# Patient Record
Sex: MALE | URBAN - METROPOLITAN AREA
[De-identification: names, ages, dates, MRNs, and addresses within clinical notes are randomized per-mention and may not be internally consistent; named-entity substitution may affect disease eponyms.]

---

## 2018-08-15 ENCOUNTER — IMPORTED ENCOUNTER (OUTPATIENT)
Dept: URBAN - METROPOLITAN AREA CLINIC 1 | Facility: CLINIC | Age: 73
End: 2018-08-15

## 2023-07-09 ENCOUNTER — HOSPITAL ENCOUNTER (OUTPATIENT)
Age: 78
Setting detail: OBSERVATION
Discharge: HOME | End: 2023-07-10
Attending: FAMILY MEDICINE | Admitting: INTERNAL MEDICINE
Payer: OTHER GOVERNMENT

## 2023-07-09 ENCOUNTER — APPOINTMENT (OUTPATIENT)
Age: 78
End: 2023-07-09
Payer: OTHER GOVERNMENT

## 2023-07-09 DIAGNOSIS — R55 SYNCOPE AND COLLAPSE: Primary | ICD-10-CM

## 2023-07-09 PROBLEM — N17.9 ACUTE KIDNEY INJURY (HCC): Status: ACTIVE | Noted: 2023-07-09

## 2023-07-09 LAB
ALBUMIN SERPL-MCNC: 3.5 G/DL (ref 3.4–5)
ALBUMIN/GLOB SERPL: 0.7 (ref 0.8–1.7)
ALP SERPL-CCNC: 89 U/L (ref 45–117)
ALT SERPL-CCNC: 21 U/L (ref 16–61)
ANION GAP SERPL CALC-SCNC: 10 MMOL/L (ref 3–18)
APPEARANCE UR: CLEAR
AST SERPL W P-5'-P-CCNC: 33 U/L (ref 10–38)
BASOPHILS # BLD: 0 K/UL (ref 0–0.1)
BASOPHILS NFR BLD: 0 % (ref 0–2)
BILIRUB SERPL-MCNC: 0.3 MG/DL (ref 0.2–1)
BILIRUB UR QL: NEGATIVE
BNP SERPL-MCNC: 119 PG/ML (ref 0–1800)
BUN SERPL-MCNC: 17 MG/DL (ref 7–18)
BUN/CREAT SERPL: 11 (ref 12–20)
CA-I BLD-MCNC: 8.9 MG/DL (ref 8.5–10.1)
CHLORIDE SERPL-SCNC: 105 MMOL/L (ref 100–111)
CO2 SERPL-SCNC: 20 MMOL/L (ref 21–32)
COLOR UR: YELLOW
CREAT SERPL-MCNC: 1.61 MG/DL (ref 0.6–1.3)
DIFFERENTIAL METHOD BLD: ABNORMAL
EOSINOPHIL # BLD: 0.4 K/UL (ref 0–0.4)
EOSINOPHIL NFR BLD: 3 % (ref 0–5)
ERYTHROCYTE [DISTWIDTH] IN BLOOD BY AUTOMATED COUNT: 23.2 % (ref 11.6–14.5)
GLOBULIN SER CALC-MCNC: 5.1 G/DL (ref 2–4)
GLUCOSE BLD STRIP.AUTO-MCNC: 193 MG/DL (ref 70–110)
GLUCOSE SERPL-MCNC: 191 MG/DL (ref 74–99)
GLUCOSE UR STRIP.AUTO-MCNC: >1000 MG/DL
HCT VFR BLD AUTO: 35.2 % (ref 36–48)
HGB BLD-MCNC: 10.3 G/DL (ref 13–16)
HGB UR QL STRIP: NEGATIVE
IMM GRANULOCYTES # BLD AUTO: 0 K/UL
IMM GRANULOCYTES NFR BLD AUTO: 0 %
KETONES UR QL STRIP.AUTO: NEGATIVE MG/DL
LEUKOCYTE ESTERASE UR QL STRIP.AUTO: NEGATIVE
LIPASE SERPL-CCNC: 96 U/L (ref 73–393)
LYMPHOCYTES # BLD: 1.3 K/UL (ref 0.9–3.6)
LYMPHOCYTES NFR BLD: 11 % (ref 21–52)
MAGNESIUM SERPL-MCNC: 2.3 MG/DL (ref 1.6–2.6)
MCH RBC QN AUTO: 25.1 PG (ref 24–34)
MCHC RBC AUTO-ENTMCNC: 29.3 G/DL (ref 31–37)
MCV RBC AUTO: 85.6 FL (ref 78–100)
MONOCYTES # BLD: 1.1 K/UL (ref 0.05–1.2)
MONOCYTES NFR BLD: 9 % (ref 3–10)
NEUTS SEG # BLD: 9.4 K/UL (ref 1.8–8)
NEUTS SEG NFR BLD: 77 % (ref 40–73)
NITRITE UR QL STRIP.AUTO: NEGATIVE
NRBC # BLD: 0 K/UL (ref 0–0.01)
NRBC BLD-RTO: 0 PER 100 WBC
PERFORMED BY:: ABNORMAL
PH UR STRIP: 5 (ref 5–8)
PLATELET # BLD AUTO: 344 K/UL (ref 135–420)
PMV BLD AUTO: 10.1 FL (ref 9.2–11.8)
POTASSIUM SERPL-SCNC: 4.1 MMOL/L (ref 3.5–5.5)
PROT SERPL-MCNC: 8.6 G/DL (ref 6.4–8.2)
PROT UR STRIP-MCNC: NEGATIVE MG/DL
RBC # BLD AUTO: 4.11 M/UL (ref 4.35–5.65)
RBC MORPH BLD: ABNORMAL
RBC MORPH BLD: ABNORMAL
SODIUM SERPL-SCNC: 135 MMOL/L (ref 136–145)
SP GR UR REFRACTOMETRY: 1.03 (ref 1–1.03)
TROPONIN I SERPL HS-MCNC: 5 NG/L (ref 0–78)
TROPONIN I SERPL HS-MCNC: 7 NG/L (ref 0–78)
UROBILINOGEN UR QL STRIP.AUTO: 0.2 EU/DL (ref 0.2–1)
WBC # BLD AUTO: 12.2 K/UL (ref 4.6–13.2)

## 2023-07-09 PROCEDURE — 71045 X-RAY EXAM CHEST 1 VIEW: CPT

## 2023-07-09 PROCEDURE — 82962 GLUCOSE BLOOD TEST: CPT

## 2023-07-09 PROCEDURE — 83690 ASSAY OF LIPASE: CPT

## 2023-07-09 PROCEDURE — G0378 HOSPITAL OBSERVATION PER HR: HCPCS

## 2023-07-09 PROCEDURE — 85025 COMPLETE CBC W/AUTO DIFF WBC: CPT

## 2023-07-09 PROCEDURE — 81003 URINALYSIS AUTO W/O SCOPE: CPT

## 2023-07-09 PROCEDURE — 80053 COMPREHEN METABOLIC PANEL: CPT

## 2023-07-09 PROCEDURE — 83735 ASSAY OF MAGNESIUM: CPT

## 2023-07-09 PROCEDURE — 70450 CT HEAD/BRAIN W/O DYE: CPT

## 2023-07-09 PROCEDURE — 99285 EMERGENCY DEPT VISIT HI MDM: CPT

## 2023-07-09 PROCEDURE — 93005 ELECTROCARDIOGRAM TRACING: CPT | Performed by: FAMILY MEDICINE

## 2023-07-09 PROCEDURE — 36415 COLL VENOUS BLD VENIPUNCTURE: CPT

## 2023-07-09 PROCEDURE — 84484 ASSAY OF TROPONIN QUANT: CPT

## 2023-07-09 PROCEDURE — 83880 ASSAY OF NATRIURETIC PEPTIDE: CPT

## 2023-07-09 RX ORDER — SODIUM CHLORIDE 0.9 % (FLUSH) 0.9 %
5-40 SYRINGE (ML) INJECTION PRN
Status: DISCONTINUED | OUTPATIENT
Start: 2023-07-09 | End: 2023-07-10 | Stop reason: HOSPADM

## 2023-07-09 RX ORDER — ROSUVASTATIN CALCIUM 10 MG/1
10 TABLET, COATED ORAL DAILY
COMMUNITY

## 2023-07-09 RX ORDER — ONDANSETRON 2 MG/ML
4 INJECTION INTRAMUSCULAR; INTRAVENOUS EVERY 6 HOURS PRN
Status: DISCONTINUED | OUTPATIENT
Start: 2023-07-09 | End: 2023-07-10 | Stop reason: HOSPADM

## 2023-07-09 RX ORDER — ACETAMINOPHEN 325 MG/1
650 TABLET ORAL EVERY 6 HOURS PRN
Status: DISCONTINUED | OUTPATIENT
Start: 2023-07-09 | End: 2023-07-10 | Stop reason: HOSPADM

## 2023-07-09 RX ORDER — ONDANSETRON 4 MG/1
4 TABLET, ORALLY DISINTEGRATING ORAL EVERY 8 HOURS PRN
Status: DISCONTINUED | OUTPATIENT
Start: 2023-07-09 | End: 2023-07-10 | Stop reason: HOSPADM

## 2023-07-09 RX ORDER — DILTIAZEM HYDROCHLORIDE 180 MG/1
180 CAPSULE, EXTENDED RELEASE ORAL DAILY
Status: DISCONTINUED | OUTPATIENT
Start: 2023-07-10 | End: 2023-07-10

## 2023-07-09 RX ORDER — INSULIN LISPRO 100 [IU]/ML
0-8 INJECTION, SOLUTION INTRAVENOUS; SUBCUTANEOUS
Status: DISCONTINUED | OUTPATIENT
Start: 2023-07-10 | End: 2023-07-10 | Stop reason: HOSPADM

## 2023-07-09 RX ORDER — ALLOPURINOL 100 MG/1
100 TABLET ORAL DAILY
COMMUNITY

## 2023-07-09 RX ORDER — PANTOPRAZOLE SODIUM 40 MG/1
40 TABLET, DELAYED RELEASE ORAL
Status: DISCONTINUED | OUTPATIENT
Start: 2023-07-10 | End: 2023-07-10 | Stop reason: HOSPADM

## 2023-07-09 RX ORDER — INSULIN LISPRO 100 [IU]/ML
0-4 INJECTION, SOLUTION INTRAVENOUS; SUBCUTANEOUS NIGHTLY
Status: DISCONTINUED | OUTPATIENT
Start: 2023-07-10 | End: 2023-07-10 | Stop reason: HOSPADM

## 2023-07-09 RX ORDER — DILTIAZEM HYDROCHLORIDE EXTENDED-RELEASE TABLETS 180 MG/1
180 TABLET, EXTENDED RELEASE ORAL DAILY
COMMUNITY

## 2023-07-09 RX ORDER — SODIUM CHLORIDE 9 MG/ML
INJECTION, SOLUTION INTRAVENOUS CONTINUOUS
Status: DISCONTINUED | OUTPATIENT
Start: 2023-07-09 | End: 2023-07-10 | Stop reason: HOSPADM

## 2023-07-09 RX ORDER — METFORMIN HYDROCHLORIDE 500 MG/1
500 TABLET, EXTENDED RELEASE ORAL
COMMUNITY

## 2023-07-09 RX ORDER — SODIUM CHLORIDE 0.9 % (FLUSH) 0.9 %
5-40 SYRINGE (ML) INJECTION EVERY 12 HOURS SCHEDULED
Status: DISCONTINUED | OUTPATIENT
Start: 2023-07-09 | End: 2023-07-10 | Stop reason: HOSPADM

## 2023-07-09 RX ORDER — ROSUVASTATIN CALCIUM 10 MG/1
10 TABLET, COATED ORAL NIGHTLY
Status: DISCONTINUED | OUTPATIENT
Start: 2023-07-10 | End: 2023-07-10 | Stop reason: HOSPADM

## 2023-07-09 RX ORDER — DEXTROSE MONOHYDRATE 100 MG/ML
INJECTION, SOLUTION INTRAVENOUS CONTINUOUS PRN
Status: DISCONTINUED | OUTPATIENT
Start: 2023-07-09 | End: 2023-07-10 | Stop reason: HOSPADM

## 2023-07-09 RX ORDER — POLYETHYLENE GLYCOL 3350 17 G/17G
17 POWDER, FOR SOLUTION ORAL DAILY PRN
Status: DISCONTINUED | OUTPATIENT
Start: 2023-07-09 | End: 2023-07-10 | Stop reason: HOSPADM

## 2023-07-09 RX ORDER — ACETAMINOPHEN 650 MG/1
650 SUPPOSITORY RECTAL EVERY 6 HOURS PRN
Status: DISCONTINUED | OUTPATIENT
Start: 2023-07-09 | End: 2023-07-10 | Stop reason: HOSPADM

## 2023-07-09 RX ORDER — HYDRALAZINE HYDROCHLORIDE 20 MG/ML
10 INJECTION INTRAMUSCULAR; INTRAVENOUS EVERY 6 HOURS PRN
Status: DISCONTINUED | OUTPATIENT
Start: 2023-07-09 | End: 2023-07-10 | Stop reason: HOSPADM

## 2023-07-09 RX ORDER — OMEPRAZOLE 20 MG/1
20 CAPSULE, DELAYED RELEASE ORAL DAILY
COMMUNITY

## 2023-07-09 ASSESSMENT — ENCOUNTER SYMPTOMS
RESPIRATORY NEGATIVE: 1
NAUSEA: 1
EYES NEGATIVE: 1
ABDOMINAL PAIN: 0
VOMITING: 1

## 2023-07-09 ASSESSMENT — PAIN - FUNCTIONAL ASSESSMENT
PAIN_FUNCTIONAL_ASSESSMENT: NONE - DENIES PAIN

## 2023-07-09 ASSESSMENT — LIFESTYLE VARIABLES
HOW OFTEN DO YOU HAVE A DRINK CONTAINING ALCOHOL: NEVER
HOW MANY STANDARD DRINKS CONTAINING ALCOHOL DO YOU HAVE ON A TYPICAL DAY: PATIENT DOES NOT DRINK

## 2023-07-10 ENCOUNTER — APPOINTMENT (OUTPATIENT)
Age: 78
End: 2023-07-10
Payer: OTHER GOVERNMENT

## 2023-07-10 VITALS
HEIGHT: 68 IN | DIASTOLIC BLOOD PRESSURE: 62 MMHG | BODY MASS INDEX: 30.92 KG/M2 | TEMPERATURE: 98.3 F | HEART RATE: 74 BPM | RESPIRATION RATE: 16 BRPM | WEIGHT: 204 LBS | OXYGEN SATURATION: 100 % | SYSTOLIC BLOOD PRESSURE: 134 MMHG

## 2023-07-10 LAB
ANION GAP SERPL CALC-SCNC: 10 MMOL/L (ref 3–18)
ANION GAP SERPL CALC-SCNC: 8 MMOL/L (ref 3–18)
BUN SERPL-MCNC: 16 MG/DL (ref 7–18)
BUN SERPL-MCNC: 16 MG/DL (ref 7–18)
BUN/CREAT SERPL: 12 (ref 12–20)
BUN/CREAT SERPL: 13 (ref 12–20)
CA-I BLD-MCNC: 8.6 MG/DL (ref 8.5–10.1)
CA-I BLD-MCNC: 8.9 MG/DL (ref 8.5–10.1)
CHLORIDE SERPL-SCNC: 103 MMOL/L (ref 100–111)
CHLORIDE SERPL-SCNC: 107 MMOL/L (ref 100–111)
CO2 SERPL-SCNC: 23 MMOL/L (ref 21–32)
CO2 SERPL-SCNC: 25 MMOL/L (ref 21–32)
CREAT SERPL-MCNC: 1.23 MG/DL (ref 0.6–1.3)
CREAT SERPL-MCNC: 1.38 MG/DL (ref 0.6–1.3)
ECHO AO ASC DIAM: 4.3 CM
ECHO AO ASCENDING AORTA INDEX: 2.09 CM/M2
ECHO AO ROOT DIAM: 3.7 CM
ECHO AO ROOT INDEX: 1.8 CM/M2
ECHO AR MAX VEL PISA: 3.4 M/S
ECHO AV AREA PEAK VELOCITY: 2.6 CM2
ECHO AV AREA VTI: 2.4 CM2
ECHO AV AREA/BSA PEAK VELOCITY: 1.3 CM2/M2
ECHO AV AREA/BSA VTI: 1.2 CM2/M2
ECHO AV MEAN GRADIENT: 8 MMHG
ECHO AV MEAN VELOCITY: 1.2 M/S
ECHO AV PEAK GRADIENT: 15 MMHG
ECHO AV PEAK VELOCITY: 1.9 M/S
ECHO AV REGURGITANT PHT: 554 MS
ECHO AV VELOCITY RATIO: 0.84
ECHO AV VTI: 40.7 CM
ECHO BSA: 2.11 M2
ECHO EST RA PRESSURE: 3 MMHG
ECHO LA AREA 2C: 23.8 CM2
ECHO LA AREA 4C: 22.7 CM2
ECHO LA DIAMETER INDEX: 2.04 CM/M2
ECHO LA DIAMETER: 4.2 CM
ECHO LA MAJOR AXIS: 6.6 CM
ECHO LA MINOR AXIS: 6.3 CM
ECHO LA TO AORTIC ROOT RATIO: 1.14
ECHO LA VOL 2C: 77 ML (ref 18–58)
ECHO LA VOL 4C: 66 ML (ref 18–58)
ECHO LA VOL BP: 73 ML (ref 18–58)
ECHO LA VOL/BSA BIPLANE: 35 ML/M2 (ref 16–34)
ECHO LA VOLUME INDEX A2C: 37 ML/M2 (ref 16–34)
ECHO LA VOLUME INDEX A4C: 32 ML/M2 (ref 16–34)
ECHO LV E' LATERAL VELOCITY: 10 CM/S
ECHO LV E' SEPTAL VELOCITY: 10 CM/S
ECHO LV EDV A2C: 31 ML
ECHO LV EDV A4C: 65 ML
ECHO LV EDV INDEX A4C: 32 ML/M2
ECHO LV EDV NDEX A2C: 15 ML/M2
ECHO LV EJECTION FRACTION A2C: 64 %
ECHO LV EJECTION FRACTION A4C: 67 %
ECHO LV ESV A2C: 11 ML
ECHO LV ESV A4C: 22 ML
ECHO LV ESV INDEX A2C: 5 ML/M2
ECHO LV ESV INDEX A4C: 11 ML/M2
ECHO LV FRACTIONAL SHORTENING: 36 % (ref 28–44)
ECHO LV INTERNAL DIMENSION DIASTOLE INDEX: 2.14 CM/M2
ECHO LV INTERNAL DIMENSION DIASTOLIC: 4.4 CM (ref 4.2–5.9)
ECHO LV INTERNAL DIMENSION SYSTOLIC INDEX: 1.36 CM/M2
ECHO LV INTERNAL DIMENSION SYSTOLIC: 2.8 CM
ECHO LV IVSD: 1.3 CM (ref 0.6–1)
ECHO LV MASS 2D: 203 G (ref 88–224)
ECHO LV MASS INDEX 2D: 98.6 G/M2 (ref 49–115)
ECHO LV POSTERIOR WALL DIASTOLIC: 1.2 CM (ref 0.6–1)
ECHO LV RELATIVE WALL THICKNESS RATIO: 0.55
ECHO LVOT AREA: 3.1 CM2
ECHO LVOT AV VTI INDEX: 0.76
ECHO LVOT DIAM: 2 CM
ECHO LVOT MEAN GRADIENT: 4 MMHG
ECHO LVOT PEAK GRADIENT: 10 MMHG
ECHO LVOT PEAK VELOCITY: 1.6 M/S
ECHO LVOT STROKE VOLUME INDEX: 47.3 ML/M2
ECHO LVOT SV: 97.3 ML
ECHO LVOT VTI: 31 CM
ECHO MV A VELOCITY: 1.21 M/S
ECHO MV AREA VTI: 3.2 CM2
ECHO MV E DECELERATION TIME (DT): 292 MS
ECHO MV E VELOCITY: 1 M/S
ECHO MV E/A RATIO: 0.83
ECHO MV E/E' LATERAL: 10
ECHO MV E/E' RATIO (AVERAGED): 10
ECHO MV E/E' SEPTAL: 10
ECHO MV LVOT VTI INDEX: 1
ECHO MV MAX VELOCITY: 1.3 M/S
ECHO MV MEAN GRADIENT: 3 MMHG
ECHO MV MEAN VELOCITY: 0.8 M/S
ECHO MV PEAK GRADIENT: 7 MMHG
ECHO MV VTI: 30.9 CM
ECHO PV MAX VELOCITY: 1.1 M/S
ECHO PV PEAK GRADIENT: 5 MMHG
ECHO RA AREA 4C: 13.7 CM2
ECHO RA END SYSTOLIC VOLUME APICAL 4 CHAMBER INDEX BSA: 15 ML/M2
ECHO RA VOLUME: 30 ML
ECHO RIGHT VENTRICULAR SYSTOLIC PRESSURE (RVSP): 41 MMHG
ECHO RV BASAL DIMENSION: 3.8 CM
ECHO RV LONGITUDINAL DIMENSION: 6.4 CM
ECHO RV MID DIMENSION: 2.5 CM
ECHO RV TAPSE: 1.9 CM (ref 1.7–?)
ECHO TV REGURGITANT MAX VELOCITY: 3.07 M/S
ECHO TV REGURGITANT PEAK GRADIENT: 38 MMHG
EKG ATRIAL RATE: 298 BPM
EKG DIAGNOSIS: NORMAL
EKG P AXIS: 43 DEGREES
EKG Q-T INTERVAL: 430 MS
EKG QRS DURATION: 74 MS
EKG QTC CALCULATION (BAZETT): 454 MS
EKG R AXIS: 35 DEGREES
EKG T AXIS: 77 DEGREES
EKG VENTRICULAR RATE: 67 BPM
ERYTHROCYTE [DISTWIDTH] IN BLOOD BY AUTOMATED COUNT: 22.9 % (ref 11.6–14.5)
GLUCOSE BLD STRIP.AUTO-MCNC: 134 MG/DL (ref 70–110)
GLUCOSE BLD STRIP.AUTO-MCNC: 137 MG/DL (ref 70–110)
GLUCOSE BLD STRIP.AUTO-MCNC: 155 MG/DL (ref 70–110)
GLUCOSE SERPL-MCNC: 125 MG/DL (ref 74–99)
GLUCOSE SERPL-MCNC: 175 MG/DL (ref 74–99)
HCT VFR BLD AUTO: 28.4 % (ref 36–48)
HGB BLD-MCNC: 8.5 G/DL (ref 13–16)
MAGNESIUM SERPL-MCNC: 2.2 MG/DL (ref 1.6–2.6)
MCH RBC QN AUTO: 25.4 PG (ref 24–34)
MCHC RBC AUTO-ENTMCNC: 29.9 G/DL (ref 31–37)
MCV RBC AUTO: 84.8 FL (ref 78–100)
NRBC # BLD: 0 K/UL (ref 0–0.01)
NRBC BLD-RTO: 0 PER 100 WBC
PERFORMED BY:: ABNORMAL
PLATELET # BLD AUTO: 313 K/UL (ref 135–420)
PMV BLD AUTO: 10.4 FL (ref 9.2–11.8)
POTASSIUM SERPL-SCNC: 3.5 MMOL/L (ref 3.5–5.5)
POTASSIUM SERPL-SCNC: 3.7 MMOL/L (ref 3.5–5.5)
RBC # BLD AUTO: 3.35 M/UL (ref 4.35–5.65)
SODIUM SERPL-SCNC: 136 MMOL/L (ref 136–145)
SODIUM SERPL-SCNC: 140 MMOL/L (ref 136–145)
WBC # BLD AUTO: 7.7 K/UL (ref 4.6–13.2)

## 2023-07-10 PROCEDURE — 6370000000 HC RX 637 (ALT 250 FOR IP): Performed by: NURSE PRACTITIONER

## 2023-07-10 PROCEDURE — 83735 ASSAY OF MAGNESIUM: CPT

## 2023-07-10 PROCEDURE — 6370000000 HC RX 637 (ALT 250 FOR IP): Performed by: INTERNAL MEDICINE

## 2023-07-10 PROCEDURE — 2580000003 HC RX 258: Performed by: NURSE PRACTITIONER

## 2023-07-10 PROCEDURE — G0378 HOSPITAL OBSERVATION PER HR: HCPCS

## 2023-07-10 PROCEDURE — 80048 BASIC METABOLIC PNL TOTAL CA: CPT

## 2023-07-10 PROCEDURE — 82962 GLUCOSE BLOOD TEST: CPT

## 2023-07-10 PROCEDURE — 93306 TTE W/DOPPLER COMPLETE: CPT

## 2023-07-10 PROCEDURE — 85027 COMPLETE CBC AUTOMATED: CPT

## 2023-07-10 RX ORDER — HYDROCHLOROTHIAZIDE 25 MG/1
25 TABLET ORAL DAILY
Status: DISCONTINUED | OUTPATIENT
Start: 2023-07-10 | End: 2023-07-10 | Stop reason: HOSPADM

## 2023-07-10 RX ORDER — HYDROCHLOROTHIAZIDE 25 MG/1
25 TABLET ORAL DAILY
COMMUNITY

## 2023-07-10 RX ORDER — FERROUS SULFATE 325(65) MG
325 TABLET ORAL
COMMUNITY

## 2023-07-10 RX ORDER — FERROUS SULFATE 325(65) MG
325 TABLET ORAL
Status: DISCONTINUED | OUTPATIENT
Start: 2023-07-10 | End: 2023-07-10 | Stop reason: HOSPADM

## 2023-07-10 RX ORDER — DILTIAZEM HYDROCHLORIDE 180 MG/1
180 CAPSULE, COATED, EXTENDED RELEASE ORAL DAILY
Status: DISCONTINUED | OUTPATIENT
Start: 2023-07-10 | End: 2023-07-10 | Stop reason: HOSPADM

## 2023-07-10 RX ADMIN — APIXABAN 2.5 MG: 2.5 TABLET, FILM COATED ORAL at 09:08

## 2023-07-10 RX ADMIN — DILTIAZEM HYDROCHLORIDE 180 MG: 180 CAPSULE, EXTENDED RELEASE ORAL at 09:07

## 2023-07-10 RX ADMIN — APIXABAN 2.5 MG: 2.5 TABLET, FILM COATED ORAL at 01:21

## 2023-07-10 RX ADMIN — SODIUM CHLORIDE: 9 INJECTION, SOLUTION INTRAVENOUS at 01:22

## 2023-07-10 RX ADMIN — SODIUM CHLORIDE, PRESERVATIVE FREE 10 ML: 5 INJECTION INTRAVENOUS at 09:08

## 2023-07-10 RX ADMIN — FERROUS SULFATE TAB 325 MG (65 MG ELEMENTAL FE) 325 MG: 325 (65 FE) TAB at 09:07

## 2023-07-10 RX ADMIN — SODIUM CHLORIDE, PRESERVATIVE FREE 10 ML: 5 INJECTION INTRAVENOUS at 01:21

## 2023-07-10 RX ADMIN — PANTOPRAZOLE SODIUM 40 MG: 40 TABLET, DELAYED RELEASE ORAL at 06:31

## 2023-07-10 NOTE — CONSULTS
CARDIOLOGY CONSULTATION    REASON FOR CONSULT: syncope w/ h/o afib    REQUESTING PROVIDER: Mauri Spence MD    CHIEF COMPLAINT:  \"passed out at [employer]\"    HISTORY OF PRESENT ILLNESS:  Bertha Lujan is a 69y.o. year-old male with past medical history significant for afib (on eliquis), dm, htn, and asbestosis who was evaluated today due to syncope. He reports he was playing the keyboard at [employer] when he felt himself getting sweaty and weaker then reportedly passed out. He vomited as well. As he started to come around, his sister, who is a nurse, gave him some sips of sugary drink and he felt better. He reports not having ate that morning. He was able to walk to the ambulance but brought to the ED for evaluation. In the ED, he was feeling better. Labs showed gluc 193, sodium 135,  potassium 4.1, creat 1.6, mag 2.3, troponin 7, proBNP 119 , Hgb 10.3. EKG showed atrial flutter with variable AV block, nonspecific ST/T wave abnormality, HR 67. CXR/head CT normal    Records from hospital admission course thus far reviewed. Telemetry reviewed. No events overnight. Sinus rhythm . INPATIENT MEDICATIONS:  Home medications reviewed.     Current Facility-Administered Medications:     [Held by provider] hydroCHLOROthiazide (HYDRODIURIL) tablet 25 mg, 25 mg, Oral, Daily, ROMULO Jara CNP    ferrous sulfate (IRON 325) tablet 325 mg, 325 mg, Oral, Daily with breakfast, ROMULO Moore CNP, 325 mg at 07/10/23 0907    dilTIAZem (CARDIZEM CD) extended release capsule 180 mg, 180 mg, Oral, Daily, Mauri Spence MD, 180 mg at 07/10/23 1554    sodium chloride flush 0.9 % injection 5-40 mL, 5-40 mL, IntraVENous, 2 times per day, ROMULO Moore CNP, 10 mL at 07/10/23 0908    sodium chloride flush 0.9 % injection 5-40 mL, 5-40 mL, IntraVENous, PRN, ROMULO Jara CNP    ondansetron (ZOFRAN-ODT) disintegrating tablet 4 mg, 4 mg, Oral, Q8H PRN **OR** ondansetron (ZOFRAN)

## 2023-07-10 NOTE — PLAN OF CARE
Problem: Safety - Adult  Goal: Free from fall injury  Outcome: Progressing  Flowsheets (Taken 7/10/2023 0100)  Free From Fall Injury: Instruct family/caregiver on patient safety

## 2023-07-10 NOTE — ED NOTES
TRANSFER - OUT REPORT:    Verbal report given to [place] on Stephen Flank  being transferred to [facility] for routine progression of patient care       Report consisted of patient's Situation, Background, Assessment and   Recommendations(SBAR). Information from the following report(s) Nurse Handoff Report, ED Encounter Summary, ED SBAR, and Cardiac Rhythm A Flutter  was reviewed with the receiving nurse. [place] Fall Assessment:    Presents to emergency department  because of falls (Syncope, seizure, or loss of consciousness): No  Age > 79: Yes     Impaired Mobility: Ambulates or transfers with assistive devices or assistance; Unable to ambulate or transer.: No  Nursing Judgement: No          Lines:   Peripheral IV 07/09/23 Posterior; Left Hand (Active)        Opportunity for questions and clarification was provided.       Patient transported with:  Monitor and [address] Rosie Muskego, RN  07/09/23 2706

## 2023-07-10 NOTE — ASSESSMENT & PLAN NOTE
-chronic, above goal  -holding HCTZ due to elevated Cr, Prn Hydralazine via IV for systolic greater than 016  -monitor closely

## 2023-07-10 NOTE — ASSESSMENT & PLAN NOTE
-chronic.  Holding Jardiance and Metformin  -implement sliding scale, POC glucose prior to meals and bedtime  -diabetic diet

## 2023-07-10 NOTE — H&P
History and Physical    Subjective:     Stu Prieto is a 69 y.o. -American male with a past medical history of atrial fibrillation, Diabetes mellitus type 2, Hypertension, iron deficiency anemia, gout, and Prostate cancer, presented to the the ED with a chief complaint of syncope and collapse. Patient reported that he was at [employer] sitting playing the piano, family at the bedside noticed he became diaphoretic, had labored breathing, episode of vomiting, and eventually passed out. Patient does not recall the event, he does complain of frequent palpitations, generalized weakness, shortness of breath, and dizziness upon standing. Patient endorses 2 additional syncope episode over the last 2 weeks, which he left a message at his PCP [facility]), he denies chest pain, headache, nausea, and abdominal pain. In the ED at now 1.61, glucose 191, H&H 10.3/35.2, UA unremarkable, CT of head no acute process found, chest x-ray no acute process found, and EKG showing atrial flutter rate controlled. Discussed case with ED provider, hospital medicine will admit the patient for further evaluation and treatment. Patient assessed in the ED, at the bedside, patient is alert and oriented, there is no acute distress noted. Patient agrees to admission for a diagnosis of syncope and collapse, treatment to include cardiac monitoring, cardiology consult, echo, and IV hydration. Admit to medical telemetry for observation. Past Medical History:   Diagnosis Date    A-fib ([address] )     Asbestosis ([facility])     Diabetes mellitus ([facility])     Hypertension       Past Surgical History:   Procedure Laterality Date    APPENDECTOMY       No family history on file. Social History     Tobacco Use    Smoking status: Never    Smokeless tobacco: Never   Substance Use Topics    Alcohol use: Never       Prior to Admission medications    Medication Sig Start Date End Date Taking?  Authorizing Provider   apixaban (ELIQUIS) 2.5 MG TABS tablet

## 2023-07-10 NOTE — ASSESSMENT & PLAN NOTE
-Gentle IV hydration  -EKG: Atrial flutter, rate controlled  -cardiac monitoring  -CT head: no acute findings  -check orthostatic vitals  -repeat CBC and BMP in the am  -monitor intake and output, monitor closely for fluid overload  -ECHO in the am  -cardiologist consult due to the syncope in the presence of a history of atrial fibrillation and complaints of palpitations

## 2023-07-10 NOTE — ASSESSMENT & PLAN NOTE
Likely secondary to dehydration  -Cr 1.61, no labs to compare  -start  ml/hr  -strict I/O  -Avoid nephrotoxic agents. No NSAIDS, No ACEI/ARBs.  No Diuretics  -avoid lowering BP drastically  -monitor for fluid overload  -repeat BMP in the am

## 2023-07-10 NOTE — DISCHARGE SUMMARY
Hematocrit 28.4 (L) 36.0 - 48.0 %    MCV 84.8 78.0 - 100.0 FL    MCH 25.4 24.0 - 34.0 PG    MCHC 29.9 (L) 31.0 - 37.0 g/dL    RDW 22.9 (H) 11.6 - 14.5 %    Platelets 597 746 - 285 K/uL    MPV 10.4 9.2 - 11.8 FL    Nucleated RBCs 0.0 0.0  WBC    nRBC 0.00 0.00 - 0.01 K/uL   Basic Metabolic Panel    Collection Time: 07/10/23  4:03 AM   Result Value Ref Range    Sodium 136 136 - 145 mmol/L    Potassium 3.5 3.5 - 5.5 mmol/L    Chloride 103 100 - 111 mmol/L    CO2 23 21 - 32 mmol/L    Anion Gap 10 3.0 - 18.0 mmol/L    Glucose 175 (H) 74 - 99 mg/dL    BUN 16 7 - 18 mg/dL    Creatinine 1.38 (H) 0.60 - 1.30 mg/dL    Bun/Cre Ratio 12 12 - 20      Est, Glom Filt Rate 53 (L) >60 ml/min/1.73m2    Calcium 8.9 8.5 - 10.1 mg/dL   Magnesium    Collection Time: 07/10/23  4:03 AM   Result Value Ref Range    Magnesium 2.2 1.6 - 2.6 mg/dL   POCT Glucose    Collection Time: 07/10/23  7:13 AM   Result Value Ref Range    POC Glucose 137 (H) 70 - 110 mg/dL    Performed by:  [employer]      Recent Labs     07/10/23  0403   GLUCOSE 175*   BUN 16   CALCIUM 8.9      CO2 23     Recent Labs     07/10/23  0403   WBC 7.7   RBC 3.35*   HCT 28.4*   MCV 84.8   MCH 25.4   MCHC 29.9*   RDW 22.9*         Last 3 CBC:   Recent Labs     07/09/23  1949 07/10/23  0403   WBC 12.2 7.7   RBC 4.11* 3.35*   HGB 10.3* 8.5*   HCT 35.2* 28.4*   MCV 85.6 84.8   MCH 25.1 25.4   MCHC 29.3* 29.9*   RDW 23.2* 22.9*    313   MPV 10.1 10.4      Last 3 BMP:   Recent Labs     07/09/23  1949 07/10/23  0403   * 136   K 4.1 3.5    103   CO2 20* 23   BUN 17 16   CREATININE 1.61* 1.38*   GLUCOSE 191* 175*   CALCIUM 8.9 8.9      Last 3 CMP:   Recent Labs     07/09/23  1949 07/10/23  0403   * 136   K 4.1 3.5    103   CO2 20* 23   BUN 17 16   CREATININE 1.61* 1.38*   GLUCOSE 191* 175*   CALCIUM 8.9 8.9   PROT 8.6*  --    LABALBU 3.5  --    ALKPHOS 89  --    AST 33  --    ALT 21  --             Imaging results impression only:  CT Head W/O

## 2023-07-10 NOTE — CARE COORDINATION
Case Management Assessment  Initial Evaluation    Date/Time of Evaluation: 7/10/2023 1:37 PM  Assessment Completed by: Avtar Velásquez RN    If patient is discharged prior to next notation, then this note serves as note for discharge by case management. Patient Name: Stacy Peralta                   YOB: 1945  Diagnosis: Syncope and collapse [R55]                   Date / Time: 7/9/2023  7:33 PM    Patient Admission Status: Observation   Readmission Risk (Low < 19, Mod (19-27), High > 27): No data recorded  Current PCP: None None  PCP verified by CM? Yes (Nidhi Harvey MD)    Chart Reviewed: Yes      History Provided by: Patient  Patient Orientation: Alert and Oriented    Patient Cognition: Alert    Hospitalization in the last 30 days (Readmission):  No    If yes, Readmission Assessment in  Navigator will be completed.     Advance Directives:      Code Status: Full Code   Patient's Primary Decision Maker is: Legal Next of Kin      Discharge Planning:    Patient lives with: (P) Alone Type of Home: (P) House  Primary Care Giver: Self  Patient Support Systems include: Friends/Neighbors, [facility]   Current Financial resources: (P) [place]  Current community resources: (P) None  Current services prior to admission: (P) None            Current DME:              Type of Home Care services:  (P) None    ADLS  Prior functional level: (P) Independent in ADLs/IADLs  Current functional level: (P) Independent in ADLs/IADLs    PT AM-PAC:   /24  OT AM-PAC:   /24    Family can provide assistance at DC: (P) No  Would you like Case Management to discuss the discharge plan with any other family members/significant others, and if so, who? (P) No  Plans to Return to Present Housing: (P) Yes  Other Identified Issues/Barriers to RETURNING to current housing: None  Potential Assistance needed at discharge: (P) N/A            Potential DME:    Patient expects to discharge to: (P) [id number]

## 2023-07-10 NOTE — ED PROVIDER NOTES
[facility] EMERGENCY DEPT  EMERGENCY DEPARTMENT ENCOUNTER      Pt Name: Stacy Peralta  MRN: [id number]  [address] 1945  Date of evaluation: 7/9/2023  Provider: Eduin Zamora DO    CHIEF COMPLAINT       Chief Complaint   Patient presents with    Loss of Consciousness         HISTORY OF PRESENT ILLNESS   (Location/Symptom, Timing/Onset, Context/Setting, Quality, Duration, Modifying Factors, Severity)  Note limiting factors. Stacy Peralta is a 69 y.o. male who presents to the emergency department syncope     Patient presents to the ED via EMS for syncopal episode. Patient was at [employer], playing keyboard when he felt weak, nauseated, and became diaphoretic. He was witnessed to fall out of his chair. He remembers immediately before and immediately after but is unsure if he was truly unconscious or not. Bystander reports patient was responsive immediately after the event. Patient vomited mucous after his syncopal episode. He denies nausea currently and states he feels fine now. He denies injury from the fall. Patient reports history of a-fib, states he was been trying to get in with his [facility] provider to adjust his medication because he will experience episodes of palpitations. He denies fever, chills, chest pain, SOB, abdominal pain, headache, numbness, tingling, vision changes, or speech changes. He reports 2 additional syncopal episodes within the last 2 weeks with similar prodromal symptoms but was not seen after those incidents. The history is provided by the patient, a friend and the EMS personnel. Nursing Notes were reviewed. REVIEW OF SYSTEMS    (2-9 systems for level 4, 10 or more for level 5)     Review of Systems   Constitutional: Negative. HENT: Negative. Eyes: Negative. Respiratory: Negative. Cardiovascular:  Positive for palpitations. Gastrointestinal:  Positive for nausea and vomiting. Negative for abdominal pain. Genitourinary: Negative. Musculoskeletal: Negative.

## 2023-07-10 NOTE — PROGRESS NOTES
0700- Assumed care of patient on walking rounds. Patient resting in bed. No distress noted at this time. Vitals obtained shift assessment complete. Ivf infusing as ordered. Patient denies any needs. Cb in reach     0908- Administered AM medications with no complaints. Patient denies any needs. 1100- patient resting in bed denies any needs at this time. Cb in reach    1330- patient discharged home with brother. All belongings with patient. Iv and tele removed.

## 2023-07-28 ENCOUNTER — HOSPITAL ENCOUNTER (EMERGENCY)
Age: 78
Discharge: HOME | End: 2023-07-28
Attending: INTERNAL MEDICINE
Payer: OTHER GOVERNMENT

## 2023-07-28 ENCOUNTER — APPOINTMENT (OUTPATIENT)
Age: 78
End: 2023-07-28
Payer: OTHER GOVERNMENT

## 2023-07-28 VITALS
BODY MASS INDEX: 28.64 KG/M2 | RESPIRATION RATE: 20 BRPM | HEIGHT: 68 IN | WEIGHT: 189 LBS | SYSTOLIC BLOOD PRESSURE: 133 MMHG | DIASTOLIC BLOOD PRESSURE: 57 MMHG | OXYGEN SATURATION: 99 % | HEART RATE: 71 BPM | TEMPERATURE: 97.9 F

## 2023-07-28 DIAGNOSIS — R42 ORTHOSTATIC DIZZINESS: Primary | ICD-10-CM

## 2023-07-28 LAB
ALBUMIN SERPL-MCNC: 3.1 G/DL (ref 3.4–5)
ALBUMIN/GLOB SERPL: 0.6 (ref 0.8–1.7)
ALP SERPL-CCNC: 84 U/L (ref 45–117)
ALT SERPL-CCNC: 21 U/L (ref 16–61)
ANION GAP SERPL CALC-SCNC: 10 MMOL/L (ref 3–18)
APPEARANCE UR: CLEAR
AST SERPL W P-5'-P-CCNC: 18 U/L (ref 10–38)
BACTERIA URNS QL MICRO: ABNORMAL /HPF
BASOPHILS # BLD: 0.1 K/UL (ref 0–0.1)
BASOPHILS NFR BLD: 1 % (ref 0–2)
BILIRUB SERPL-MCNC: 0.2 MG/DL (ref 0.2–1)
BILIRUB UR QL: NEGATIVE
BUN SERPL-MCNC: 13 MG/DL (ref 7–18)
BUN/CREAT SERPL: 10 (ref 12–20)
CA-I BLD-MCNC: 8.9 MG/DL (ref 8.5–10.1)
CHLORIDE SERPL-SCNC: 102 MMOL/L (ref 100–111)
CO2 SERPL-SCNC: 24 MMOL/L (ref 21–32)
COLOR UR: YELLOW
CREAT SERPL-MCNC: 1.36 MG/DL (ref 0.6–1.3)
DIFFERENTIAL METHOD BLD: ABNORMAL
EKG DIAGNOSIS: NORMAL
EKG Q-T INTERVAL: 360 MS
EKG QRS DURATION: 76 MS
EKG QTC CALCULATION (BAZETT): 410 MS
EKG R AXIS: 35 DEGREES
EKG T AXIS: 78 DEGREES
EKG VENTRICULAR RATE: 78 BPM
EOSINOPHIL # BLD: 0.1 K/UL (ref 0–0.4)
EOSINOPHIL NFR BLD: 2 % (ref 0–5)
EPITH CASTS URNS QL MICRO: ABNORMAL /LPF (ref 0–20)
ERYTHROCYTE [DISTWIDTH] IN BLOOD BY AUTOMATED COUNT: 21.4 % (ref 11.6–14.5)
FLUAV AG NPH QL IA: NEGATIVE
FLUBV AG NOSE QL IA: NEGATIVE
GLOBULIN SER CALC-MCNC: 4.9 G/DL (ref 2–4)
GLUCOSE SERPL-MCNC: 134 MG/DL (ref 74–99)
GLUCOSE UR STRIP.AUTO-MCNC: >1000 MG/DL
HCT VFR BLD AUTO: 29.8 % (ref 36–48)
HGB BLD-MCNC: 9.2 G/DL (ref 13–16)
HGB UR QL STRIP: NEGATIVE
IMM GRANULOCYTES # BLD AUTO: 0.1 K/UL (ref 0–0.04)
IMM GRANULOCYTES NFR BLD AUTO: 1 % (ref 0–0.5)
KETONES UR QL STRIP.AUTO: NEGATIVE MG/DL
LACTATE SERPL-SCNC: 1.4 MMOL/L (ref 0.4–2)
LEUKOCYTE ESTERASE UR QL STRIP.AUTO: NEGATIVE
LIPASE SERPL-CCNC: 86 U/L (ref 73–393)
LYMPHOCYTES # BLD: 1.5 K/UL (ref 0.9–3.6)
LYMPHOCYTES NFR BLD: 19 % (ref 21–52)
MAGNESIUM SERPL-MCNC: 2.5 MG/DL (ref 1.6–2.6)
MCH RBC QN AUTO: 24.9 PG (ref 24–34)
MCHC RBC AUTO-ENTMCNC: 30.9 G/DL (ref 31–37)
MCV RBC AUTO: 80.5 FL (ref 78–100)
MONOCYTES # BLD: 1.4 K/UL (ref 0.05–1.2)
MONOCYTES NFR BLD: 18 % (ref 3–10)
NEUTS SEG # BLD: 4.8 K/UL (ref 1.8–8)
NEUTS SEG NFR BLD: 59 % (ref 40–73)
NITRITE UR QL STRIP.AUTO: NEGATIVE
NRBC # BLD: 0 K/UL (ref 0–0.01)
NRBC BLD-RTO: 0 PER 100 WBC
PH UR STRIP: 7 (ref 5–8)
PLATELET # BLD AUTO: 408 K/UL (ref 135–420)
PMV BLD AUTO: 10.6 FL (ref 9.2–11.8)
POTASSIUM SERPL-SCNC: 3.4 MMOL/L (ref 3.5–5.5)
PROT SERPL-MCNC: 8 G/DL (ref 6.4–8.2)
PROT UR STRIP-MCNC: NEGATIVE MG/DL
RBC # BLD AUTO: 3.7 M/UL (ref 4.35–5.65)
RBC #/AREA URNS HPF: ABNORMAL /HPF (ref 0–2)
SODIUM SERPL-SCNC: 136 MMOL/L (ref 136–145)
SP GR UR REFRACTOMETRY: 1.02 (ref 1–1.03)
TROPONIN I SERPL HS-MCNC: 4 NG/L (ref 0–78)
UROBILINOGEN UR QL STRIP.AUTO: 0.2 EU/DL (ref 0.2–1)
WBC # BLD AUTO: 7.9 K/UL (ref 4.6–13.2)
WBC URNS QL MICRO: ABNORMAL /HPF (ref 0–4)

## 2023-07-28 PROCEDURE — 85025 COMPLETE CBC W/AUTO DIFF WBC: CPT

## 2023-07-28 PROCEDURE — 71045 X-RAY EXAM CHEST 1 VIEW: CPT

## 2023-07-28 PROCEDURE — 93005 ELECTROCARDIOGRAM TRACING: CPT | Performed by: INTERNAL MEDICINE

## 2023-07-28 PROCEDURE — 99285 EMERGENCY DEPT VISIT HI MDM: CPT

## 2023-07-28 PROCEDURE — 36415 COLL VENOUS BLD VENIPUNCTURE: CPT

## 2023-07-28 PROCEDURE — 84484 ASSAY OF TROPONIN QUANT: CPT

## 2023-07-28 PROCEDURE — 2580000003 HC RX 258: Performed by: INTERNAL MEDICINE

## 2023-07-28 PROCEDURE — 83735 ASSAY OF MAGNESIUM: CPT

## 2023-07-28 PROCEDURE — 83690 ASSAY OF LIPASE: CPT

## 2023-07-28 PROCEDURE — 87804 INFLUENZA ASSAY W/OPTIC: CPT

## 2023-07-28 PROCEDURE — 6370000000 HC RX 637 (ALT 250 FOR IP): Performed by: INTERNAL MEDICINE

## 2023-07-28 PROCEDURE — 96360 HYDRATION IV INFUSION INIT: CPT

## 2023-07-28 PROCEDURE — 83605 ASSAY OF LACTIC ACID: CPT

## 2023-07-28 PROCEDURE — 81003 URINALYSIS AUTO W/O SCOPE: CPT

## 2023-07-28 PROCEDURE — 80053 COMPREHEN METABOLIC PANEL: CPT

## 2023-07-28 RX ORDER — POTASSIUM CHLORIDE 750 MG/1
40 TABLET, EXTENDED RELEASE ORAL ONCE
Status: COMPLETED | OUTPATIENT
Start: 2023-07-28 | End: 2023-07-28

## 2023-07-28 RX ORDER — POTASSIUM CHLORIDE 750 MG/1
40 TABLET, EXTENDED RELEASE ORAL 2 TIMES DAILY
Status: DISCONTINUED | OUTPATIENT
Start: 2023-07-28 | End: 2023-07-28

## 2023-07-28 RX ORDER — SODIUM CHLORIDE, SODIUM LACTATE, POTASSIUM CHLORIDE, AND CALCIUM CHLORIDE .6; .31; .03; .02 G/100ML; G/100ML; G/100ML; G/100ML
1000 INJECTION, SOLUTION INTRAVENOUS ONCE
Status: COMPLETED | OUTPATIENT
Start: 2023-07-28 | End: 2023-07-28

## 2023-07-28 RX ADMIN — SODIUM CHLORIDE, POTASSIUM CHLORIDE, SODIUM LACTATE AND CALCIUM CHLORIDE 1000 ML: 600; 310; 30; 20 INJECTION, SOLUTION INTRAVENOUS at 17:04

## 2023-07-28 RX ADMIN — POTASSIUM CHLORIDE 40 MEQ: 750 TABLET, EXTENDED RELEASE ORAL at 18:40

## 2023-07-28 ASSESSMENT — PAIN SCALES - GENERAL
PAINLEVEL_OUTOF10: 0

## 2023-07-28 ASSESSMENT — ENCOUNTER SYMPTOMS
ABDOMINAL PAIN: 0
TROUBLE SWALLOWING: 0
CHOKING: 0
CONSTIPATION: 0
NAUSEA: 0
SORE THROAT: 0
COUGH: 0
DIARRHEA: 0
SHORTNESS OF BREATH: 0

## 2023-07-28 ASSESSMENT — PAIN - FUNCTIONAL ASSESSMENT: PAIN_FUNCTIONAL_ASSESSMENT: 0-10

## 2023-07-28 NOTE — ED NOTES
Orthostatic vital signs :  Supine P 82, /67; Sitting P 77, /62,  Standing P 85, /38. Patient states dizziness when moving from sitting to standing position. MD made aware of orthostatics.       Samira Emerson RN  07/28/23 [facility] TERESA Martinez  07/28/23 0188

## 2023-07-28 NOTE — ED NOTES
Discharge instructions reviewed with patient. Patient verbalized understanding. Patient advised to follow up as directed on discharge instructions. Patient denies questions, needs or concerns at this time. Patient verbalized understanding. No s/sx of distress noted.        David Emerson RN  07/28/23 5058

## 2023-07-28 NOTE — ED PROVIDER NOTES
occasionally words are mis-transcribed.)    Lucia Mullen MD (electronically signed)  Attending Emergency Physician           Lucia Mullen MD  07/28/23 0029

## 2023-07-28 NOTE — ED NOTES
Patient's orthostatic v/s repeated following fluid bolus completion per MD vorbv. Orthostatic v/s as follows:  Supine: P 65, /52; Sitting: P 72, /57;  Standing:  P 83, /62. Patient states improvement in dizziness. States only slight dizziness. MD made aware.        Xiomara Emerson RN  07/28/23 8778

## 2023-09-21 ENCOUNTER — HOSPITAL ENCOUNTER (OUTPATIENT)
Age: 78
Setting detail: OBSERVATION
Discharge: HOME | End: 2023-09-23
Attending: FAMILY MEDICINE | Admitting: INTERNAL MEDICINE
Payer: OTHER GOVERNMENT

## 2023-09-21 ENCOUNTER — APPOINTMENT (OUTPATIENT)
Age: 78
End: 2023-09-21
Payer: OTHER GOVERNMENT

## 2023-09-21 DIAGNOSIS — R19.5 OCCULT BLOOD POSITIVE STOOL: ICD-10-CM

## 2023-09-21 DIAGNOSIS — D64.9 ANEMIA, UNSPECIFIED TYPE: Primary | ICD-10-CM

## 2023-09-21 DIAGNOSIS — R53.83 FATIGUE, UNSPECIFIED TYPE: ICD-10-CM

## 2023-09-21 LAB
ALBUMIN SERPL-MCNC: 2.8 G/DL (ref 3.4–5)
ALBUMIN/GLOB SERPL: 0.6 (ref 0.8–1.7)
ALP SERPL-CCNC: 80 U/L (ref 45–117)
ALT SERPL-CCNC: 25 U/L (ref 16–61)
AMPHET UR QL SCN: NEGATIVE
ANION GAP SERPL CALC-SCNC: 10 MMOL/L (ref 3–18)
AST SERPL W P-5'-P-CCNC: 37 U/L (ref 10–38)
BARBITURATES UR QL SCN: NEGATIVE
BASOPHILS # BLD: 0.1 K/UL (ref 0–0.1)
BASOPHILS NFR BLD: 1 % (ref 0–2)
BENZODIAZ UR QL: NEGATIVE
BILIRUB SERPL-MCNC: 0.1 MG/DL (ref 0.2–1)
BNP SERPL-MCNC: 364 PG/ML (ref 0–1800)
BUN SERPL-MCNC: 12 MG/DL (ref 7–18)
BUN/CREAT SERPL: 10 (ref 12–20)
CA-I BLD-MCNC: 8.5 MG/DL (ref 8.5–10.1)
CANNABINOIDS UR QL SCN: NEGATIVE
CHLORIDE SERPL-SCNC: 102 MMOL/L (ref 100–111)
CO2 SERPL-SCNC: 24 MMOL/L (ref 21–32)
COCAINE UR QL SCN: NEGATIVE
CREAT SERPL-MCNC: 1.2 MG/DL (ref 0.6–1.3)
DIFFERENTIAL METHOD BLD: ABNORMAL
EOSINOPHIL # BLD: 0.2 K/UL (ref 0–0.4)
EOSINOPHIL NFR BLD: 2 % (ref 0–5)
ERYTHROCYTE [DISTWIDTH] IN BLOOD BY AUTOMATED COUNT: 21.1 % (ref 11.6–14.5)
ETHANOL SERPL-MCNC: <3 MG/DL (ref 0–3)
FENTANYL UR QL SCN: NEGATIVE
GLOBULIN SER CALC-MCNC: 4.8 G/DL (ref 2–4)
GLUCOSE SERPL-MCNC: 206 MG/DL (ref 74–99)
HCT VFR BLD AUTO: 22.4 % (ref 36–48)
HGB BLD-MCNC: 6.6 G/DL (ref 13–16)
IMM GRANULOCYTES # BLD AUTO: 0.1 K/UL (ref 0–0.04)
IMM GRANULOCYTES NFR BLD AUTO: 1 % (ref 0–0.5)
LYMPHOCYTES # BLD: 1.1 K/UL (ref 0.9–3.6)
LYMPHOCYTES NFR BLD: 13 % (ref 21–52)
Lab: NORMAL
MAGNESIUM SERPL-MCNC: 2.2 MG/DL (ref 1.6–2.6)
MCH RBC QN AUTO: 22.8 PG (ref 24–34)
MCHC RBC AUTO-ENTMCNC: 29.5 G/DL (ref 31–37)
MCV RBC AUTO: 77.2 FL (ref 78–100)
METHADONE UR QL: NEGATIVE
MONOCYTES # BLD: 0.8 K/UL (ref 0.05–1.2)
MONOCYTES NFR BLD: 9 % (ref 3–10)
NEUTS SEG # BLD: 6.6 K/UL (ref 1.8–8)
NEUTS SEG NFR BLD: 74 % (ref 40–73)
NRBC # BLD: 0.02 K/UL (ref 0–0.01)
NRBC BLD-RTO: 0.2 PER 100 WBC
OPIATES UR QL: NEGATIVE
OXYCODONE UR QL SCN: NEGATIVE
PCP UR QL: NEGATIVE
PLATELET # BLD AUTO: 483 K/UL (ref 135–420)
PMV BLD AUTO: 10.7 FL (ref 9.2–11.8)
POTASSIUM SERPL-SCNC: 3.8 MMOL/L (ref 3.5–5.5)
PROPOXYPH UR QL: NEGATIVE
PROT SERPL-MCNC: 7.6 G/DL (ref 6.4–8.2)
RBC # BLD AUTO: 2.9 M/UL (ref 4.35–5.65)
SODIUM SERPL-SCNC: 136 MMOL/L (ref 136–145)
TRICYCLICS UR QL: NEGATIVE
TROPONIN I SERPL HS-MCNC: 5 NG/L (ref 0–78)
TSH SERPL DL<=0.05 MIU/L-ACNC: 1.86 UIU/ML (ref 0.36–3.74)
WBC # BLD AUTO: 8.8 K/UL (ref 4.6–13.2)

## 2023-09-21 PROCEDURE — 83735 ASSAY OF MAGNESIUM: CPT

## 2023-09-21 PROCEDURE — 81003 URINALYSIS AUTO W/O SCOPE: CPT

## 2023-09-21 PROCEDURE — 80307 DRUG TEST PRSMV CHEM ANLYZR: CPT

## 2023-09-21 PROCEDURE — 83880 ASSAY OF NATRIURETIC PEPTIDE: CPT

## 2023-09-21 PROCEDURE — 36415 COLL VENOUS BLD VENIPUNCTURE: CPT

## 2023-09-21 PROCEDURE — 93005 ELECTROCARDIOGRAM TRACING: CPT | Performed by: FAMILY MEDICINE

## 2023-09-21 PROCEDURE — 82077 ASSAY SPEC XCP UR&BREATH IA: CPT

## 2023-09-21 PROCEDURE — 99285 EMERGENCY DEPT VISIT HI MDM: CPT

## 2023-09-21 PROCEDURE — 71045 X-RAY EXAM CHEST 1 VIEW: CPT

## 2023-09-21 PROCEDURE — 84443 ASSAY THYROID STIM HORMONE: CPT

## 2023-09-21 PROCEDURE — 84484 ASSAY OF TROPONIN QUANT: CPT

## 2023-09-21 PROCEDURE — 80053 COMPREHEN METABOLIC PANEL: CPT

## 2023-09-21 PROCEDURE — 85025 COMPLETE CBC W/AUTO DIFF WBC: CPT

## 2023-09-21 RX ORDER — SODIUM CHLORIDE 9 MG/ML
INJECTION, SOLUTION INTRAVENOUS PRN
Status: DISCONTINUED | OUTPATIENT
Start: 2023-09-21 | End: 2023-09-23 | Stop reason: HOSPADM

## 2023-09-21 ASSESSMENT — PAIN - FUNCTIONAL ASSESSMENT
PAIN_FUNCTIONAL_ASSESSMENT: NONE - DENIES PAIN
PAIN_FUNCTIONAL_ASSESSMENT: NONE - DENIES PAIN

## 2023-09-22 PROBLEM — D62 ACUTE BLOOD LOSS ANEMIA: Status: ACTIVE | Noted: 2023-09-22

## 2023-09-22 PROBLEM — D64.9 ANEMIA: Status: ACTIVE | Noted: 2023-09-22

## 2023-09-22 LAB
APPEARANCE UR: CLEAR
BACTERIA URNS QL MICRO: NEGATIVE /HPF
BASOPHILS # BLD: 0.1 K/UL (ref 0–0.1)
BASOPHILS # BLD: 0.1 K/UL (ref 0–0.1)
BASOPHILS NFR BLD: 1 % (ref 0–2)
BASOPHILS NFR BLD: 1 % (ref 0–2)
BILIRUB UR QL: NEGATIVE
COLLECT DATE STL: NORMAL
COLOR UR: YELLOW
DIFFERENTIAL METHOD BLD: ABNORMAL
DIFFERENTIAL METHOD BLD: ABNORMAL
EKG ATRIAL RATE: 340 BPM
EKG DIAGNOSIS: NORMAL
EKG Q-T INTERVAL: 318 MS
EKG QRS DURATION: 68 MS
EKG QTC CALCULATION (BAZETT): 386 MS
EKG R AXIS: 28 DEGREES
EKG T AXIS: 201 DEGREES
EKG VENTRICULAR RATE: 89 BPM
EOSINOPHIL # BLD: 0.3 K/UL (ref 0–0.4)
EOSINOPHIL # BLD: 0.3 K/UL (ref 0–0.4)
EOSINOPHIL NFR BLD: 2 % (ref 0–5)
EOSINOPHIL NFR BLD: 3 % (ref 0–5)
EPITH CASTS URNS QL MICRO: ABNORMAL /LPF (ref 0–20)
ERYTHROCYTE [DISTWIDTH] IN BLOOD BY AUTOMATED COUNT: 20.7 % (ref 11.6–14.5)
ERYTHROCYTE [DISTWIDTH] IN BLOOD BY AUTOMATED COUNT: 20.7 % (ref 11.6–14.5)
GLUCOSE BLD STRIP.AUTO-MCNC: 135 MG/DL (ref 70–110)
GLUCOSE BLD STRIP.AUTO-MCNC: 157 MG/DL (ref 70–110)
GLUCOSE BLD STRIP.AUTO-MCNC: 194 MG/DL (ref 70–110)
GLUCOSE BLD STRIP.AUTO-MCNC: 220 MG/DL (ref 70–110)
GLUCOSE UR STRIP.AUTO-MCNC: >1000 MG/DL
HCT VFR BLD AUTO: 23.7 % (ref 36–48)
HCT VFR BLD AUTO: 24.6 % (ref 36–48)
HEMOCCULT SP1 STL QL: POSITIVE
HGB BLD-MCNC: 7.4 G/DL (ref 13–16)
HGB BLD-MCNC: 7.8 G/DL (ref 13–16)
HGB UR QL STRIP: NEGATIVE
IMM GRANULOCYTES # BLD AUTO: 0.1 K/UL (ref 0–0.04)
IMM GRANULOCYTES # BLD AUTO: 0.1 K/UL (ref 0–0.04)
IMM GRANULOCYTES NFR BLD AUTO: 1 % (ref 0–0.5)
IMM GRANULOCYTES NFR BLD AUTO: 1 % (ref 0–0.5)
KETONES UR QL STRIP.AUTO: ABNORMAL MG/DL
LEUKOCYTE ESTERASE UR QL STRIP.AUTO: NEGATIVE
LYMPHOCYTES # BLD: 1.1 K/UL (ref 0.9–3.6)
LYMPHOCYTES # BLD: 1.2 K/UL (ref 0.9–3.6)
LYMPHOCYTES NFR BLD: 10 % (ref 21–52)
LYMPHOCYTES NFR BLD: 9 % (ref 21–52)
MCH RBC QN AUTO: 24.8 PG (ref 24–34)
MCH RBC QN AUTO: 25.4 PG (ref 24–34)
MCHC RBC AUTO-ENTMCNC: 31.2 G/DL (ref 31–37)
MCHC RBC AUTO-ENTMCNC: 31.7 G/DL (ref 31–37)
MCV RBC AUTO: 79.5 FL (ref 78–100)
MCV RBC AUTO: 80.1 FL (ref 78–100)
MONOCYTES # BLD: 1.2 K/UL (ref 0.05–1.2)
MONOCYTES # BLD: 1.4 K/UL (ref 0.05–1.2)
MONOCYTES NFR BLD: 10 % (ref 3–10)
MONOCYTES NFR BLD: 11 % (ref 3–10)
NEUTS SEG # BLD: 9.3 K/UL (ref 1.8–8)
NEUTS SEG # BLD: 9.7 K/UL (ref 1.8–8)
NEUTS SEG NFR BLD: 75 % (ref 40–73)
NEUTS SEG NFR BLD: 76 % (ref 40–73)
NITRITE UR QL STRIP.AUTO: NEGATIVE
NRBC # BLD: 0.02 K/UL (ref 0–0.01)
NRBC # BLD: 0.03 K/UL (ref 0–0.01)
NRBC BLD-RTO: 0.2 PER 100 WBC
NRBC BLD-RTO: 0.2 PER 100 WBC
PERFORMED BY:: ABNORMAL
PH UR STRIP: 5.5 (ref 5–8)
PLATELET # BLD AUTO: 386 K/UL (ref 135–420)
PLATELET # BLD AUTO: 392 K/UL (ref 135–420)
PMV BLD AUTO: 10 FL (ref 9.2–11.8)
PMV BLD AUTO: 10.1 FL (ref 9.2–11.8)
PROT UR STRIP-MCNC: NEGATIVE MG/DL
RBC # BLD AUTO: 2.98 M/UL (ref 4.35–5.65)
RBC # BLD AUTO: 3.07 M/UL (ref 4.35–5.65)
RBC #/AREA URNS HPF: ABNORMAL /HPF (ref 0–2)
SP GR UR REFRACTOMETRY: 1.03 (ref 1–1.03)
UROBILINOGEN UR QL STRIP.AUTO: 1 EU/DL (ref 0.2–1)
WBC # BLD AUTO: 12.2 K/UL (ref 4.6–13.2)
WBC # BLD AUTO: 12.6 K/UL (ref 4.6–13.2)
WBC URNS QL MICRO: ABNORMAL /HPF (ref 0–4)

## 2023-09-22 PROCEDURE — 96374 THER/PROPH/DIAG INJ IV PUSH: CPT

## 2023-09-22 PROCEDURE — 36430 TRANSFUSION BLD/BLD COMPNT: CPT

## 2023-09-22 PROCEDURE — 82272 OCCULT BLD FECES 1-3 TESTS: CPT

## 2023-09-22 PROCEDURE — C9113 INJ PANTOPRAZOLE SODIUM, VIA: HCPCS | Performed by: PHYSICIAN ASSISTANT

## 2023-09-22 PROCEDURE — G0378 HOSPITAL OBSERVATION PER HR: HCPCS

## 2023-09-22 PROCEDURE — 86900 BLOOD TYPING SEROLOGIC ABO: CPT

## 2023-09-22 PROCEDURE — 6360000002 HC RX W HCPCS: Performed by: PHYSICIAN ASSISTANT

## 2023-09-22 PROCEDURE — 6370000000 HC RX 637 (ALT 250 FOR IP): Performed by: PHYSICIAN ASSISTANT

## 2023-09-22 PROCEDURE — 86901 BLOOD TYPING SEROLOGIC RH(D): CPT

## 2023-09-22 PROCEDURE — 2580000003 HC RX 258: Performed by: PHYSICIAN ASSISTANT

## 2023-09-22 PROCEDURE — 82962 GLUCOSE BLOOD TEST: CPT

## 2023-09-22 PROCEDURE — 96376 TX/PRO/DX INJ SAME DRUG ADON: CPT

## 2023-09-22 PROCEDURE — 86850 RBC ANTIBODY SCREEN: CPT

## 2023-09-22 PROCEDURE — P9016 RBC LEUKOCYTES REDUCED: HCPCS

## 2023-09-22 PROCEDURE — 85025 COMPLETE CBC W/AUTO DIFF WBC: CPT

## 2023-09-22 RX ORDER — DEXTROSE MONOHYDRATE 100 MG/ML
INJECTION, SOLUTION INTRAVENOUS CONTINUOUS PRN
Status: DISCONTINUED | OUTPATIENT
Start: 2023-09-22 | End: 2023-09-23 | Stop reason: HOSPADM

## 2023-09-22 RX ORDER — SUCRALFATE 1 G/1
1 TABLET ORAL EVERY 6 HOURS SCHEDULED
Status: DISCONTINUED | OUTPATIENT
Start: 2023-09-22 | End: 2023-09-23 | Stop reason: HOSPADM

## 2023-09-22 RX ORDER — PANTOPRAZOLE SODIUM 40 MG/10ML
40 INJECTION, POWDER, LYOPHILIZED, FOR SOLUTION INTRAVENOUS 2 TIMES DAILY
Status: DISCONTINUED | OUTPATIENT
Start: 2023-09-22 | End: 2023-09-23 | Stop reason: HOSPADM

## 2023-09-22 RX ORDER — ROSUVASTATIN CALCIUM 10 MG/1
10 TABLET, COATED ORAL DAILY
Status: DISCONTINUED | OUTPATIENT
Start: 2023-09-22 | End: 2023-09-23 | Stop reason: HOSPADM

## 2023-09-22 RX ORDER — ACETAMINOPHEN 325 MG/1
650 TABLET ORAL EVERY 6 HOURS PRN
Status: DISCONTINUED | OUTPATIENT
Start: 2023-09-22 | End: 2023-09-23 | Stop reason: HOSPADM

## 2023-09-22 RX ORDER — ONDANSETRON 4 MG/1
4 TABLET, ORALLY DISINTEGRATING ORAL EVERY 8 HOURS PRN
Status: DISCONTINUED | OUTPATIENT
Start: 2023-09-22 | End: 2023-09-23 | Stop reason: HOSPADM

## 2023-09-22 RX ORDER — FERROUS SULFATE 325(65) MG
325 TABLET ORAL
Status: DISCONTINUED | OUTPATIENT
Start: 2023-09-22 | End: 2023-09-23 | Stop reason: HOSPADM

## 2023-09-22 RX ORDER — SODIUM CHLORIDE 0.9 % (FLUSH) 0.9 %
5-40 SYRINGE (ML) INJECTION PRN
Status: DISCONTINUED | OUTPATIENT
Start: 2023-09-22 | End: 2023-09-23 | Stop reason: HOSPADM

## 2023-09-22 RX ORDER — SODIUM CHLORIDE 9 MG/ML
INJECTION, SOLUTION INTRAVENOUS PRN
Status: DISCONTINUED | OUTPATIENT
Start: 2023-09-22 | End: 2023-09-23 | Stop reason: HOSPADM

## 2023-09-22 RX ORDER — ONDANSETRON 2 MG/ML
4 INJECTION INTRAMUSCULAR; INTRAVENOUS EVERY 6 HOURS PRN
Status: DISCONTINUED | OUTPATIENT
Start: 2023-09-22 | End: 2023-09-23 | Stop reason: HOSPADM

## 2023-09-22 RX ORDER — SODIUM CHLORIDE 0.9 % (FLUSH) 0.9 %
5-40 SYRINGE (ML) INJECTION EVERY 12 HOURS SCHEDULED
Status: DISCONTINUED | OUTPATIENT
Start: 2023-09-22 | End: 2023-09-23 | Stop reason: HOSPADM

## 2023-09-22 RX ORDER — HYDROCHLOROTHIAZIDE 25 MG/1
25 TABLET ORAL DAILY
Status: DISCONTINUED | OUTPATIENT
Start: 2023-09-22 | End: 2023-09-22

## 2023-09-22 RX ORDER — INSULIN LISPRO 100 [IU]/ML
0-4 INJECTION, SOLUTION INTRAVENOUS; SUBCUTANEOUS NIGHTLY
Status: DISCONTINUED | OUTPATIENT
Start: 2023-09-22 | End: 2023-09-23 | Stop reason: HOSPADM

## 2023-09-22 RX ORDER — ALLOPURINOL 100 MG/1
100 TABLET ORAL DAILY
Status: DISCONTINUED | OUTPATIENT
Start: 2023-09-22 | End: 2023-09-22

## 2023-09-22 RX ORDER — HYDROCHLOROTHIAZIDE 25 MG/1
12.5 TABLET ORAL DAILY
Status: DISCONTINUED | OUTPATIENT
Start: 2023-09-23 | End: 2023-09-23 | Stop reason: HOSPADM

## 2023-09-22 RX ORDER — ALLOPURINOL 300 MG/1
300 TABLET ORAL DAILY
Status: DISCONTINUED | OUTPATIENT
Start: 2023-09-23 | End: 2023-09-23 | Stop reason: HOSPADM

## 2023-09-22 RX ORDER — DILTIAZEM HYDROCHLORIDE 180 MG/1
180 CAPSULE, COATED, EXTENDED RELEASE ORAL DAILY
Status: DISCONTINUED | OUTPATIENT
Start: 2023-09-22 | End: 2023-09-22

## 2023-09-22 RX ORDER — ACETAMINOPHEN 650 MG/1
650 SUPPOSITORY RECTAL EVERY 6 HOURS PRN
Status: DISCONTINUED | OUTPATIENT
Start: 2023-09-22 | End: 2023-09-23 | Stop reason: HOSPADM

## 2023-09-22 RX ORDER — POLYETHYLENE GLYCOL 3350 17 G/17G
17 POWDER, FOR SOLUTION ORAL DAILY PRN
Status: DISCONTINUED | OUTPATIENT
Start: 2023-09-22 | End: 2023-09-23 | Stop reason: HOSPADM

## 2023-09-22 RX ORDER — DILTIAZEM HYDROCHLORIDE 120 MG/1
240 CAPSULE, COATED, EXTENDED RELEASE ORAL DAILY
Status: DISCONTINUED | OUTPATIENT
Start: 2023-09-23 | End: 2023-09-23 | Stop reason: HOSPADM

## 2023-09-22 RX ORDER — LANOLIN ALCOHOL/MO/W.PET/CERES
400 CREAM (GRAM) TOPICAL DAILY
COMMUNITY

## 2023-09-22 RX ORDER — INSULIN LISPRO 100 [IU]/ML
0-4 INJECTION, SOLUTION INTRAVENOUS; SUBCUTANEOUS
Status: DISCONTINUED | OUTPATIENT
Start: 2023-09-22 | End: 2023-09-23 | Stop reason: HOSPADM

## 2023-09-22 RX ADMIN — SUCRALFATE 1 G: 1 TABLET ORAL at 14:48

## 2023-09-22 RX ADMIN — SODIUM CHLORIDE, PRESERVATIVE FREE 10 ML: 5 INJECTION INTRAVENOUS at 20:39

## 2023-09-22 RX ADMIN — PANTOPRAZOLE SODIUM 40 MG: 40 INJECTION, POWDER, FOR SOLUTION INTRAVENOUS at 20:39

## 2023-09-22 RX ADMIN — DILTIAZEM HYDROCHLORIDE 180 MG: 180 CAPSULE, EXTENDED RELEASE ORAL at 08:35

## 2023-09-22 RX ADMIN — INSULIN LISPRO 1 UNITS: 100 INJECTION, SOLUTION INTRAVENOUS; SUBCUTANEOUS at 08:36

## 2023-09-22 RX ADMIN — ALLOPURINOL 100 MG: 100 TABLET ORAL at 08:36

## 2023-09-22 RX ADMIN — SODIUM CHLORIDE, PRESERVATIVE FREE 10 ML: 5 INJECTION INTRAVENOUS at 08:36

## 2023-09-22 RX ADMIN — PANTOPRAZOLE SODIUM 40 MG: 40 INJECTION, POWDER, FOR SOLUTION INTRAVENOUS at 08:36

## 2023-09-22 RX ADMIN — HYDROCHLOROTHIAZIDE 25 MG: 25 TABLET ORAL at 08:35

## 2023-09-22 RX ADMIN — SUCRALFATE 1 G: 1 TABLET ORAL at 06:08

## 2023-09-22 RX ADMIN — ROSUVASTATIN 10 MG: 10 TABLET, FILM COATED ORAL at 08:35

## 2023-09-22 RX ADMIN — FERROUS SULFATE TAB 325 MG (65 MG ELEMENTAL FE) 325 MG: 325 (65 FE) TAB at 08:35

## 2023-09-22 RX ADMIN — SUCRALFATE 1 G: 1 TABLET ORAL at 18:15

## 2023-09-22 ASSESSMENT — PAIN SCALES - GENERAL
PAINLEVEL_OUTOF10: 0
PAINLEVEL_OUTOF10: 0

## 2023-09-22 ASSESSMENT — ENCOUNTER SYMPTOMS
COUGH: 0
DIARRHEA: 0
STRIDOR: 0
GASTROINTESTINAL NEGATIVE: 1
WHEEZING: 0
COUGH: 1
VOMITING: 0
BLOOD IN STOOL: 0
NAUSEA: 0
SHORTNESS OF BREATH: 1
ABDOMINAL PAIN: 0
RECTAL PAIN: 0

## 2023-09-22 ASSESSMENT — PAIN - FUNCTIONAL ASSESSMENT: PAIN_FUNCTIONAL_ASSESSMENT: NONE - DENIES PAIN

## 2023-09-22 NOTE — ED TRIAGE NOTES
Reports weakness, shaking, no energy, cough and increased dyspnea.  Symptoms have been going on for 6 months worse tonight

## 2023-09-22 NOTE — CONSENT
Informed Consent for Blood Component Transfusion Note    I have discussed with the patient the rationale for blood component transfusion; its benefits in treating or preventing fatigue, organ damage, or death; and its risk which includes mild transfusion reactions, rare risk of blood borne infection, or more serious but rare reactions. I have discussed the alternatives to transfusion, including the risk and consequences of not receiving transfusion. The patient had an opportunity to ask questions and had agreed to proceed with transfusion of blood components.     Electronically signed by Hemalatha Gonzalez DO on 9/22/23 at 5:40 AM EDT

## 2023-09-22 NOTE — CARE COORDINATION
Case Management Assessment  Initial Evaluation    Date/Time of Evaluation: 9/22/2023 10:53 AM  Assessment Completed by: Estrella Thakkar RN    If patient is discharged prior to next notation, then this note serves as note for discharge by case management. Patient Name: Nathalie Looney                   YOB: 1945  Diagnosis: Acute blood loss anemia [D62]  Anemia [D64.9]  Occult blood positive stool [R19.5]  Fatigue, unspecified type [R53.83]  Anemia, unspecified type [D64.9]                   Date / Time: 9/21/2023  9:24 PM    Patient Admission Status: Observation   Readmission Risk (Low < 19, Mod (19-27), High > 27): No data recorded  Current PCP: No primary care provider on file. PCP verified by CM? Yes (Dr. Moisés Valdes)    Chart Reviewed: Yes      History Provided by: Patient  Patient Orientation: Alert and Oriented    Patient Cognition: Alert    Hospitalization in the last 30 days (Readmission):  No    If yes, Readmission Assessment in CM Navigator will be completed.     Advance Directives:      Code Status: Full Code   Patient's Primary Decision Maker is: Legal Next of Kin      Discharge Planning:    Patient lives with: (P) Alone Type of Home: (P) House  Primary Care Giver: Self  Patient Support Systems include: Friends/Neighbors   Current Financial resources: (P)  (Eliel Yepez)  Current community resources: (P) None  Current services prior to admission: (P) None            Current DME:              Type of Home Care services:  (P) None    ADLS  Prior functional level: (P) Independent in ADLs/IADLs  Current functional level: (P) Independent in ADLs/IADLs    PT AM-PAC:   /24  OT AM-PAC:   /24    Family can provide assistance at DC: (P) Yes  Would you like Case Management to discuss the discharge plan with any other family members/significant others, and if so, who? (P) No  Plans to Return to Present Housing: (P) Yes  Other Identified Issues/Barriers to RETURNING to current housing: None  Potential

## 2023-09-22 NOTE — ED NOTES
TRANSFER - OUT REPORT:    Verbal report given to [facility] on Nathalie Looney  being transferred to [address] [place] [address] for routine progression of patient care       Report consisted of patient's Situation, Background, Assessment and   Recommendations(SBAR). Information from the following report(s) Nurse Handoff Report and ED Encounter Summary was reviewed with the receiving nurse. [place] Fall Assessment:    Presents to emergency department  because of falls (Syncope, seizure, or loss of consciousness): No  Age > 70: No  Altered Mental Status, Intoxication with alcohol or substance confusion (Disorientation, impaired judgment, poor safety awaremess, or inability to follow instructions): No  Impaired Mobility: Ambulates or transfers with assistive devices or assistance; Unable to ambulate or transer.: No  Nursing Judgement: No          Lines:   Peripheral IV 09/21/23 Left Antecubital (Active)        Opportunity for questions and clarification was provided.       Patient transported with:  Registered Nurse          Darnell Bell RN  09/22/23 2457

## 2023-09-22 NOTE — PROGRESS NOTES
Patient was received from the ER via stretcher, ambulated independently to bed. Patient was alert and oriented x3 with no confusion noted. Patient denied pain at this time. Patient positioned himself in bed independently. Patient was educated on call bell system, unit procedures, fall precautions and safety. Bed alarm activated due to recent fall history.

## 2023-09-22 NOTE — PROGRESS NOTES
Admission Medication Reconciliation:    Information obtained from:  [facility]    Comments/Recommendations: Reviewed PTA medications and patient's allergies. Reviewed and updated, discussed changed with Dr David Dey        Allergies:  Penicillins    Significant PMH/Disease States:   Past Medical History:   Diagnosis Date    A-fib ([facility])     Asbestosis ([address] [facility])     Diabetes mellitus ([address] [facility])     Hypertension      Chief Complaint for this Admission:    Chief Complaint   Patient presents with    Shortness of Breath    Fatigue     Prior to Admission Medications:   Prior to Admission medications    Medication Sig Start Date End Date Taking?  Authorizing Provider   magnesium oxide (MAG-OX) 400 (240 Mg) MG tablet Take 1 tablet by mouth daily   Yes Historical Provider, MD   hydroCHLOROthiazide (HYDRODIURIL) 12.5 MG tablet Take 1 tablet by mouth daily    Historical Provider, MD   ferrous sulfate (IRON 325) 325 (65 Fe) MG tablet Take 1 tablet by mouth daily (with breakfast)    Historical Provider, MD   apixaban (ELIQUIS) 2.5 MG TABS tablet Take by mouth 2 times daily    Historical Provider, MD   omeprazole (PRILOSEC) 20 MG delayed release capsule Take 1 capsule by mouth daily    Historical Provider, MD   allopurinol (ZYLOPRIM) 300 MG tablet Take 1 tablet by mouth daily    Historical Provider, MD   empagliflozin (JARDIANCE) 25 MG tablet Take 1 tablet by mouth daily    Historical Provider, MD   dilTIAZem HCl  MG TB24 Take 240 mg by mouth daily    Historical Provider, MD   rosuvastatin (CRESTOR) 10 MG tablet Take 1 tablet by mouth daily    Historical Provider, MD Angela Wen, [facility]

## 2023-09-22 NOTE — PROGRESS NOTES
Advance Care Planning     Advance Care Planning Inpatient Note  Spiritual Care Department    Today's Date: 9/22/2023  Unit: [address]    Received request from rounding. Upon review of chart and communication with care team, patient's decision making abilities are not in question. . Patient was/were present in the room during visit. Goals of ACP Conversation:  Discuss advance care planning documents    Health Care Decision Makers:     No healthcare decision makers have been documented. Click here to complete [address] including selection of the Healthcare Decision Maker Relationship (ie \"Primary\")  Summary:  No Decision Maker named by patient at this time    Advance Care Planning Documents (Patient Wishes):  None     Assessment:     09/22/23 1108   Encounter Summary   Encounter Overview/Reason  Initial Encounter   Service Provided For: Patient   Referral/Consult From: [facility]   Support System Spouse   Last Encounter  09/22/23  ([facility])   Complexity of Encounter Low   Begin Time 1034   End Time  1040   Total Time Calculated 6 min   Advance Care Planning   Type ACP conversation   Assessment/Intervention/Outcome   Assessment Calm   Intervention Sustaining Presence/Ministry of presence;Prayer (assurance of)/[place]; Active listening   Outcome Receptive         Interventions:  Encouraged ongoing ACP conversation with future decision makers and loved ones  Reviewed but did not complete ACP document    Care Preferences Communicated:   No    Outcomes/Plan:  ACP Discussion: Refused    Electronically signed by Kenzie Almanza on 9/22/2023 at 11:10 AM

## 2023-09-22 NOTE — H&P
sinuses and mastoid air cells are clear. Impression  No acute intracranial process identified        Xray Result (most recent):  XR CHEST PORTABLE 09/21/2023    Narrative  EXAM:  XR CHEST PORTABLE    INDICATION: Shortness of breath, palpitations    COMPARISON: 7/28/2023    TECHNIQUE: portable chest AP view    FINDINGS: The cardiac silhouette is within normal limits. The pulmonary  vasculature is within normal limits. The lungs and pleural spaces are clear. The visualized bones and upper abdomen  are age-appropriate. Impression  No acute process on portable chest.        _______________________________________________________________________    TOTAL TIME:  70 Minutes    Critical Care Provided     Minutes non procedure based    Signed: Familia Yin PA-C    Procedures: see electronic medical records for all procedures/Xrays and details which were not copied into this note but were reviewed prior to creation of Plan.

## 2023-09-23 VITALS
HEIGHT: 68 IN | BODY MASS INDEX: 29.86 KG/M2 | HEART RATE: 72 BPM | DIASTOLIC BLOOD PRESSURE: 66 MMHG | RESPIRATION RATE: 16 BRPM | TEMPERATURE: 97.2 F | SYSTOLIC BLOOD PRESSURE: 129 MMHG | WEIGHT: 197 LBS | OXYGEN SATURATION: 97 %

## 2023-09-23 LAB
ABO + RH BLD: NORMAL
ALBUMIN SERPL-MCNC: 2.5 G/DL (ref 3.4–5)
ALBUMIN/GLOB SERPL: 0.6 (ref 0.8–1.7)
ALP SERPL-CCNC: 70 U/L (ref 45–117)
ALT SERPL-CCNC: 22 U/L (ref 16–61)
ANION GAP SERPL CALC-SCNC: 9 MMOL/L (ref 3–18)
AST SERPL W P-5'-P-CCNC: 14 U/L (ref 10–38)
BASOPHILS # BLD: 0.1 K/UL (ref 0–0.1)
BASOPHILS # BLD: 0.1 K/UL (ref 0–0.1)
BASOPHILS NFR BLD: 1 % (ref 0–2)
BASOPHILS NFR BLD: 1 % (ref 0–2)
BILIRUB SERPL-MCNC: 0.3 MG/DL (ref 0.2–1)
BLD PROD TYP BPU: NORMAL
BLOOD BANK DISPENSE STATUS: NORMAL
BLOOD GROUP ANTIBODIES SERPL: NEGATIVE
BPU ID: NORMAL
BUN SERPL-MCNC: 9 MG/DL (ref 7–18)
BUN/CREAT SERPL: 9 (ref 12–20)
CA-I BLD-MCNC: 8.2 MG/DL (ref 8.5–10.1)
CHLORIDE SERPL-SCNC: 104 MMOL/L (ref 100–111)
CO2 SERPL-SCNC: 25 MMOL/L (ref 21–32)
CREAT SERPL-MCNC: 0.96 MG/DL (ref 0.6–1.3)
CROSSMATCH RESULT: NORMAL
DIFFERENTIAL METHOD BLD: ABNORMAL
DIFFERENTIAL METHOD BLD: ABNORMAL
EOSINOPHIL # BLD: 0.4 K/UL (ref 0–0.4)
EOSINOPHIL # BLD: 0.4 K/UL (ref 0–0.4)
EOSINOPHIL NFR BLD: 3 % (ref 0–5)
EOSINOPHIL NFR BLD: 4 % (ref 0–5)
ERYTHROCYTE [DISTWIDTH] IN BLOOD BY AUTOMATED COUNT: 20.4 % (ref 11.6–14.5)
ERYTHROCYTE [DISTWIDTH] IN BLOOD BY AUTOMATED COUNT: 20.7 % (ref 11.6–14.5)
EST. AVERAGE GLUCOSE BLD GHB EST-MCNC: 143 MG/DL
GLOBULIN SER CALC-MCNC: 4.1 G/DL (ref 2–4)
GLUCOSE BLD STRIP.AUTO-MCNC: 157 MG/DL (ref 70–110)
GLUCOSE SERPL-MCNC: 133 MG/DL (ref 74–99)
HBA1C MFR BLD: 6.6 % (ref 4.2–5.6)
HCT VFR BLD AUTO: 25.3 % (ref 36–48)
HCT VFR BLD AUTO: 25.7 % (ref 36–48)
HGB BLD-MCNC: 7.8 G/DL (ref 13–16)
HGB BLD-MCNC: 8 G/DL (ref 13–16)
IMM GRANULOCYTES # BLD AUTO: 0.1 K/UL (ref 0–0.04)
IMM GRANULOCYTES # BLD AUTO: 0.1 K/UL (ref 0–0.04)
IMM GRANULOCYTES NFR BLD AUTO: 1 % (ref 0–0.5)
IMM GRANULOCYTES NFR BLD AUTO: 1 % (ref 0–0.5)
LYMPHOCYTES # BLD: 1.2 K/UL (ref 0.9–3.6)
LYMPHOCYTES # BLD: 1.6 K/UL (ref 0.9–3.6)
LYMPHOCYTES NFR BLD: 12 % (ref 21–52)
LYMPHOCYTES NFR BLD: 13 % (ref 21–52)
MCH RBC QN AUTO: 24.3 PG (ref 24–34)
MCH RBC QN AUTO: 25.1 PG (ref 24–34)
MCHC RBC AUTO-ENTMCNC: 30.8 G/DL (ref 31–37)
MCHC RBC AUTO-ENTMCNC: 31.1 G/DL (ref 31–37)
MCV RBC AUTO: 78.8 FL (ref 78–100)
MCV RBC AUTO: 80.6 FL (ref 78–100)
MONOCYTES # BLD: 0.9 K/UL (ref 0.05–1.2)
MONOCYTES # BLD: 1.2 K/UL (ref 0.05–1.2)
MONOCYTES NFR BLD: 10 % (ref 3–10)
MONOCYTES NFR BLD: 10 % (ref 3–10)
NEUTS SEG # BLD: 6.8 K/UL (ref 1.8–8)
NEUTS SEG # BLD: 8.8 K/UL (ref 1.8–8)
NEUTS SEG NFR BLD: 72 % (ref 40–73)
NEUTS SEG NFR BLD: 72 % (ref 40–73)
NRBC # BLD: 0.03 K/UL (ref 0–0.01)
NRBC # BLD: 0.05 K/UL (ref 0–0.01)
NRBC BLD-RTO: 0.3 PER 100 WBC
NRBC BLD-RTO: 0.4 PER 100 WBC
PERFORMED BY:: ABNORMAL
PLATELET # BLD AUTO: 363 K/UL (ref 135–420)
PLATELET # BLD AUTO: 379 K/UL (ref 135–420)
PMV BLD AUTO: 9.3 FL (ref 9.2–11.8)
PMV BLD AUTO: 9.8 FL (ref 9.2–11.8)
POTASSIUM SERPL-SCNC: 3.5 MMOL/L (ref 3.5–5.5)
PROT SERPL-MCNC: 6.6 G/DL (ref 6.4–8.2)
RBC # BLD AUTO: 3.19 M/UL (ref 4.35–5.65)
RBC # BLD AUTO: 3.21 M/UL (ref 4.35–5.65)
SODIUM SERPL-SCNC: 138 MMOL/L (ref 136–145)
SPECIMEN EXP DATE BLD: NORMAL
TRANSFUSION STATUS PATIENT QL: NORMAL
UNIT DIVISION: 0
WBC # BLD AUTO: 12.1 K/UL (ref 4.6–13.2)
WBC # BLD AUTO: 9.4 K/UL (ref 4.6–13.2)

## 2023-09-23 PROCEDURE — 6360000002 HC RX W HCPCS: Performed by: PHYSICIAN ASSISTANT

## 2023-09-23 PROCEDURE — 80053 COMPREHEN METABOLIC PANEL: CPT

## 2023-09-23 PROCEDURE — C9113 INJ PANTOPRAZOLE SODIUM, VIA: HCPCS | Performed by: PHYSICIAN ASSISTANT

## 2023-09-23 PROCEDURE — 83036 HEMOGLOBIN GLYCOSYLATED A1C: CPT

## 2023-09-23 PROCEDURE — 6370000000 HC RX 637 (ALT 250 FOR IP): Performed by: PHYSICIAN ASSISTANT

## 2023-09-23 PROCEDURE — 36415 COLL VENOUS BLD VENIPUNCTURE: CPT

## 2023-09-23 PROCEDURE — 2580000003 HC RX 258: Performed by: PHYSICIAN ASSISTANT

## 2023-09-23 PROCEDURE — 6370000000 HC RX 637 (ALT 250 FOR IP): Performed by: INTERNAL MEDICINE

## 2023-09-23 PROCEDURE — G0378 HOSPITAL OBSERVATION PER HR: HCPCS

## 2023-09-23 PROCEDURE — 82962 GLUCOSE BLOOD TEST: CPT

## 2023-09-23 PROCEDURE — 85025 COMPLETE CBC W/AUTO DIFF WBC: CPT

## 2023-09-23 RX ORDER — PANTOPRAZOLE SODIUM 40 MG/1
40 TABLET, DELAYED RELEASE ORAL
Qty: 60 TABLET | Refills: 5 | Status: SHIPPED | OUTPATIENT
Start: 2023-09-23

## 2023-09-23 RX ORDER — PANTOPRAZOLE SODIUM 40 MG/1
40 TABLET, DELAYED RELEASE ORAL
Status: COMPLETED | OUTPATIENT
Start: 2023-09-23 | End: 2023-09-23

## 2023-09-23 RX ORDER — SUCRALFATE 1 G/1
1 TABLET ORAL 4 TIMES DAILY
Qty: 120 TABLET | Refills: 3 | Status: SHIPPED | OUTPATIENT
Start: 2023-09-23

## 2023-09-23 RX ADMIN — HYDROCHLOROTHIAZIDE 12.5 MG: 25 TABLET ORAL at 09:06

## 2023-09-23 RX ADMIN — SUCRALFATE 1 G: 1 TABLET ORAL at 05:50

## 2023-09-23 RX ADMIN — SODIUM CHLORIDE, PRESERVATIVE FREE 10 ML: 5 INJECTION INTRAVENOUS at 09:19

## 2023-09-23 RX ADMIN — ROSUVASTATIN 10 MG: 10 TABLET, FILM COATED ORAL at 09:06

## 2023-09-23 RX ADMIN — FERROUS SULFATE TAB 325 MG (65 MG ELEMENTAL FE) 325 MG: 325 (65 FE) TAB at 09:06

## 2023-09-23 RX ADMIN — DILTIAZEM HYDROCHLORIDE 240 MG: 120 CAPSULE, EXTENDED RELEASE ORAL at 09:07

## 2023-09-23 RX ADMIN — SUCRALFATE 1 G: 1 TABLET ORAL at 00:18

## 2023-09-23 RX ADMIN — PANTOPRAZOLE SODIUM 40 MG: 40 TABLET, DELAYED RELEASE ORAL at 09:28

## 2023-09-23 RX ADMIN — ALLOPURINOL 300 MG: 300 TABLET ORAL at 09:06

## 2023-09-23 NOTE — PROGRESS NOTES
2030 - Rounded on pt, pt resting in bed. Blood glucose checked and is 194. Pt c/o dark stool when having a BM. V/s obtained and physical assessment completed. Pt c/o dull aching pain to right lower quad of abd for 3x days that is intermittent. IV Protonix administered as ordered per MAR. Pt tolerated well. Snack provided to pt. CBWR.     0020 - Rounded on pt, v/s obtained and scheduled medication administered per MAR. CBWR.     0330 - Rounded on pt, v/s obtained. No needs expressed at this time. CBWR.

## 2023-09-23 NOTE — DISCHARGE INSTRUCTIONS
- Encourage the ambulation with fall precautions  - Encourage good hydration  - Compliance with medication , diet and follow up appointments  - Schedule a follow up with PCP in 1 weeks  - Schedule a follow up with Gastroenterology as soon as possible   -Take all medications with you to all your follow up appointments  - New / Changes in home medications:  1- NEW Protonix 40 mg twice a day first before the breakfast  2- NEW Carafate 4 times a day   3- Do NOT take Eliquis until 48 hr

## 2023-09-23 NOTE — PROGRESS NOTES
Comprehensive Nutrition Assessment    Type and Reason for Visit:  Initial, Positive Nutrition Screen    Nutrition Recommendations/Plan:   Carbohydrate consistent diet  Discontinue supplementation (Not warranted)   High Fe cereal (Grapenuts, Multigrain Cheerios, Ashraf Austell, etc) to trial to help with Fe repletion especially if GI recommends discontinue FE supplementation for  help with pt monitoring stool color for further bleeding. Malnutrition Assessment:  Malnutrition Status:  No malnutrition (09/23/23 1006)    Context:  Acute Illness     Findings of the 6 clinical characteristics of malnutrition:  Energy Intake:  No significant decrease in energy intake  Weight Loss:  No significant weight loss (6.1% in 3 months with healthier eating habits implemented)     Body Fat Loss:  No significant body fat loss     Muscle Mass Loss:  No significant muscle mass loss    Fluid Accumulation:  No significant fluid accumulation     Strength:  Not Performed    Nutrition Assessment:    Pt admitted Anemia, JOANNE, DM, HTN, Atrial Fibrillation. He states his usual weight is 210, no change in appetite, but he has been following a healthier diet resulting in 20 pound weight loss according to his scale at home. Diet history - minimal fried foods, no bread, rice, pasta, white potatoe,  2-3 meals per day with meat, vegetables, fruit. He states he takes his iron pills which makes it difficult to determine if he is bleeding or if the Fe pills are causing the dark stool color. Pt denies educational needs on his diet, but listened to RD about high FE cereals to trial to help with repletion. Nutrition Related Findings:    Pt denies constipation. Wound Type: None       Current Nutrition Intake & Therapies:    Average Meal Intake: %  Average Supplements Intake: 26-50%  ADULT DIET;  Regular; 4 carb choices (60 gm/meal)  ADULT ORAL NUTRITION SUPPLEMENT; Breakfast, Lunch, Dinner; Diabetic Oral

## 2023-09-23 NOTE — DISCHARGE SUMMARY
Eosinophils Absolute 0.3 0.0 - 0.4 K/UL    Basophils Absolute 0.1 0.0 - 0.1 K/UL    Absolute Immature Granulocyte 0.1 (H) 0.00 - 0.04 K/UL    Differential Type AUTOMATED     POCT Glucose    Collection Time: 09/22/23  4:08 PM   Result Value Ref Range    POC Glucose 157 (H) 70 - 110 mg/dL    Performed by: Lizbet Jauregui    POCT Glucose    Collection Time: 09/22/23  8:33 PM   Result Value Ref Range    POC Glucose 194 (H) 70 - 110 mg/dL    Performed by: Tres Shields    CBC with Auto Differential    Collection Time: 09/22/23  8:36 PM   Result Value Ref Range    WBC 12.2 4.6 - 13.2 K/uL    RBC 2.98 (L) 4.35 - 5.65 M/uL    Hemoglobin 7.4 (L) 13.0 - 16.0 g/dL    Hematocrit 23.7 (L) 36.0 - 48.0 %    MCV 79.5 78.0 - 100.0 FL    MCH 24.8 24.0 - 34.0 PG    MCHC 31.2 31.0 - 37.0 g/dL    RDW 20.7 (H) 11.6 - 14.5 %    Platelets 243 601 - 726 K/uL    MPV 10.0 9.2 - 11.8 FL    Nucleated RBCs 0.2 (H) 0.0  WBC    nRBC 0.03 (H) 0.00 - 0.01 K/uL    Neutrophils % 75 (H) 40 - 73 %    Lymphocytes % 10 (L) 21 - 52 %    Monocytes % 10 3 - 10 %    Eosinophils % 3 0 - 5 %    Basophils % 1 0 - 2 %    Immature Granulocytes 1 (H) 0 - 0.5 %    Neutrophils Absolute 9.3 (H) 1.8 - 8.0 K/UL    Lymphocytes Absolute 1.2 0.9 - 3.6 K/UL    Monocytes Absolute 1.2 0.05 - 1.2 K/UL    Eosinophils Absolute 0.3 0.0 - 0.4 K/UL    Basophils Absolute 0.1 0.0 - 0.1 K/UL    Absolute Immature Granulocyte 0.1 (H) 0.00 - 0.04 K/UL    Differential Type AUTOMATED     CBC with Auto Differential    Collection Time: 09/23/23  2:30 AM   Result Value Ref Range    WBC 9.4 4.6 - 13.2 K/uL    RBC 3.21 (L) 4.35 - 5.65 M/uL    Hemoglobin 7.8 (L) 13.0 - 16.0 g/dL    Hematocrit 25.3 (L) 36.0 - 48.0 %    MCV 78.8 78.0 - 100.0 FL    MCH 24.3 24.0 - 34.0 PG    MCHC 30.8 (L) 31.0 - 37.0 g/dL    RDW 20.7 (H) 11.6 - 14.5 %    Platelets 023 306 - 757 K/uL    MPV 9.8 9.2 - 11.8 FL    Nucleated RBCs 0.3 (H) 0.0  WBC    nRBC 0.03 (H) 0.00 - 0.01 K/uL    Neutrophils % 72 40 -

## 2023-09-23 NOTE — PROGRESS NOTES
Received report from CYNTHIA Allan, [address] [facility]- Assumed care of patient. Patient resting with eyes open. No distress noted, patient has no complaints at this time. Call bell within reach. Bed in lowest position. Bed Alarm is on. Vital signs taken. VSS.   0315- Pt medicated according to Mar. Patient tolerated well. Patient is getting dressed and ready for D/C.   1015- Patient given D/C instructions. IV/Tele removed. Patient taken out in wheelchair.

## 2024-08-09 ENCOUNTER — HOSPITAL ENCOUNTER (OUTPATIENT)
Age: 79
Discharge: HOME | End: 2024-08-09
Attending: SURGERY
Payer: OTHER GOVERNMENT

## 2024-08-09 ENCOUNTER — HOSPITAL ENCOUNTER (OUTPATIENT)
Age: 79
End: 2024-08-09
Payer: OTHER GOVERNMENT

## 2024-08-09 ENCOUNTER — TRANSCRIBE ORDERS (OUTPATIENT)
Age: 79
End: 2024-08-09

## 2024-08-09 DIAGNOSIS — Z93.2 ILEOSTOMY STATUS (HCC): ICD-10-CM

## 2024-08-09 DIAGNOSIS — Z93.2 ILEOSTOMY STATUS (HCC): Primary | ICD-10-CM

## 2024-08-09 LAB — CREAT SERPL-MCNC: 1.45 MG/DL (ref 0.6–1.3)

## 2024-08-09 PROCEDURE — 74177 CT ABD & PELVIS W/CONTRAST: CPT

## 2024-08-09 PROCEDURE — 36415 COLL VENOUS BLD VENIPUNCTURE: CPT

## 2024-08-09 PROCEDURE — 82565 ASSAY OF CREATININE: CPT

## 2024-08-09 PROCEDURE — 6360000004 HC RX CONTRAST MEDICATION: Performed by: SURGERY

## 2024-08-09 RX ADMIN — IOPAMIDOL 93 ML: 755 INJECTION, SOLUTION INTRAVENOUS at 14:15

## 2025-03-15 ENCOUNTER — APPOINTMENT (OUTPATIENT)
Age: 80
End: 2025-03-15
Payer: OTHER GOVERNMENT

## 2025-03-15 ENCOUNTER — HOSPITAL ENCOUNTER (EMERGENCY)
Age: 80
Discharge: HOME | End: 2025-03-15
Attending: FAMILY MEDICINE
Payer: OTHER GOVERNMENT

## 2025-03-15 VITALS
BODY MASS INDEX: 27.13 KG/M2 | TEMPERATURE: 98.4 F | RESPIRATION RATE: 18 BRPM | SYSTOLIC BLOOD PRESSURE: 129 MMHG | HEART RATE: 108 BPM | WEIGHT: 179 LBS | HEIGHT: 68 IN | OXYGEN SATURATION: 99 % | DIASTOLIC BLOOD PRESSURE: 77 MMHG

## 2025-03-15 DIAGNOSIS — J90 PLEURAL EFFUSION, RIGHT: ICD-10-CM

## 2025-03-15 DIAGNOSIS — R05.2 SUBACUTE COUGH: Primary | ICD-10-CM

## 2025-03-15 LAB
ALBUMIN SERPL-MCNC: 2.1 G/DL (ref 3.4–5)
ALBUMIN/GLOB SERPL: 0.3 (ref 0.8–1.7)
ALP SERPL-CCNC: 329 U/L (ref 45–117)
ALT SERPL-CCNC: 53 U/L (ref 16–61)
ANION GAP SERPL CALC-SCNC: 11 MMOL/L (ref 3–18)
AST SERPL W P-5'-P-CCNC: 149 U/L (ref 10–38)
BASOPHILS # BLD: 0.05 K/UL (ref 0–0.1)
BASOPHILS NFR BLD: 0.4 % (ref 0–2)
BILIRUB SERPL-MCNC: 0.7 MG/DL (ref 0.2–1)
BNP SERPL-MCNC: 666 PG/ML (ref 0–1800)
BUN SERPL-MCNC: 8 MG/DL (ref 7–18)
BUN/CREAT SERPL: 9 (ref 12–20)
CA-I BLD-MCNC: 8.7 MG/DL (ref 8.5–10.1)
CHLORIDE SERPL-SCNC: 98 MMOL/L (ref 100–111)
CO2 SERPL-SCNC: 24 MMOL/L (ref 21–32)
CREAT SERPL-MCNC: 0.92 MG/DL (ref 0.6–1.3)
DIFFERENTIAL METHOD BLD: ABNORMAL
EKG ATRIAL RATE: 116 BPM
EKG DIAGNOSIS: NORMAL
EKG P AXIS: 19 DEGREES
EKG P-R INTERVAL: 156 MS
EKG Q-T INTERVAL: 348 MS
EKG QRS DURATION: 60 MS
EKG QTC CALCULATION (BAZETT): 483 MS
EKG R AXIS: 5 DEGREES
EKG T AXIS: 10 DEGREES
EKG VENTRICULAR RATE: 116 BPM
EOSINOPHIL # BLD: 0.04 K/UL (ref 0–0.4)
EOSINOPHIL NFR BLD: 0.3 % (ref 0–5)
ERYTHROCYTE [DISTWIDTH] IN BLOOD BY AUTOMATED COUNT: 22.5 % (ref 11.6–14.5)
GLOBULIN SER CALC-MCNC: 6.1 G/DL (ref 2–4)
GLUCOSE SERPL-MCNC: 185 MG/DL (ref 74–99)
HCT VFR BLD AUTO: 30.2 % (ref 36–48)
HGB BLD-MCNC: 9.1 G/DL (ref 13–16)
IMM GRANULOCYTES # BLD AUTO: 0.11 K/UL (ref 0–0.04)
IMM GRANULOCYTES NFR BLD AUTO: 0.9 % (ref 0–0.5)
LYMPHOCYTES # BLD: 0.89 K/UL (ref 0.9–3.6)
LYMPHOCYTES NFR BLD: 7.5 % (ref 21–52)
MCH RBC QN AUTO: 25.4 PG (ref 24–34)
MCHC RBC AUTO-ENTMCNC: 30.1 G/DL (ref 31–37)
MCV RBC AUTO: 84.4 FL (ref 78–100)
MONOCYTES # BLD: 0.98 K/UL (ref 0.05–1.2)
MONOCYTES NFR BLD: 8.2 % (ref 3–10)
NEUTS SEG # BLD: 9.83 K/UL (ref 1.8–8)
NEUTS SEG NFR BLD: 82.7 % (ref 40–73)
NRBC # BLD: 0.03 K/UL (ref 0–0.01)
NRBC BLD-RTO: 0.3 PER 100 WBC
PLATELET # BLD AUTO: 448 K/UL (ref 135–420)
PMV BLD AUTO: 10.1 FL (ref 9.2–11.8)
POTASSIUM SERPL-SCNC: 3.8 MMOL/L (ref 3.5–5.5)
PROT SERPL-MCNC: 8.2 G/DL (ref 6.4–8.2)
RBC # BLD AUTO: 3.58 M/UL (ref 4.35–5.65)
RBC MORPH BLD: ABNORMAL
SODIUM SERPL-SCNC: 133 MMOL/L (ref 136–145)
TROPONIN I SERPL HS-MCNC: <3 NG/L (ref 0–78)
WBC # BLD AUTO: 11.9 K/UL (ref 4.6–13.2)

## 2025-03-15 PROCEDURE — 80053 COMPREHEN METABOLIC PANEL: CPT

## 2025-03-15 PROCEDURE — 85025 COMPLETE CBC W/AUTO DIFF WBC: CPT

## 2025-03-15 PROCEDURE — 6370000000 HC RX 637 (ALT 250 FOR IP): Performed by: FAMILY MEDICINE

## 2025-03-15 PROCEDURE — 93005 ELECTROCARDIOGRAM TRACING: CPT | Performed by: FAMILY MEDICINE

## 2025-03-15 PROCEDURE — 99285 EMERGENCY DEPT VISIT HI MDM: CPT

## 2025-03-15 PROCEDURE — 2580000003 HC RX 258: Performed by: FAMILY MEDICINE

## 2025-03-15 PROCEDURE — 71046 X-RAY EXAM CHEST 2 VIEWS: CPT

## 2025-03-15 PROCEDURE — 84484 ASSAY OF TROPONIN QUANT: CPT

## 2025-03-15 PROCEDURE — 96360 HYDRATION IV INFUSION INIT: CPT

## 2025-03-15 PROCEDURE — 83880 ASSAY OF NATRIURETIC PEPTIDE: CPT

## 2025-03-15 RX ORDER — BENZONATATE 100 MG/1
200 CAPSULE ORAL
Status: COMPLETED | OUTPATIENT
Start: 2025-03-15 | End: 2025-03-15

## 2025-03-15 RX ORDER — MULTIVIT-MIN/IRON/FOLIC ACID/K 18-600-40
2000 CAPSULE ORAL DAILY
COMMUNITY

## 2025-03-15 RX ORDER — ALBUTEROL SULFATE 90 UG/1
2 INHALANT RESPIRATORY (INHALATION) EVERY 4 HOURS PRN
COMMUNITY
Start: 2024-12-20

## 2025-03-15 RX ORDER — BENZONATATE 100 MG/1
100-200 CAPSULE ORAL 3 TIMES DAILY PRN
Qty: 60 CAPSULE | Refills: 0 | Status: SHIPPED | OUTPATIENT
Start: 2025-03-15 | End: 2025-03-22

## 2025-03-15 RX ORDER — 0.9 % SODIUM CHLORIDE 0.9 %
500 INTRAVENOUS SOLUTION INTRAVENOUS ONCE
Status: COMPLETED | OUTPATIENT
Start: 2025-03-15 | End: 2025-03-15

## 2025-03-15 RX ORDER — CODEINE PHOSPHATE AND GUAIFENESIN 10; 100 MG/5ML; MG/5ML
10 SOLUTION ORAL
Status: COMPLETED | OUTPATIENT
Start: 2025-03-15 | End: 2025-03-15

## 2025-03-15 RX ORDER — CODEINE PHOSPHATE AND GUAIFENESIN 10; 100 MG/5ML; MG/5ML
10 SOLUTION ORAL
Qty: 50 ML | Refills: 0 | Status: SHIPPED | OUTPATIENT
Start: 2025-03-15 | End: 2025-03-20

## 2025-03-15 RX ORDER — OXYCODONE HYDROCHLORIDE 10 MG/1
5 TABLET ORAL
Refills: 0 | Status: COMPLETED | OUTPATIENT
Start: 2025-03-15 | End: 2025-03-15

## 2025-03-15 RX ADMIN — SODIUM CHLORIDE 500 ML: 9 INJECTION, SOLUTION INTRAVENOUS at 01:18

## 2025-03-15 RX ADMIN — OXYCODONE HYDROCHLORIDE 5 MG: 10 TABLET ORAL at 02:45

## 2025-03-15 RX ADMIN — GUAIFENESIN AND CODEINE PHOSPHATE 10 ML: 100; 10 SOLUTION ORAL at 01:13

## 2025-03-15 RX ADMIN — BENZONATATE 200 MG: 100 CAPSULE ORAL at 01:43

## 2025-03-15 ASSESSMENT — PAIN - FUNCTIONAL ASSESSMENT: PAIN_FUNCTIONAL_ASSESSMENT: NONE - DENIES PAIN

## 2025-03-15 ASSESSMENT — PAIN SCALES - GENERAL: PAINLEVEL_OUTOF10: 0

## 2025-03-15 ASSESSMENT — ENCOUNTER SYMPTOMS
SHORTNESS OF BREATH: 1
STRIDOR: 0
WHEEZING: 0
COUGH: 1
GASTROINTESTINAL NEGATIVE: 1
CHEST TIGHTNESS: 0

## 2025-03-15 NOTE — DISCHARGE INSTRUCTIONS
Your lab work and EKG did not show any acute findings.  Your chest x-ray showed fluid on your right lung, which may be a source of your cough.  Follow-up with your primary doctor to recheck this and discuss further management.  Return to the emergency department for new or worsening symptoms.

## 2025-03-15 NOTE — ED PROVIDER NOTES
[facility] EMERGENCY DEPARTMENT  EMERGENCY DEPARTMENT ENCOUNTER      Pt Name: Schuyler Philippe  MRN: [id number]  Birthdate 1945  Date of evaluation: 3/15/2025  Provider: Viviana Hayedn DO  2:11 AM    CHIEF COMPLAINT       Chief Complaint   Patient presents with    Cough         HISTORY OF PRESENT ILLNESS    Schuyler Philippe is a 79 y.o. male who presents to the emergency department cough    Patient is presents to the emergency department for cough.  Symptoms began several days ago, [facility].  He states he was not prescribed any medications to help with cough.  He states the cough is making it so he cannot sleep and he is absolutely exhausted.  Cough is productive of scant clear sputum.  He denies chest pain or shortness of breath.  Nothing makes his symptoms better or worse.  He reports limited and intermittent relief with use of albuterol inhaler.  He reports when he starts coughing, he is unable to stop and the coughing will last for several seconds to a minute, resulting in him feeling short winded.  No fever, chills, chest pain, nausea, vomiting, abdominal pain, headache, dizziness or syncope.  History of atrial fibrillation, asbestos exposure, diabetes, hypertension and colon cancer status post colostomy.  He last took chemotherapy May 2024.  He was advised to have stronger chemotherapy which would require port placement and patient declined    The history is provided by the patient and medical records.       Nursing Notes were reviewed.    REVIEW OF SYSTEMS       Review of Systems   Constitutional: Negative.    Respiratory:  Positive for cough and shortness of breath. Negative for chest tightness, wheezing and stridor.    Cardiovascular: Negative.    Gastrointestinal: Negative.    Genitourinary: Negative.    Neurological: Negative.    All other systems reviewed and are negative.      Except as noted above the remainder of the review of  Viviana LAN DO  Authorized by: Viviana Hayden DO    ECG interpreted by ED Physician in the absence of a cardiologist: yes    Previous ECG:     Previous ECG:  Compared to current    Similarity:  Changes noted    Comparison ECG info:  No longer in a flutter  Interpretation:     Interpretation: non-specific    Rate:     ECG rate:  116    ECG rate assessment: tachycardic    Rhythm:     Rhythm: sinus tachycardia    QRS:     QRS axis:  Normal  Comments:      Sinus tachycardia, no acute ST changes      FINAL IMPRESSION      1. Subacute cough    2. Pleural effusion, right          DISPOSITION/PLAN   DISPOSITION Decision To Discharge 03/15/2025 02:08:30 AM   DISPOSITION CONDITION Stable           PATIENT REFERRED TO:  No follow-up provider specified.    DISCHARGE MEDICATIONS:  Current Discharge Medication List        START taking these medications    Details   benzonatate (TESSALON) 100 MG capsule Take 1-2 capsules by mouth 3 times daily as needed for Cough  Qty: 60 capsule, Refills: 0      guaiFENesin-codeine (GUAIFENESIN AC) 100-10 MG/5ML liquid Take 10 mLs by mouth nightly as needed for Cough for up to 5 days. Max Daily Amount: 10 mLs  Qty: 50 mL, Refills: 0    Associated Diagnoses: Subacute cough           Controlled Substances Monitoring:          No data to display                (Please note that portions of this note were completed with a voice recognition program.  Efforts were made to edit the dictations but occasionally words are mis-transcribed.)    Viviana Hayden DO (electronically signed)  Attending Emergency Physician            Viviana Hayden DO  03/15/25 0331

## 2025-03-15 NOTE — ED TRIAGE NOTES
Pt to ER reports that [facility] and [facility]. Reports that he continues to cough, states that he was not given any cough medicine. Reports that his albuterol inhaler gives him temporary relief. States that he is unable to sleep due to coughing

## 2025-03-26 ENCOUNTER — HOSPITAL ENCOUNTER (OUTPATIENT)
Age: 80
Setting detail: OBSERVATION
Discharge: HOME | End: 2025-03-27
Attending: EMERGENCY MEDICINE | Admitting: INTERNAL MEDICINE
Payer: OTHER GOVERNMENT

## 2025-03-26 ENCOUNTER — APPOINTMENT (OUTPATIENT)
Age: 80
End: 2025-03-26
Payer: OTHER GOVERNMENT

## 2025-03-26 DIAGNOSIS — R53.1 GENERALIZED WEAKNESS: ICD-10-CM

## 2025-03-26 DIAGNOSIS — E86.0 MILD DEHYDRATION: ICD-10-CM

## 2025-03-26 DIAGNOSIS — R11.2 NAUSEA AND VOMITING, UNSPECIFIED VOMITING TYPE: Primary | ICD-10-CM

## 2025-03-26 DIAGNOSIS — C78.7 METASTATIC COLON CANCER TO LIVER (HCC): ICD-10-CM

## 2025-03-26 DIAGNOSIS — J90 PLEURAL EFFUSION DUE TO ANOTHER DISORDER: ICD-10-CM

## 2025-03-26 DIAGNOSIS — C18.9 METASTATIC COLON CANCER TO LIVER (HCC): ICD-10-CM

## 2025-03-26 LAB
ALBUMIN SERPL-MCNC: 1.9 G/DL (ref 3.4–5)
ALBUMIN/GLOB SERPL: 0.3 (ref 0.8–1.7)
ALP SERPL-CCNC: 317 U/L (ref 45–117)
ALT SERPL-CCNC: 52 U/L (ref 16–61)
ANION GAP SERPL CALC-SCNC: 9 MMOL/L (ref 3–18)
APPEARANCE UR: CLEAR
AST SERPL W P-5'-P-CCNC: 150 U/L (ref 10–38)
BACTERIA URNS QL MICRO: NEGATIVE /HPF
BASOPHILS # BLD: 0.04 K/UL (ref 0–0.1)
BASOPHILS NFR BLD: 0.4 % (ref 0–2)
BILIRUB SERPL-MCNC: 1.3 MG/DL (ref 0.2–1)
BILIRUB UR QL: ABNORMAL
BUN SERPL-MCNC: 9 MG/DL (ref 7–18)
BUN/CREAT SERPL: 11 (ref 12–20)
CA-I BLD-MCNC: 8.6 MG/DL (ref 8.5–10.1)
CHLORIDE SERPL-SCNC: 99 MMOL/L (ref 100–111)
CO2 SERPL-SCNC: 26 MMOL/L (ref 21–32)
COLOR UR: ABNORMAL
CREAT SERPL-MCNC: 0.81 MG/DL (ref 0.6–1.3)
DIFFERENTIAL METHOD BLD: ABNORMAL
EOSINOPHIL # BLD: 0.15 K/UL (ref 0–0.4)
EOSINOPHIL NFR BLD: 1.4 % (ref 0–5)
EPITH CASTS URNS QL MICRO: NORMAL /LPF (ref 0–20)
ERYTHROCYTE [DISTWIDTH] IN BLOOD BY AUTOMATED COUNT: 23.8 % (ref 11.6–14.5)
FLUAV RNA SPEC QL NAA+PROBE: NOT DETECTED
FLUBV RNA SPEC QL NAA+PROBE: NOT DETECTED
GLOBULIN SER CALC-MCNC: 5.8 G/DL (ref 2–4)
GLUCOSE SERPL-MCNC: 176 MG/DL (ref 74–99)
GLUCOSE UR STRIP.AUTO-MCNC: NEGATIVE MG/DL
HCT VFR BLD AUTO: 30.8 % (ref 36–48)
HGB BLD-MCNC: 9.5 G/DL (ref 13–16)
HGB UR QL STRIP: NEGATIVE
IMM GRANULOCYTES # BLD AUTO: 0.12 K/UL (ref 0–0.04)
IMM GRANULOCYTES NFR BLD AUTO: 1.2 % (ref 0–0.5)
KETONES UR QL STRIP.AUTO: ABNORMAL MG/DL
LEUKOCYTE ESTERASE UR QL STRIP.AUTO: NEGATIVE
LIPASE SERPL-CCNC: 18 U/L (ref 13–75)
LYMPHOCYTES # BLD: 0.83 K/UL (ref 0.9–3.6)
LYMPHOCYTES NFR BLD: 8 % (ref 21–52)
MCH RBC QN AUTO: 26.3 PG (ref 24–34)
MCHC RBC AUTO-ENTMCNC: 30.8 G/DL (ref 31–37)
MCV RBC AUTO: 85.3 FL (ref 78–100)
MONOCYTES # BLD: 1.1 K/UL (ref 0.05–1.2)
MONOCYTES NFR BLD: 10.6 % (ref 3–10)
NEUTS SEG # BLD: 8.16 K/UL (ref 1.8–8)
NEUTS SEG NFR BLD: 78.4 % (ref 40–73)
NITRITE UR QL STRIP.AUTO: NEGATIVE
NRBC # BLD: 0.02 K/UL (ref 0–0.01)
NRBC BLD-RTO: 0.2 PER 100 WBC
PH UR STRIP: 5 (ref 5–8)
PLATELET # BLD AUTO: 463 K/UL (ref 135–420)
PMV BLD AUTO: 10.6 FL (ref 9.2–11.8)
POTASSIUM SERPL-SCNC: 4.3 MMOL/L (ref 3.5–5.5)
PROT SERPL-MCNC: 7.7 G/DL (ref 6.4–8.2)
PROT UR STRIP-MCNC: ABNORMAL MG/DL
RBC # BLD AUTO: 3.61 M/UL (ref 4.35–5.65)
RBC #/AREA URNS HPF: NORMAL /HPF (ref 0–2)
RBC MORPH BLD: ABNORMAL
SARS-COV-2 RNA RESP QL NAA+PROBE: NOT DETECTED
SODIUM SERPL-SCNC: 134 MMOL/L (ref 136–145)
SP GR UR REFRACTOMETRY: 1.02 (ref 1–1.03)
TROPONIN I SERPL HS-MCNC: <3 NG/L (ref 0–78)
UROBILINOGEN UR QL STRIP.AUTO: 0.2 EU/DL (ref 0.2–1)
WBC # BLD AUTO: 10.4 K/UL (ref 4.6–13.2)
WBC URNS QL MICRO: NORMAL /HPF (ref 0–4)

## 2025-03-26 PROCEDURE — 84484 ASSAY OF TROPONIN QUANT: CPT

## 2025-03-26 PROCEDURE — 6370000000 HC RX 637 (ALT 250 FOR IP): Performed by: NURSE PRACTITIONER

## 2025-03-26 PROCEDURE — 87636 SARSCOV2 & INF A&B AMP PRB: CPT

## 2025-03-26 PROCEDURE — 74176 CT ABD & PELVIS W/O CONTRAST: CPT

## 2025-03-26 PROCEDURE — 81001 URINALYSIS AUTO W/SCOPE: CPT

## 2025-03-26 PROCEDURE — 83690 ASSAY OF LIPASE: CPT

## 2025-03-26 PROCEDURE — 93005 ELECTROCARDIOGRAM TRACING: CPT | Performed by: EMERGENCY MEDICINE

## 2025-03-26 PROCEDURE — 85025 COMPLETE CBC W/AUTO DIFF WBC: CPT

## 2025-03-26 PROCEDURE — 6360000002 HC RX W HCPCS: Performed by: EMERGENCY MEDICINE

## 2025-03-26 PROCEDURE — 6370000000 HC RX 637 (ALT 250 FOR IP): Performed by: EMERGENCY MEDICINE

## 2025-03-26 PROCEDURE — 96374 THER/PROPH/DIAG INJ IV PUSH: CPT

## 2025-03-26 PROCEDURE — 2500000003 HC RX 250 WO HCPCS: Performed by: EMERGENCY MEDICINE

## 2025-03-26 PROCEDURE — 2580000003 HC RX 258: Performed by: EMERGENCY MEDICINE

## 2025-03-26 PROCEDURE — 80053 COMPREHEN METABOLIC PANEL: CPT

## 2025-03-26 PROCEDURE — G0378 HOSPITAL OBSERVATION PER HR: HCPCS

## 2025-03-26 PROCEDURE — 71045 X-RAY EXAM CHEST 1 VIEW: CPT

## 2025-03-26 PROCEDURE — 99285 EMERGENCY DEPT VISIT HI MDM: CPT

## 2025-03-26 RX ORDER — DILTIAZEM HYDROCHLORIDE 240 MG/1
240 CAPSULE, COATED, EXTENDED RELEASE ORAL DAILY
Status: DISCONTINUED | OUTPATIENT
Start: 2025-03-27 | End: 2025-03-27 | Stop reason: HOSPADM

## 2025-03-26 RX ORDER — ALBUTEROL SULFATE 90 UG/1
2 INHALANT RESPIRATORY (INHALATION) EVERY 4 HOURS PRN
Status: DISCONTINUED | OUTPATIENT
Start: 2025-03-26 | End: 2025-03-27 | Stop reason: HOSPADM

## 2025-03-26 RX ORDER — DILTIAZEM HYDROCHLORIDE 120 MG/1
120 CAPSULE, COATED, EXTENDED RELEASE ORAL
Status: COMPLETED | OUTPATIENT
Start: 2025-03-26 | End: 2025-03-26

## 2025-03-26 RX ORDER — VITAMIN B COMPLEX
2000 TABLET ORAL DAILY
Status: DISCONTINUED | OUTPATIENT
Start: 2025-03-27 | End: 2025-03-27 | Stop reason: HOSPADM

## 2025-03-26 RX ORDER — ONDANSETRON 2 MG/ML
4 INJECTION INTRAMUSCULAR; INTRAVENOUS ONCE
Status: DISCONTINUED | OUTPATIENT
Start: 2025-03-26 | End: 2025-03-27 | Stop reason: HOSPADM

## 2025-03-26 RX ORDER — ALLOPURINOL 300 MG/1
300 TABLET ORAL DAILY
Status: DISCONTINUED | OUTPATIENT
Start: 2025-03-27 | End: 2025-03-27 | Stop reason: HOSPADM

## 2025-03-26 RX ORDER — 0.9 % SODIUM CHLORIDE 0.9 %
1000 INTRAVENOUS SOLUTION INTRAVENOUS ONCE
Status: DISCONTINUED | OUTPATIENT
Start: 2025-03-26 | End: 2025-03-27 | Stop reason: HOSPADM

## 2025-03-26 RX ORDER — IOPAMIDOL 755 MG/ML
100 INJECTION, SOLUTION INTRAVASCULAR
Status: DISCONTINUED | OUTPATIENT
Start: 2025-03-26 | End: 2025-03-27 | Stop reason: HOSPADM

## 2025-03-26 RX ORDER — DEXTROSE MONOHYDRATE AND SODIUM CHLORIDE 5; .45 G/100ML; G/100ML
INJECTION, SOLUTION INTRAVENOUS CONTINUOUS
Status: DISCONTINUED | OUTPATIENT
Start: 2025-03-26 | End: 2025-03-27

## 2025-03-26 RX ORDER — DILTIAZEM HYDROCHLORIDE 5 MG/ML
5 INJECTION INTRAVENOUS ONCE
Status: COMPLETED | OUTPATIENT
Start: 2025-03-26 | End: 2025-03-26

## 2025-03-26 RX ORDER — ACETAMINOPHEN 325 MG/1
650 TABLET ORAL EVERY 6 HOURS PRN
Status: DISCONTINUED | OUTPATIENT
Start: 2025-03-26 | End: 2025-03-27 | Stop reason: HOSPADM

## 2025-03-26 RX ORDER — FERROUS SULFATE 325(65) MG
325 TABLET ORAL
Status: DISCONTINUED | OUTPATIENT
Start: 2025-03-27 | End: 2025-03-27 | Stop reason: HOSPADM

## 2025-03-26 RX ADMIN — APIXABAN 2.5 MG: 2.5 TABLET, FILM COATED ORAL at 23:44

## 2025-03-26 RX ADMIN — DILTIAZEM HYDROCHLORIDE 120 MG: 120 CAPSULE, EXTENDED RELEASE ORAL at 21:02

## 2025-03-26 RX ADMIN — DILTIAZEM HYDROCHLORIDE 5 MG: 5 INJECTION, SOLUTION INTRAVENOUS at 21:04

## 2025-03-26 ASSESSMENT — PAIN SCALES - GENERAL
PAINLEVEL_OUTOF10: 0
PAINLEVEL_OUTOF10: 5

## 2025-03-26 ASSESSMENT — PAIN - FUNCTIONAL ASSESSMENT: PAIN_FUNCTIONAL_ASSESSMENT: 0-10

## 2025-03-26 ASSESSMENT — PAIN DESCRIPTION - LOCATION: LOCATION: RIB CAGE

## 2025-03-26 ASSESSMENT — PAIN DESCRIPTION - ORIENTATION: ORIENTATION: RIGHT

## 2025-03-26 ASSESSMENT — PAIN DESCRIPTION - DESCRIPTORS: DESCRIPTORS: DULL

## 2025-03-26 NOTE — ED PROVIDER NOTES
[facility] EMERGENCY DEPT  EMERGENCY DEPARTMENT HISTORY AND PHYSICAL EXAM      Date: 3/26/2025  Patient Name: Schuyler Philippe  MRN: [id number]  YOB: 1945  Date of evaluation: 3/26/2025  Provider: Rodolfo Hardy DO   Note Started: 1:59 PM EDT 3/26/25    HISTORY OF PRESENT ILLNESS     Chief Complaint   Patient presents with    Vomiting    Nausea       History was provided by: Patient and Brother    HPI:Patient is a 79-year-old male with a past medical history of metastatic colon cancer, not currently receiving any chemotherapy treatments.  He was reportedly given chemotherapy treatment several years ago when the cancer was originally diagnosed, he underwent colectomy and ended up with an ileostomy.  Patient was told he had cleared of the cancer until few months ago they had noted some metastasis on the liver and that prompted him to be recommended again for chemo treatments however patient has declined chemo treatment so far.  His family member at bedside says that he downplays his symptoms.  Patient is complaining of vomiting and loss of appetite, he has had a cough for several months.  Was seen a few weeks ago had a chest x-ray done which showed a right pleural effusion.          PAST MEDICAL HISTORY   Past Medical History:  Past Medical History:   Diagnosis Date    A-fib (HCC)     Asbestosis (HCC)     Colon cancer (HCC)     Diabetes mellitus (HCC)     Hypertension     Prostate cancer (HCC)        Past Surgical History:  Past Surgical History:   Procedure Laterality Date    APPENDECTOMY      COLECTOMY      with colostomy       Family History:  History reviewed. No pertinent family history.    Social History:  Social History     Tobacco Use    Smoking status: Never    Smokeless tobacco: Never   Vaping Use    Vaping status: Never Used   Substance Use Topics    Alcohol use: Never    Drug use: Never       Allergies:  Allergies   Allergen Reactions    Penicillins Itching    Codeine Dizziness or Vertigo       PCP: None,

## 2025-03-26 NOTE — PROGRESS NOTES
Multiple attempts have been made to get IV access. OR nurse as well as nursing supervisor have attempted. Dr. Antony grullon.

## 2025-03-26 NOTE — ED TRIAGE NOTES
Patient came in via EMS for c/o nausea and vomiting for about a week. He states eating and drinking precipitates the nausea and vomiting. He has hx of colon cancer with right side colostomy bag. C/o pain on the right side above colostomy.

## 2025-03-27 VITALS
SYSTOLIC BLOOD PRESSURE: 103 MMHG | HEART RATE: 85 BPM | OXYGEN SATURATION: 99 % | HEIGHT: 68 IN | RESPIRATION RATE: 18 BRPM | DIASTOLIC BLOOD PRESSURE: 64 MMHG | WEIGHT: 179 LBS | BODY MASS INDEX: 27.13 KG/M2 | TEMPERATURE: 97.7 F

## 2025-03-27 LAB
ANION GAP SERPL CALC-SCNC: 9 MMOL/L (ref 3–18)
BASOPHILS # BLD: 0.05 K/UL (ref 0–0.1)
BASOPHILS NFR BLD: 0.5 % (ref 0–2)
BUN SERPL-MCNC: 8 MG/DL (ref 7–18)
BUN/CREAT SERPL: 10 (ref 12–20)
CA-I BLD-MCNC: 7.2 MG/DL (ref 8.5–10.1)
CHLORIDE SERPL-SCNC: 102 MMOL/L (ref 100–111)
CO2 SERPL-SCNC: 22 MMOL/L (ref 21–32)
CREAT SERPL-MCNC: 0.82 MG/DL (ref 0.6–1.3)
DIFFERENTIAL METHOD BLD: ABNORMAL
EKG ATRIAL RATE: 365 BPM
EKG DIAGNOSIS: NORMAL
EKG Q-T INTERVAL: 294 MS
EKG QRS DURATION: 66 MS
EKG QTC CALCULATION (BAZETT): 401 MS
EKG R AXIS: 5 DEGREES
EKG T AXIS: -21 DEGREES
EKG VENTRICULAR RATE: 112 BPM
EOSINOPHIL # BLD: 0.02 K/UL (ref 0–0.4)
EOSINOPHIL NFR BLD: 0.2 % (ref 0–5)
ERYTHROCYTE [DISTWIDTH] IN BLOOD BY AUTOMATED COUNT: 23.5 % (ref 11.6–14.5)
GLUCOSE BLD STRIP.AUTO-MCNC: 171 MG/DL (ref 70–110)
GLUCOSE SERPL-MCNC: 309 MG/DL (ref 74–99)
HCT VFR BLD AUTO: 24.8 % (ref 36–48)
HGB BLD-MCNC: 7.6 G/DL (ref 13–16)
IMM GRANULOCYTES # BLD AUTO: 0.1 K/UL (ref 0–0.04)
IMM GRANULOCYTES NFR BLD AUTO: 1 % (ref 0–0.5)
LYMPHOCYTES # BLD: 0.83 K/UL (ref 0.9–3.6)
LYMPHOCYTES NFR BLD: 8.4 % (ref 21–52)
MCH RBC QN AUTO: 25.9 PG (ref 24–34)
MCHC RBC AUTO-ENTMCNC: 30.6 G/DL (ref 31–37)
MCV RBC AUTO: 84.6 FL (ref 78–100)
MONOCYTES # BLD: 1.16 K/UL (ref 0.05–1.2)
MONOCYTES NFR BLD: 11.7 % (ref 3–10)
NEUTS SEG # BLD: 7.74 K/UL (ref 1.8–8)
NEUTS SEG NFR BLD: 78.2 % (ref 40–73)
NRBC # BLD: 0.03 K/UL (ref 0–0.01)
NRBC BLD-RTO: 0.3 PER 100 WBC
PERFORMED BY:: ABNORMAL
PLATELET # BLD AUTO: 337 K/UL (ref 135–420)
PMV BLD AUTO: 11.1 FL (ref 9.2–11.8)
POTASSIUM SERPL-SCNC: 4.3 MMOL/L (ref 3.5–5.5)
RBC # BLD AUTO: 2.93 M/UL (ref 4.35–5.65)
RBC MORPH BLD: ABNORMAL
SODIUM SERPL-SCNC: 133 MMOL/L (ref 136–145)
WBC # BLD AUTO: 9.9 K/UL (ref 4.6–13.2)

## 2025-03-27 PROCEDURE — 82962 GLUCOSE BLOOD TEST: CPT

## 2025-03-27 PROCEDURE — 97530 THERAPEUTIC ACTIVITIES: CPT

## 2025-03-27 PROCEDURE — 97162 PT EVAL MOD COMPLEX 30 MIN: CPT

## 2025-03-27 PROCEDURE — 94761 N-INVAS EAR/PLS OXIMETRY MLT: CPT

## 2025-03-27 PROCEDURE — 85025 COMPLETE CBC W/AUTO DIFF WBC: CPT

## 2025-03-27 PROCEDURE — G0378 HOSPITAL OBSERVATION PER HR: HCPCS

## 2025-03-27 PROCEDURE — 80048 BASIC METABOLIC PNL TOTAL CA: CPT

## 2025-03-27 PROCEDURE — 2580000003 HC RX 258: Performed by: NURSE PRACTITIONER

## 2025-03-27 PROCEDURE — 6370000000 HC RX 637 (ALT 250 FOR IP): Performed by: NURSE PRACTITIONER

## 2025-03-27 PROCEDURE — 96361 HYDRATE IV INFUSION ADD-ON: CPT

## 2025-03-27 RX ORDER — DEXTROSE MONOHYDRATE 100 MG/ML
INJECTION, SOLUTION INTRAVENOUS CONTINUOUS PRN
Status: DISCONTINUED | OUTPATIENT
Start: 2025-03-27 | End: 2025-03-27 | Stop reason: HOSPADM

## 2025-03-27 RX ORDER — INSULIN LISPRO 100 [IU]/ML
0-8 INJECTION, SOLUTION INTRAVENOUS; SUBCUTANEOUS
Status: DISCONTINUED | OUTPATIENT
Start: 2025-03-27 | End: 2025-03-27 | Stop reason: HOSPADM

## 2025-03-27 RX ADMIN — DEXTROSE AND SODIUM CHLORIDE: 5; 450 INJECTION, SOLUTION INTRAVENOUS at 00:02

## 2025-03-27 RX ADMIN — EMPAGLIFLOZIN 10 MG: 10 TABLET, FILM COATED ORAL at 09:17

## 2025-03-27 RX ADMIN — Medication 2000 UNITS: at 09:17

## 2025-03-27 RX ADMIN — ALLOPURINOL 300 MG: 300 TABLET ORAL at 09:17

## 2025-03-27 RX ADMIN — APIXABAN 2.5 MG: 2.5 TABLET, FILM COATED ORAL at 09:18

## 2025-03-27 RX ADMIN — ACETAMINOPHEN 650 MG: 325 TABLET ORAL at 09:23

## 2025-03-27 RX ADMIN — DILTIAZEM HYDROCHLORIDE 240 MG: 240 CAPSULE, EXTENDED RELEASE ORAL at 09:18

## 2025-03-27 RX ADMIN — FERROUS SULFATE TAB 325 MG (65 MG ELEMENTAL FE) 325 MG: 325 (65 FE) TAB at 09:17

## 2025-03-27 ASSESSMENT — PAIN DESCRIPTION - LOCATION: LOCATION: FLANK

## 2025-03-27 ASSESSMENT — PAIN SCALES - GENERAL
PAINLEVEL_OUTOF10: 0
PAINLEVEL_OUTOF10: 6
PAINLEVEL_OUTOF10: 0

## 2025-03-27 ASSESSMENT — PAIN DESCRIPTION - ORIENTATION: ORIENTATION: RIGHT

## 2025-03-27 ASSESSMENT — PAIN DESCRIPTION - DESCRIPTORS: DESCRIPTORS: ACHING;SORE;PRESSURE

## 2025-03-27 NOTE — THERAPY EVALUATION
PHYSICAL THERAPY EVALUATION    Patient: Schuyler Philippe (79 y.o. male)  Date: 3/27/2025  Physical Therapy Time:   PT Individual Minutes  Time In: 0837  Time Out: 0902  Minutes: 25  Timed Minute Breakdown:  15 eval 10 T.A.      Primary Diagnosis: Dehydration [E86.0] Dehydration  Present on Admission:   Dehydration        Precautions:   Restrictions/Precautions  Restrictions/Precautions: None  Activity Level: Up with Assist (due to lines)      Assessment: Pt admitted for weakness and dehydration with a hx of colon cancer with mets to the liver and lung. He reports not feeling well but is agreeable to mobility. He is able to ambulate a short distance before returning to the bedside. He then dumps colostomy. He then returns to bed and is left with all needs met.  Performance Deficits/Impairments: Decreased functional mobility ;Decreased endurance;Increased pain  Treatment Diagnosis: difficulty in walking  Therapy Prognosis: Poor  Decision Making: Medium Complexity  Discharge Recommendations: Home with assist PRN;Home with Home health PT    Conditions Requiring skilled therapeutic intervention:  Performance Deficits/Impairments: Decreased functional mobility ;Decreased endurance;Increased pain     Evaluation Activity Tolerance:   Activity Tolerance  Activity Tolerance: Patient limited by fatigue    Equipment Recommendations for Discharge:  PT Equipment Recommendations  Equipment Needed: Yes  Mobility Devices: Walker  Walker: Rolling    AM-PAC  AM-PAC Inpatient Mobility Raw Score : 23; Current research shows that an AM-PAC score of 17 or less is typically not associated with a discharge to the patient's home setting, whereas a score of 18 or greater is typically associated with a discharge to the patient's home setting.    Factors which may influence rehabilitation potential include:  Medical condition/comorbidities  and Home/Living situation and/or support systems   PLAN :  General Plan: 1-2 times a day 3-6 times a

## 2025-03-27 NOTE — ED NOTES
Pt. In a flutter at this time. Pt. Has history of this. Pt. States he has not taken his medication today. MD aware, orders received. .

## 2025-03-27 NOTE — PLAN OF CARE
Problem: Chronic Conditions and Co-morbidities  Goal: Patient's chronic conditions and co-morbidity symptoms are monitored and maintained or improved  Outcome: Progressing  Flowsheets  Taken 3/27/2025 0931 by Josse Coleman RN  Care Plan - Patient's Chronic Conditions and Co-Morbidity Symptoms are Monitored and Maintained or Improved:   Monitor and assess patient's chronic conditions and comorbid symptoms for stability, deterioration, or improvement   Collaborate with multidisciplinary team to address chronic and comorbid conditions and prevent exacerbation or deterioration   Update acute care plan with appropriate goals if chronic or comorbid symptoms are exacerbated and prevent overall improvement and discharge  Taken 3/26/2025 2300 by Dinesh Aviles RN  Care Plan - Patient's Chronic Conditions and Co-Morbidity Symptoms are Monitored and Maintained or Improved:   Monitor and assess patient's chronic conditions and comorbid symptoms for stability, deterioration, or improvement   Collaborate with multidisciplinary team to address chronic and comorbid conditions and prevent exacerbation or deterioration   Update acute care plan with appropriate goals if chronic or comorbid symptoms are exacerbated and prevent overall improvement and discharge     Problem: Discharge Planning  Goal: Discharge to home or other facility with appropriate resources  Outcome: Progressing  Flowsheets  Taken 3/27/2025 0931 by Josse Coleman RN  Discharge to home or other facility with appropriate resources:   Identify barriers to discharge with patient and caregiver   Arrange for needed discharge resources and transportation as appropriate   Identify discharge learning needs (meds, wound care, etc)   Refer to discharge planning if patient needs post-hospital services based on physician order or complex needs related to functional status, cognitive ability or social support system   Arrange for interpreters to assist at discharge as

## 2025-03-27 NOTE — H&P
History and Physical    Subjective:     Schuyler Philippe is a 79 y.o. male with a past medical history significant for HTN.  A-Fib(On Eliquis), DM-II, and colon cancer s/p RLQ colostomy in 2023 who presents to the ED today with complaints of generalized weakness, fatigue, and loss of appetite in the last several days.  Patient stated \"I take a couple bites of sandwich, and I get so dehydrated even when I try to vomit nothing comes out, \" I just do not have appetite for food\", \"I am so weak\". \"I can't walk\" This prompted the ED visit. He stated no vomit or nausea today but has had some intermittent episodes last couple days. No chest pains, sob, fevers, chills, abdominal pains. He has a colostomy bag that was just emptied prior to my arrival. States no problem with his ostomy. In the ED, CT abdomen and pelvis shows no bowel obstruction.  There is enlargement of the right lobe of the liver occupied by metastatic disease with moderate right pleural effusion and progressive pulmonary metastasis. CXR shows 11mm pulmonary nodule with mets and increased and right sided pleural effusion causing RLL atelectasis.   He appears to have lost to follow ups with his oncologist and PCP.   He stated he only had oral chemo regimen \"14 days on 7 days off for 1 year\". \"They offered me hospice but I tell you what\" \"you should never tell that to a patient\", Anyways \"I never had colonoscopy done my whole life so\"   No other complaints voiced.     We agreed to observation admit criteria for debility, dehydration, protein calorie malnutrition cachexia, in progressive metastatic disease. He understands he can only get symptom relief and IV hydration on this admission. He understands he needs to get in touch with his oncologists and PCP at the [facility].       Past Medical History:   Diagnosis Date    A-fib (HCC)     Asbestosis (HCC)     Colon cancer (HCC)     Diabetes mellitus (HCC)     Hypertension     Prostate cancer (HCC)

## 2025-03-27 NOTE — PROGRESS NOTES
0655-Bedside and Verbal shift change report given to TERESA Solomon (oncoming nurse) by TERESA Montiel (offgoing nurse). Report included the following information Nurse Handoff Report, Adult Overview, Intake/Output, Cardiac Rhythm  , Neuro Assessment, and Event Log-Assumed care- White board updated- Safety measures implemented    0830- Physical therapy at pt  bedside    0923-VSS- shift assessment completed- Medications administered per MAR protocol- Prn pain medication administered per pt request for 6/10 flank pain- Pt tolerated well- No pt concerns at this time- Family at bedside    1115- VSS- Pt resting in bed- Family at bedside    1355-Pt discharge instructions and education completed- Opportunity for questions presented- Pt acknowledges understanding- IV removed- Pt tolerated well- Transported pt dwn via wheelchair for discharge [facility]

## 2025-03-27 NOTE — ED NOTES
TRANSFER - OUT REPORT:    Verbal report given to TERESA Montiel on Schuyler Philippe  being transferred to [address] [place] [address] for routine progression of patient care       Report consisted of patient's Situation, Background, Assessment and   Recommendations(SBAR).     Information from the following report(s) ED Encounter Summary, ED SBAR, MAR, Recent Results, and Cardiac Rhythm Afib/Aflutter  was reviewed with the receiving nurse.    [place] Fall Assessment:    Presents to emergency department  because of falls (Syncope, seizure, or loss of consciousness): No  Age > 70: Yes  Altered Mental Status, Intoxication with alcohol or substance confusion (Disorientation, impaired judgment, poor safety awaremess, or inability to follow instructions): No  Impaired Mobility: Ambulates or transfers with assistive devices or assistance; Unable to ambulate or transer.: No  Nursing Judgement: No          Lines:   Peripheral IV 03/26/25 Left External Jugular (Active)        Opportunity for questions and clarification was provided.      Patient transported with:  Monitor and Tech

## 2025-03-27 NOTE — CARE COORDINATION
03/27/25 1054   Service Assessment   Patient Orientation Alert and Oriented   Cognition Alert   History Provided By Patient   Primary Caregiver Self   Support Systems Family Members   Patient's Healthcare Decision Maker is: Legal Next of Kin   PCP Verified by CM Yes  (Dr. Ashraf at [facility])   Last Visit to PCP Within last year   Prior Functional Level Independent in ADLs/IADLs   Current Functional Level Independent in ADLs/IADLs   Can patient return to prior living arrangement Yes   Ability to make needs known: Good   Family able to assist with home care needs: Yes   Would you like for me to discuss the discharge plan with any other family members/significant others, and if so, who? No   Financial Resources [place] [facility]   Community Resources None   CM/SW Referral ADLs/IADLs;Disease Management Education;DME   Social/Functional History   Lives With Alone   Type of Home House   Home Layout One level   Home Equipment Cane     Pt will be discharging today.  PT recommends home health and walker.  Brother is at bedside and can provide transportation home.  Contacted [facility] regarding HH services and 4 wheeled walker.  Waiting on response.    ABILIO ALFARO RN[address] [place] [address]  PH; [phone]

## 2025-03-27 NOTE — DISCHARGE SUMMARY
Discharge Summary       PATIENT ID: Schuyler Philippe  MRN: [id number]   YOB: 1945    DATE OF ADMISSION: 3/26/2025 11:55 AM    DATE OF DISCHARGE: 03/27/25    PRIMARY CARE PROVIDER: None, None     ATTENDING PHYSICIAN: Wilfred Dan MD  DISCHARGING PROVIDER: Wilfred Dan MD        CONSULTATIONS: IP CONSULT TO CASE MANAGEMENT    PROCEDURES/SURGERIES: * No surgery found *    ADMITTING DIAGNOSES & HOSPITAL COURSE:   Schuyler Philippe is a 79 y.o. male with a past medical history significant for HTN.  A-Fib(On Eliquis), DM-II, and colon cancer s/p RLQ colostomy in 2023 who presents to the ED today with complaints of generalized weakness, fatigue, and loss of appetite in the last several days.  Patient stated \"I take a couple bites of sandwich, and I get so dehydrated even when I try to vomit nothing comes out, \" I just do not have appetite for food\", \"I am so weak\". \"I can't walk\" This prompted the ED visit. He stated no vomit or nausea today but has had some intermittent episodes last couple days. No chest pains, sob, fevers, chills, abdominal pains. He has a colostomy bag that was just emptied prior to my arrival. States no problem with his ostomy. In the ED, CT abdomen and pelvis shows no bowel obstruction.  There is enlargement of the right lobe of the liver occupied by metastatic disease with moderate right pleural effusion and progressive pulmonary metastasis. CXR shows 11mm pulmonary nodule with mets and increased and right sided pleural effusion causing RLL atelectasis.   He appears to have lost to follow ups with his oncologist and PCP.   He stated he only had oral chemo regimen \"14 days on 7 days off for 1 year\". \"They offered me hospice but I tell you what\" \"you should never tell that to a patient\", Anyways \"I never had colonoscopy done my whole life so\"   No other complaints voiced.      We agreed to observation admit criteria for debility, dehydration, protein calorie malnutrition cachexia, in

## 2025-03-27 NOTE — ACP (ADVANCE CARE PLANNING)
Pt with recurrant malignancy.   Discussed goals of care, he is failing to thrive and [facility]  He understands poor prognosis but wants full code, incluiding cpr and intubation, he wants his brother to be his decision maker if he cannot make his own decisions.  Total time 17 mins

## 2025-03-27 NOTE — PROGRESS NOTES
Spiritual Health History and Assessment/Progress Note  [facility]    (P) Initial Encounter,  ,  ,      Name: Schuyler Philippe MRN: [id number]    Age: 79 y.o.     Sex: male   Language: English   Rastafarian: Other   Dehydration     Date: 3/27/2025            Total Time Calculated: (P) 20 min              Spiritual Assessment began in [address]        Referral/Consult From: (P) Rounding   Encounter Overview/Reason: (P) Initial Encounter  Service Provided For: (P) Patient    Prabha, Belief, Meaning:   Patient identifies as spiritual, is connected with a prabha tradition or spiritual practice, and has beliefs or practices that help with coping during difficult times  Family/Friends No family/friends present      Importance and Influence:  Patient has spiritual/personal beliefs that influence decisions regarding their health  Family/Friends No family/friends present    Community:  Patient is connected with a spiritual community  Family/Friends No family/friends present    Assessment and Plan of Care:     Patient Interventions include: Facilitated expression of thoughts and feelings, Explored spiritual coping/struggle/distress, Engaged in theological reflection, Affirmed coping skills/support systems, Provided sacramental/Scientologist ritual, Facilitated life review and/ or legacy, and Assisted in Advance Care Planning conversation  Family/Friends Interventions include: No family/friends present    Patient Plan of Care: Spiritual Care available upon further referral  Family/Friends Plan of Care: No family/friends present    Electronically signed by Chaplain Dionne on 3/27/2025 at 1:46 PM

## 2025-04-08 ENCOUNTER — APPOINTMENT (OUTPATIENT)
Age: 80
End: 2025-04-08
Payer: OTHER GOVERNMENT

## 2025-04-08 ENCOUNTER — HOSPITAL ENCOUNTER (INPATIENT)
Age: 80
LOS: 3 days | Discharge: HOME | End: 2025-04-11
Attending: EMERGENCY MEDICINE | Admitting: INTERNAL MEDICINE
Payer: OTHER GOVERNMENT

## 2025-04-08 DIAGNOSIS — A41.9 SEPSIS, DUE TO UNSPECIFIED ORGANISM, UNSPECIFIED WHETHER ACUTE ORGAN DYSFUNCTION PRESENT (HCC): Primary | ICD-10-CM

## 2025-04-08 DIAGNOSIS — I48.11 LONGSTANDING PERSISTENT ATRIAL FIBRILLATION (HCC): ICD-10-CM

## 2025-04-08 DIAGNOSIS — C78.7 METASTATIC CANCER TO LIVER (HCC): ICD-10-CM

## 2025-04-08 PROBLEM — K21.9 GASTROESOPHAGEAL REFLUX DISEASE: Status: ACTIVE | Noted: 2025-04-08

## 2025-04-08 PROBLEM — J40 BRONCHITIS, NOT SPECIFIED AS ACUTE OR CHRONIC: Status: ACTIVE | Noted: 2025-04-08

## 2025-04-08 PROBLEM — C18.9 MALIGNANT NEOPLASM OF COLON, UNSPECIFIED: Status: ACTIVE | Noted: 2025-04-08

## 2025-04-08 PROBLEM — Z93.2 ILEOSTOMY STATUS (HCC): Status: ACTIVE | Noted: 2025-04-08

## 2025-04-08 PROBLEM — Z51.81 ENCOUNTER FOR THERAPEUTIC DRUG LEVEL MONITORING: Status: ACTIVE | Noted: 2025-04-08

## 2025-04-08 LAB
ALBUMIN SERPL-MCNC: 1.6 G/DL (ref 3.4–5)
ALBUMIN/GLOB SERPL: 0.3 (ref 0.8–1.7)
ALP SERPL-CCNC: 307 U/L (ref 45–117)
ALT SERPL-CCNC: 52 U/L (ref 16–61)
ANION GAP SERPL CALC-SCNC: 9 MMOL/L (ref 3–18)
AST SERPL W P-5'-P-CCNC: 149 U/L (ref 10–38)
BASOPHILS # BLD: 0 K/UL (ref 0–0.1)
BASOPHILS NFR BLD: 0 % (ref 0–2)
BILIRUB SERPL-MCNC: 1.7 MG/DL (ref 0.2–1)
BUN SERPL-MCNC: 9 MG/DL (ref 7–18)
BUN/CREAT SERPL: 11 (ref 12–20)
CA-I BLD-MCNC: 8.5 MG/DL (ref 8.5–10.1)
CHLORIDE SERPL-SCNC: 99 MMOL/L (ref 100–111)
CO2 SERPL-SCNC: 23 MMOL/L (ref 21–32)
CREAT SERPL-MCNC: 0.84 MG/DL (ref 0.6–1.3)
DIFFERENTIAL METHOD BLD: ABNORMAL
EKG DIAGNOSIS: NORMAL
EKG Q-T INTERVAL: 278 MS
EKG QRS DURATION: 66 MS
EKG QTC CALCULATION (BAZETT): 404 MS
EKG R AXIS: 8 DEGREES
EKG T AXIS: 221 DEGREES
EKG VENTRICULAR RATE: 127 BPM
EOSINOPHIL # BLD: 0 K/UL (ref 0–0.4)
EOSINOPHIL NFR BLD: 0 % (ref 0–5)
ERYTHROCYTE [DISTWIDTH] IN BLOOD BY AUTOMATED COUNT: 26.5 % (ref 11.6–14.5)
GLOBULIN SER CALC-MCNC: 5.1 G/DL (ref 2–4)
GLUCOSE BLD STRIP.AUTO-MCNC: 136 MG/DL (ref 70–110)
GLUCOSE SERPL-MCNC: 231 MG/DL (ref 74–99)
HCT VFR BLD AUTO: 28.2 % (ref 36–48)
HGB BLD-MCNC: 8.8 G/DL (ref 13–16)
IMM GRANULOCYTES # BLD AUTO: 0 K/UL
IMM GRANULOCYTES NFR BLD AUTO: 0 %
LACTATE SERPL-SCNC: 3.9 MMOL/L (ref 0.4–2)
LACTATE SERPL-SCNC: 4.2 MMOL/L (ref 0.4–2)
LACTATE SERPL-SCNC: 4.8 MMOL/L (ref 0.4–2)
LYMPHOCYTES # BLD: 0.6 K/UL (ref 0.9–3.6)
LYMPHOCYTES NFR BLD: 4 % (ref 21–52)
MAGNESIUM SERPL-MCNC: 1.9 MG/DL (ref 1.6–2.6)
MCH RBC QN AUTO: 26.3 PG (ref 24–34)
MCHC RBC AUTO-ENTMCNC: 31.2 G/DL (ref 31–37)
MCV RBC AUTO: 84.2 FL (ref 78–100)
MONOCYTES # BLD: 1.64 K/UL (ref 0.05–1.2)
MONOCYTES NFR BLD: 11 % (ref 3–10)
NEUTS BAND NFR BLD MANUAL: 1 % (ref 0–5)
NEUTS SEG # BLD: 12.66 K/UL (ref 1.8–8)
NEUTS SEG NFR BLD: 84 % (ref 40–73)
NRBC # BLD: 0.03 K/UL (ref 0–0.01)
NRBC BLD-RTO: 0.2 PER 100 WBC
PERFORMED BY:: ABNORMAL
PLATELET # BLD AUTO: 379 K/UL (ref 135–420)
PMV BLD AUTO: 10.2 FL (ref 9.2–11.8)
POTASSIUM SERPL-SCNC: 4.3 MMOL/L (ref 3.5–5.5)
PROT SERPL-MCNC: 6.7 G/DL (ref 6.4–8.2)
RBC # BLD AUTO: 3.35 M/UL (ref 4.35–5.65)
RBC MORPH BLD: ABNORMAL
SODIUM SERPL-SCNC: 131 MMOL/L (ref 136–145)
TROPONIN I SERPL HS-MCNC: 3 NG/L (ref 0–78)
TSH SERPL DL<=0.05 MIU/L-ACNC: 2.1 UIU/ML (ref 0.36–3.74)
WBC # BLD AUTO: 14.9 K/UL (ref 4.6–13.2)

## 2025-04-08 PROCEDURE — 93005 ELECTROCARDIOGRAM TRACING: CPT | Performed by: EMERGENCY MEDICINE

## 2025-04-08 PROCEDURE — 2500000003 HC RX 250 WO HCPCS: Performed by: EMERGENCY MEDICINE

## 2025-04-08 PROCEDURE — 87040 BLOOD CULTURE FOR BACTERIA: CPT

## 2025-04-08 PROCEDURE — 83605 ASSAY OF LACTIC ACID: CPT

## 2025-04-08 PROCEDURE — 74176 CT ABD & PELVIS W/O CONTRAST: CPT

## 2025-04-08 PROCEDURE — 96365 THER/PROPH/DIAG IV INF INIT: CPT

## 2025-04-08 PROCEDURE — 80053 COMPREHEN METABOLIC PANEL: CPT

## 2025-04-08 PROCEDURE — 2500000003 HC RX 250 WO HCPCS: Performed by: NURSE PRACTITIONER

## 2025-04-08 PROCEDURE — 84443 ASSAY THYROID STIM HORMONE: CPT

## 2025-04-08 PROCEDURE — 83735 ASSAY OF MAGNESIUM: CPT

## 2025-04-08 PROCEDURE — 6370000000 HC RX 637 (ALT 250 FOR IP): Performed by: NURSE PRACTITIONER

## 2025-04-08 PROCEDURE — 71045 X-RAY EXAM CHEST 1 VIEW: CPT

## 2025-04-08 PROCEDURE — 1100000000 HC RM PRIVATE

## 2025-04-08 PROCEDURE — 85025 COMPLETE CBC W/AUTO DIFF WBC: CPT

## 2025-04-08 PROCEDURE — 6360000002 HC RX W HCPCS: Performed by: EMERGENCY MEDICINE

## 2025-04-08 PROCEDURE — 82962 GLUCOSE BLOOD TEST: CPT

## 2025-04-08 PROCEDURE — 84484 ASSAY OF TROPONIN QUANT: CPT

## 2025-04-08 PROCEDURE — 2580000003 HC RX 258: Performed by: EMERGENCY MEDICINE

## 2025-04-08 PROCEDURE — 99285 EMERGENCY DEPT VISIT HI MDM: CPT

## 2025-04-08 PROCEDURE — 96375 TX/PRO/DX INJ NEW DRUG ADDON: CPT

## 2025-04-08 PROCEDURE — 6370000000 HC RX 637 (ALT 250 FOR IP): Performed by: EMERGENCY MEDICINE

## 2025-04-08 PROCEDURE — 2580000003 HC RX 258: Performed by: NURSE PRACTITIONER

## 2025-04-08 RX ORDER — ALBUTEROL SULFATE 90 UG/1
2 INHALANT RESPIRATORY (INHALATION) EVERY 4 HOURS PRN
Status: DISCONTINUED | OUTPATIENT
Start: 2025-04-08 | End: 2025-04-08 | Stop reason: CLARIF

## 2025-04-08 RX ORDER — ALBUTEROL SULFATE 0.83 MG/ML
2.5 SOLUTION RESPIRATORY (INHALATION) EVERY 4 HOURS PRN
Status: DISCONTINUED | OUTPATIENT
Start: 2025-04-08 | End: 2025-04-11 | Stop reason: HOSPADM

## 2025-04-08 RX ORDER — DILTIAZEM HYDROCHLORIDE 120 MG/1
240 CAPSULE, COATED, EXTENDED RELEASE ORAL DAILY
Status: DISCONTINUED | OUTPATIENT
Start: 2025-04-09 | End: 2025-04-11 | Stop reason: HOSPADM

## 2025-04-08 RX ORDER — SODIUM CHLORIDE 9 MG/ML
INJECTION, SOLUTION INTRAVENOUS CONTINUOUS
Status: DISCONTINUED | OUTPATIENT
Start: 2025-04-08 | End: 2025-04-11 | Stop reason: HOSPADM

## 2025-04-08 RX ORDER — VANCOMYCIN 1.25 G/250ML
1750 INJECTION, SOLUTION INTRAVENOUS ONCE
Status: DISCONTINUED | OUTPATIENT
Start: 2025-04-08 | End: 2025-04-09

## 2025-04-08 RX ORDER — VITAMIN B COMPLEX
2000 TABLET ORAL DAILY
Status: DISCONTINUED | OUTPATIENT
Start: 2025-04-09 | End: 2025-04-11 | Stop reason: HOSPADM

## 2025-04-08 RX ORDER — ALLOPURINOL 300 MG/1
300 TABLET ORAL DAILY
Status: DISCONTINUED | OUTPATIENT
Start: 2025-04-09 | End: 2025-04-11 | Stop reason: HOSPADM

## 2025-04-08 RX ORDER — FERROUS SULFATE 325(65) MG
325 TABLET ORAL
Status: DISCONTINUED | OUTPATIENT
Start: 2025-04-09 | End: 2025-04-11 | Stop reason: HOSPADM

## 2025-04-08 RX ORDER — 0.9 % SODIUM CHLORIDE 0.9 %
30 INTRAVENOUS SOLUTION INTRAVENOUS ONCE
Status: COMPLETED | OUTPATIENT
Start: 2025-04-08 | End: 2025-04-08

## 2025-04-08 RX ORDER — PANTOPRAZOLE SODIUM 40 MG/1
40 TABLET, DELAYED RELEASE ORAL
Status: DISCONTINUED | OUTPATIENT
Start: 2025-04-09 | End: 2025-04-11 | Stop reason: HOSPADM

## 2025-04-08 RX ORDER — ACETAMINOPHEN 325 MG/1
650 TABLET ORAL EVERY 4 HOURS PRN
Status: DISCONTINUED | OUTPATIENT
Start: 2025-04-08 | End: 2025-04-11 | Stop reason: HOSPADM

## 2025-04-08 RX ORDER — METOPROLOL TARTRATE 1 MG/ML
5 INJECTION, SOLUTION INTRAVENOUS ONCE
Status: COMPLETED | OUTPATIENT
Start: 2025-04-08 | End: 2025-04-08

## 2025-04-08 RX ORDER — BENZONATATE 100 MG/1
100 CAPSULE ORAL
Status: COMPLETED | OUTPATIENT
Start: 2025-04-08 | End: 2025-04-08

## 2025-04-08 RX ORDER — DEXTROSE MONOHYDRATE 100 MG/ML
INJECTION, SOLUTION INTRAVENOUS CONTINUOUS PRN
Status: DISCONTINUED | OUTPATIENT
Start: 2025-04-08 | End: 2025-04-11 | Stop reason: HOSPADM

## 2025-04-08 RX ORDER — INSULIN LISPRO 100 [IU]/ML
0-4 INJECTION, SOLUTION INTRAVENOUS; SUBCUTANEOUS
Status: DISCONTINUED | OUTPATIENT
Start: 2025-04-08 | End: 2025-04-11 | Stop reason: HOSPADM

## 2025-04-08 RX ORDER — METOPROLOL TARTRATE 25 MG/1
25 TABLET, FILM COATED ORAL 2 TIMES DAILY
Status: DISCONTINUED | OUTPATIENT
Start: 2025-04-08 | End: 2025-04-09

## 2025-04-08 RX ADMIN — BENZONATATE 100 MG: 100 CAPSULE ORAL at 17:08

## 2025-04-08 RX ADMIN — SODIUM CHLORIDE 2052 ML: 0.9 INJECTION, SOLUTION INTRAVENOUS at 17:11

## 2025-04-08 RX ADMIN — WATER 1000 MG: 1 INJECTION INTRAMUSCULAR; INTRAVENOUS; SUBCUTANEOUS at 17:34

## 2025-04-08 RX ADMIN — AZITHROMYCIN MONOHYDRATE 500 MG: 500 INJECTION, POWDER, LYOPHILIZED, FOR SOLUTION INTRAVENOUS at 18:09

## 2025-04-08 RX ADMIN — SODIUM CHLORIDE: 0.9 INJECTION, SOLUTION INTRAVENOUS at 21:26

## 2025-04-08 RX ADMIN — APIXABAN 2.5 MG: 2.5 TABLET, FILM COATED ORAL at 21:29

## 2025-04-08 RX ADMIN — METOPROLOL TARTRATE 5 MG: 5 INJECTION INTRAVENOUS at 21:23

## 2025-04-08 RX ADMIN — WATER 1000 MG: 1 INJECTION INTRAMUSCULAR; INTRAVENOUS; SUBCUTANEOUS at 17:27

## 2025-04-08 ASSESSMENT — PAIN - FUNCTIONAL ASSESSMENT: PAIN_FUNCTIONAL_ASSESSMENT: NONE - DENIES PAIN

## 2025-04-08 NOTE — ED NOTES
Called to attempt IV insertion/blood draw on Code Sepsis, unable to access anything.  MD notified of unsuccessful attempt.  Lab in to attempt blood draw at this time.

## 2025-04-08 NOTE — ED PROVIDER NOTES
EMERGENCY DEPARTMENT HISTORY AND PHYSICAL EXAM    Date: 4/8/2025  Patient Name: Schuyler Philippe    History of Presenting Illness     Chief Complaint   Patient presents with    Cough         History Provided By: Patient    Additional History (Context):   3:04 PM EDT  Schuyler Philippe is a 79 y.o. 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  Patient with chronic diagnoses of [facility] however not getting any current treatment chemotherapy.  He is seeing a new oncologist to he tells me he wants to start everything over from scratch although he has had a number of different CAT scans and PET scan so currently he is not getting chemotherapy radiation or planned surgical intervention.  He is currently compliant on his medication for A-fib as well as Xarelto for stroke prevention.  He had a welfare phone call and they can hear him coughing and having difficulty breathing so he was encouraged to go to the hospital and the friends on the phone called 911 to pick him up and bring him to the hospital for evaluation.  He has had some suggestive chills although no documented fevers.  He is bringing up a thick green sputum.  He denies exposures to other sick or ill.    Social History  No smoking drinking or drug    Family History  No particular family history      PCP: None, None    No current facility-administered medications for this encounter.     Current Outpatient Medications   Medication Sig Dispense Refill    albuterol sulfate HFA (PROVENTIL;VENTOLIN;PROAIR) 108 (90 Base) MCG/ACT inhaler Inhale 2 puffs into the lungs every 4 hours as needed      vitamin D 50 MCG (2000 UT) CAPS capsule Take 1 capsule by mouth daily       patient to be included in the SEP-1 core measure? Yes SEP-1 CORE MEASURE DATA      Sepsis Criteria   Severe Sepsis Criteria   Septic Shock Criteria       Must meet 2:    []Temp >100.9 F (38.3 C) or < 96.8 F (36 C)  [x]HR > 90  [x]RR > 20  []WBC > 12 or < 4 or 10% bands    AND:    [x] Infection Confirmed or Suspected.     Must meet 1:    [x]Lactate > 2       or   []Signs of Organ Dysfunction:    - SBP < 90 or MAP < 65  -Creatinine > 2 or increased from baseline  -Urine Output < 0.5 ml/kg/hr  -Bilirubin > 2  -INR > 1.5 (not anticoagulated)  -Platelets < 100,000  -Acute Respiratory Failure as evidenced by new need for NIPPV or mechanical ventilation   Must meet 1:    []Lactate > 4        or   []SBP < 90 or MAP < 65 for at least two readings in the first hour after fluid bolus administration    []Vasopressors initiated (if hypotension persists after fluid resuscitation)   No data found.   Recent Labs     04/09/25  0530 04/10/25  0415 04/11/25  0500   WBC 16.1* 13.2 13.8*   CREATININE 0.55* 0.59* 0.59*    348 390        Infection (sepsis)  related to likely pulmonary infection (fill in source of infection) likely pulmonary infection identified date: April 8, 2025 time: 4:12 PM    Fluid Resuscitation Rationale: at least 30mL/kg based on ideal body weight.  His lactate level: improving    Reassessment Exam: I have reassessed tissue perfusion and hemodynamic status after fluid bolus at this date/time: 6:15 PM    He had marked delays in IV fluid resuscitation due to the fact we could not get a decent IV access.  Full 30 cc/kg bolus and just over 2 L 2.052 L were ordered in addition to IV fluids associated with Rocephin and Zithromax.      CONSULT NOTE:   8:06 PM  Grey Coe MD   spoke with NP Kofi Oba, APRN  Specialty: Hospitalist  Discussed pt's hx, disposition, and available diagnostic and imaging results  over the telephone. Reviewed care plans. Consulting physician agrees with plans as outlined.  Agrees with

## 2025-04-08 NOTE — ED NOTES
Pt colostomy was emptied of approximately 300 mL of liquid stool and the bag was bloated with air as well

## 2025-04-09 LAB
ANION GAP SERPL CALC-SCNC: 8 MMOL/L (ref 3–18)
BASOPHILS # BLD: 0 K/UL (ref 0–0.1)
BASOPHILS NFR BLD: 0 % (ref 0–2)
BUN SERPL-MCNC: 7 MG/DL (ref 7–18)
BUN/CREAT SERPL: 13 (ref 12–20)
CA-I BLD-MCNC: 7.9 MG/DL (ref 8.5–10.1)
CHLORIDE SERPL-SCNC: 104 MMOL/L (ref 100–111)
CO2 SERPL-SCNC: 25 MMOL/L (ref 21–32)
CREAT SERPL-MCNC: 0.55 MG/DL (ref 0.6–1.3)
DIFFERENTIAL METHOD BLD: ABNORMAL
EOSINOPHIL # BLD: 0 K/UL (ref 0–0.4)
EOSINOPHIL NFR BLD: 0 % (ref 0–5)
ERYTHROCYTE [DISTWIDTH] IN BLOOD BY AUTOMATED COUNT: 26.2 % (ref 11.6–14.5)
GLUCOSE BLD STRIP.AUTO-MCNC: 134 MG/DL (ref 70–110)
GLUCOSE BLD STRIP.AUTO-MCNC: 167 MG/DL (ref 70–110)
GLUCOSE BLD STRIP.AUTO-MCNC: 180 MG/DL (ref 70–110)
GLUCOSE BLD STRIP.AUTO-MCNC: 210 MG/DL (ref 70–110)
GLUCOSE SERPL-MCNC: 187 MG/DL (ref 74–99)
HCT VFR BLD AUTO: 27.1 % (ref 36–48)
HGB BLD-MCNC: 8.4 G/DL (ref 13–16)
IMM GRANULOCYTES # BLD AUTO: 0 K/UL
IMM GRANULOCYTES NFR BLD AUTO: 0 %
LACTATE SERPL-SCNC: 2.5 MMOL/L (ref 0.4–2)
LACTATE SERPL-SCNC: 3 MMOL/L (ref 0.4–2)
LYMPHOCYTES # BLD: 1.45 K/UL (ref 0.9–3.6)
LYMPHOCYTES NFR BLD: 9 % (ref 21–52)
MCH RBC QN AUTO: 26.7 PG (ref 24–34)
MCHC RBC AUTO-ENTMCNC: 31 G/DL (ref 31–37)
MCV RBC AUTO: 86 FL (ref 78–100)
MONOCYTES # BLD: 0.81 K/UL (ref 0.05–1.2)
MONOCYTES NFR BLD: 5 % (ref 3–10)
NEUTS SEG # BLD: 13.84 K/UL (ref 1.8–8)
NEUTS SEG NFR BLD: 86 % (ref 40–73)
NRBC # BLD: 0.02 K/UL (ref 0–0.01)
NRBC BLD-RTO: 0.1 PER 100 WBC
PERFORMED BY:: ABNORMAL
PLATELET # BLD AUTO: 343 K/UL (ref 135–420)
PMV BLD AUTO: 10.2 FL (ref 9.2–11.8)
POTASSIUM SERPL-SCNC: 4 MMOL/L (ref 3.5–5.5)
PROCALCITONIN SERPL-MCNC: 2.84 NG/ML
RBC # BLD AUTO: 3.15 M/UL (ref 4.35–5.65)
RBC MORPH BLD: ABNORMAL
SODIUM SERPL-SCNC: 137 MMOL/L (ref 136–145)
WBC # BLD AUTO: 16.1 K/UL (ref 4.6–13.2)

## 2025-04-09 PROCEDURE — 6370000000 HC RX 637 (ALT 250 FOR IP): Performed by: INTERNAL MEDICINE

## 2025-04-09 PROCEDURE — 6360000002 HC RX W HCPCS: Performed by: INTERNAL MEDICINE

## 2025-04-09 PROCEDURE — 2580000003 HC RX 258: Performed by: NURSE PRACTITIONER

## 2025-04-09 PROCEDURE — 6360000002 HC RX W HCPCS: Performed by: NURSE PRACTITIONER

## 2025-04-09 PROCEDURE — 80048 BASIC METABOLIC PNL TOTAL CA: CPT

## 2025-04-09 PROCEDURE — 36415 COLL VENOUS BLD VENIPUNCTURE: CPT

## 2025-04-09 PROCEDURE — 2580000003 HC RX 258: Performed by: INTERNAL MEDICINE

## 2025-04-09 PROCEDURE — 6370000000 HC RX 637 (ALT 250 FOR IP): Performed by: NURSE PRACTITIONER

## 2025-04-09 PROCEDURE — 85025 COMPLETE CBC W/AUTO DIFF WBC: CPT

## 2025-04-09 PROCEDURE — 83605 ASSAY OF LACTIC ACID: CPT

## 2025-04-09 PROCEDURE — 82962 GLUCOSE BLOOD TEST: CPT

## 2025-04-09 PROCEDURE — 84145 PROCALCITONIN (PCT): CPT

## 2025-04-09 PROCEDURE — 1100000000 HC RM PRIVATE

## 2025-04-09 RX ORDER — METOPROLOL TARTRATE 25 MG/1
25 TABLET, FILM COATED ORAL 2 TIMES DAILY
Status: DISCONTINUED | OUTPATIENT
Start: 2025-04-09 | End: 2025-04-11 | Stop reason: HOSPADM

## 2025-04-09 RX ORDER — METOPROLOL TARTRATE 50 MG
50 TABLET ORAL 2 TIMES DAILY
Status: DISCONTINUED | OUTPATIENT
Start: 2025-04-09 | End: 2025-04-09

## 2025-04-09 RX ADMIN — METOPROLOL TARTRATE 25 MG: 25 TABLET, FILM COATED ORAL at 09:03

## 2025-04-09 RX ADMIN — SODIUM CHLORIDE: 0.9 INJECTION, SOLUTION INTRAVENOUS at 08:00

## 2025-04-09 RX ADMIN — Medication 2000 UNITS: at 09:03

## 2025-04-09 RX ADMIN — APIXABAN 2.5 MG: 2.5 TABLET, FILM COATED ORAL at 21:07

## 2025-04-09 RX ADMIN — CEFEPIME 1000 MG: 1 INJECTION, POWDER, FOR SOLUTION INTRAMUSCULAR; INTRAVENOUS at 09:10

## 2025-04-09 RX ADMIN — SODIUM CHLORIDE: 0.9 INJECTION, SOLUTION INTRAVENOUS at 18:08

## 2025-04-09 RX ADMIN — DILTIAZEM HYDROCHLORIDE 240 MG: 120 CAPSULE, EXTENDED RELEASE ORAL at 09:03

## 2025-04-09 RX ADMIN — AZITHROMYCIN MONOHYDRATE 500 MG: 500 INJECTION, POWDER, LYOPHILIZED, FOR SOLUTION INTRAVENOUS at 18:15

## 2025-04-09 RX ADMIN — INSULIN LISPRO 1 UNITS: 100 INJECTION, SOLUTION INTRAVENOUS; SUBCUTANEOUS at 16:30

## 2025-04-09 RX ADMIN — PANTOPRAZOLE SODIUM 40 MG: 40 TABLET, DELAYED RELEASE ORAL at 06:19

## 2025-04-09 RX ADMIN — FERROUS SULFATE TAB 325 MG (65 MG ELEMENTAL FE) 325 MG: 325 (65 FE) TAB at 08:05

## 2025-04-09 RX ADMIN — CEFEPIME 1000 MG: 1 INJECTION, POWDER, FOR SOLUTION INTRAMUSCULAR; INTRAVENOUS at 21:15

## 2025-04-09 RX ADMIN — INSULIN LISPRO 1 UNITS: 100 INJECTION, SOLUTION INTRAVENOUS; SUBCUTANEOUS at 11:11

## 2025-04-09 RX ADMIN — APIXABAN 2.5 MG: 2.5 TABLET, FILM COATED ORAL at 09:03

## 2025-04-09 RX ADMIN — ALLOPURINOL 300 MG: 300 TABLET ORAL at 09:03

## 2025-04-09 RX ADMIN — METOPROLOL TARTRATE 25 MG: 25 TABLET, FILM COATED ORAL at 21:07

## 2025-04-09 ASSESSMENT — PAIN SCALES - GENERAL: PAINLEVEL_OUTOF10: 0

## 2025-04-09 NOTE — PROGRESS NOTES
0645-Bedside and Verbal shift change report given to TERESA Solomon (oncoming nurse) by TERESA Cabrales (offgoing nurse). Report included the following information Nurse Handoff Report, ED Encounter Summary, ED SBAR, Adult Overview, MAR, Recent Results, Cardiac Rhythm  , Neuro Assessment, and Event Log- Assumed care    Whiteboard updated- VS obtained- Shift assessment completed- Safety measures implemented- Pt engaged in conversation- No signs of distress noted at this time    Medications administered per MAR protocol- Pt tolerated well- Pt expresses no concerns at this time    Pt states that he has spoken with Oncology and everything has been set in place    Bedside and Verbal shift change report given to TERESA Lizarraga (oncoming nurse) by TERESA Solomon (offgoing nurse). Report included the following information Nurse Handoff Report, Adult Overview, Recent Results, Cardiac Rhythm  , Neuro Assessment, and Event Log.

## 2025-04-09 NOTE — PLAN OF CARE
Problem: Chronic Conditions and Co-morbidities  Goal: Patient's chronic conditions and co-morbidity symptoms are monitored and maintained or improved  4/9/2025 0817 by Josse Coleman RN  Outcome: Progressing  4/9/2025 0038 by Samra Coy RN  Outcome: Progressing  Flowsheets (Taken 4/9/2025 0038)  Care Plan - Patient's Chronic Conditions and Co-Morbidity Symptoms are Monitored and Maintained or Improved:   Monitor and assess patient's chronic conditions and comorbid symptoms for stability, deterioration, or improvement   Collaborate with multidisciplinary team to address chronic and comorbid conditions and prevent exacerbation or deterioration   Update acute care plan with appropriate goals if chronic or comorbid symptoms are exacerbated and prevent overall improvement and discharge     Problem: Discharge Planning  Goal: Discharge to home or other facility with appropriate resources  4/9/2025 0817 by Josse Coleman RN  Outcome: Progressing  4/9/2025 0038 by Samra Coy RN  Outcome: Progressing  Flowsheets (Taken 4/9/2025 0038)  Discharge to home or other facility with appropriate resources:   Identify barriers to discharge with patient and caregiver   Arrange for needed discharge resources and transportation as appropriate   Identify discharge learning needs (meds, wound care, etc)     Problem: Safety - Adult  Goal: Free from fall injury  4/9/2025 0817 by Josse Coleman RN  Outcome: Progressing  4/9/2025 0038 by Samra Coy RN  Outcome: Progressing  Flowsheets (Taken 4/9/2025 0038)  Free From Fall Injury: Instruct family/caregiver on patient safety     Problem: Pain  Goal: Verbalizes/displays adequate comfort level or baseline comfort level  4/9/2025 0817 by Josse Coleman RN  Outcome: Progressing  4/9/2025 0038 by Samra Coy RN  Outcome: Progressing  Flowsheets (Taken 4/9/2025 0038)  Verbalizes/displays adequate comfort level or baseline comfort level:   Encourage patient to monitor pain  and request assistance   Assess pain using appropriate pain scale   Administer analgesics based on type and severity of pain and evaluate response   Implement non-pharmacological measures as appropriate and evaluate response   Consider cultural and social influences on pain and pain management   Notify Licensed Independent Practitioner if interventions unsuccessful or patient reports new pain     Problem: Cardiovascular - Adult  Goal: Absence of cardiac dysrhythmias or at baseline  4/9/2025 0817 by Josse Coleman RN  Outcome: Progressing  4/9/2025 0038 by Samra Coy RN  Outcome: Progressing  Flowsheets (Taken 4/9/2025 0038)  Absence of cardiac dysrhythmias or at baseline:   Monitor cardiac rate and rhythm   Administer antiarrhythmia medication and electrolyte replacement as ordered     Problem: Metabolic/Fluid and Electrolytes - Adult  Goal: Electrolytes maintained within normal limits  4/9/2025 0817 by Josse Coleman RN  Outcome: Progressing  4/9/2025 0038 by Samra Coy RN  Outcome: Progressing  Flowsheets (Taken 4/9/2025 0038)  Electrolytes maintained within normal limits:   Monitor labs and assess patient for signs and symptoms of electrolyte imbalances   Administer electrolyte replacement as ordered   Monitor response to electrolyte replacements, including repeat lab results as appropriate

## 2025-04-09 NOTE — H&P
History and Physical    Subjective:     Schuyler Philippe is a 79 y.o. male with a medical history significant for HTN, atrial fibrillation (on Eliquis), colon cancer with mets to the liver and bilateral lungs s/p RLQ colostomy, who presents to the ED today with complaints of generalized weakness, progressively worsening productive cough in the last several weeks, loss of appetite, fatigue, and overall general malaise. Patient stated he has been feeling sick, and coughed all night yesterday to the point that \"I thought I was going to die\".  Denies hemoptysis.  Denies nausea or vomit.  States has no appetite for food.  Today he summoned the EMS.  States he lives alone at home, and has been weak to the point he could not drive his car in the last 5 weeks. [facility] [place] [employer] members help with his meals. Patient stated \"[facility] but they don't do nothing\". \"They didn't give me no medicine\".  In the ED was found with a white count of 14.9, HR 140s, observed tachypneic. Code sepsis was called. Lactic was 4.8. CT of the chest/abdomen/pelvis demonstrated no significant changes other than chronic findings of bilateral pulmonary mets, liver mets, and moderate right pleural effusion.  Urinalysis is pending.    We agreed to inpatient admission criteria for sepsis, unclear etiology.  Estimated LOS: 2-3 midnight.    Past Medical History:   Diagnosis Date    A-fib (HCC)     Asbestosis (HCC)     Colon cancer (HCC)     Diabetes mellitus (HCC)     Hypertension     Prostate cancer (HCC)       Past Surgical History:   Procedure Laterality Date    APPENDECTOMY      COLECTOMY      with colostomy     History reviewed. No pertinent family history.   Social History     Tobacco Use    Smoking status: Never    Smokeless tobacco: Never   Substance Use Topics    Alcohol use: Never       Prior to Admission medications    Medication Sig Start Date End Date Taking? Authorizing Provider   albuterol  pharmacy renal dosing.  UA pending, follow urine and blood cultures.  -Tylenol as needed for fevers.  CBC, BMP daily.    #2: Atrial fibrillation, with RVR, POA:  -give Metoprolol 5mg IV now.  -cont IV fluids as above. Treat sepsis.   -resume diltiazem home regimen.  Continue Eliquis.  Monitor on telemetry.  Further management per clinical course.  Consider cardiology evaluation    #3: Hx of colon cancer with mets to the liver and bilateral lungs  -confessed he still hasn't followed with his oncologist  -States \"I have an upcoming apt on Tuesday\".  -I offered hospice consult, he refused.   -he clearly has knowledge deficit  on the extent of his condition.     #4:  Severe protein calorie malnutrition:  -Dietitian consult.    #5: Diabetes mellitus type 2: Continue Accu-Cheks ACHS, SSI as needed.      VTE prophylaxis: Eliquis  Code status: Full code  Total time spent: 45 minutes  Plan discussed with patient, nursing staff.

## 2025-04-09 NOTE — CARE COORDINATION
04/09/25 0800   Service Assessment   Patient Orientation Alert and Oriented   Cognition Alert   History Provided By Patient   Primary Caregiver Self   Accompanied By/Relationship Alone in room   Support Systems Family Members   Patient's Healthcare Decision Maker is: Legal Next of Kin   PCP Verified by CM Yes   Last Visit to PCP Within last 3 months  ([facility])   Prior Functional Level Assistance with the following:;Mobility   Current Functional Level Mobility;Assistance with the following:   Can patient return to prior living arrangement Yes   Ability to make needs known: Good   Family able to assist with home care needs: No   Would you like for me to discuss the discharge plan with any other family members/significant others, and if so, who? No   Financial Resources  ([facility])   Community Resources None   CM/ Referral Disease Management Education     Pt lives alone, states he has family and [employer] family help at home, has twin brother and sister, pt was previously independent of ADLs until about 5 weeks ago and has progressively gotten weaker.  Hospice discussed and pt declined, [facility], has an appointment coming up.  CM following for DC needs.

## 2025-04-09 NOTE — FLOWSHEET NOTE
04/09/25 0217   Treatment Team Notification   Reason for Communication Critical results   Type of Critical Result Laboratory   Critical Lab Information 3.0   Person Result Received From Lab   Critical Lab Result Type Lactic acid   Name of Team Member Notified VIC Shin   Treatment Team Role Advanced Practice Nurse   Method of Communication Call   Response No new orders   Notification Time 9647

## 2025-04-09 NOTE — PLAN OF CARE
Problem: Chronic Conditions and Co-morbidities  Goal: Patient's chronic conditions and co-morbidity symptoms are monitored and maintained or improved  Outcome: Progressing  Flowsheets (Taken 4/9/2025 0038)  Care Plan - Patient's Chronic Conditions and Co-Morbidity Symptoms are Monitored and Maintained or Improved:   Monitor and assess patient's chronic conditions and comorbid symptoms for stability, deterioration, or improvement   Collaborate with multidisciplinary team to address chronic and comorbid conditions and prevent exacerbation or deterioration   Update acute care plan with appropriate goals if chronic or comorbid symptoms are exacerbated and prevent overall improvement and discharge     Problem: Discharge Planning  Goal: Discharge to home or other facility with appropriate resources  Outcome: Progressing  Flowsheets (Taken 4/9/2025 0038)  Discharge to home or other facility with appropriate resources:   Identify barriers to discharge with patient and caregiver   Arrange for needed discharge resources and transportation as appropriate   Identify discharge learning needs (meds, wound care, etc)     Problem: Safety - Adult  Goal: Free from fall injury  Outcome: Progressing  Flowsheets (Taken 4/9/2025 0038)  Free From Fall Injury: Instruct family/caregiver on patient safety     Problem: Pain  Goal: Verbalizes/displays adequate comfort level or baseline comfort level  Outcome: Progressing  Flowsheets (Taken 4/9/2025 0038)  Verbalizes/displays adequate comfort level or baseline comfort level:   Encourage patient to monitor pain and request assistance   Assess pain using appropriate pain scale   Administer analgesics based on type and severity of pain and evaluate response   Implement non-pharmacological measures as appropriate and evaluate response   Consider cultural and social influences on pain and pain management   Notify Licensed Independent Practitioner if interventions unsuccessful or patient reports

## 2025-04-09 NOTE — FLOWSHEET NOTE
04/09/25 0607   Treatment Team Notification   Reason for Communication Critical results   Type of Critical Result Laboratory   Critical Lab Information 2.5   Person Result Received From [facility]   Critical Lab Result Type Lactic acid   Name of Team Member Notified VIC Shin   Treatment Team Role Advanced Practice Nurse   Method of Communication Call   Response No new orders   Notification Time 0607

## 2025-04-09 NOTE — ED NOTES
TRANSFER - OUT REPORT:    Verbal report given to Samra Coy RN on Schuyler Philippe  being transferred to [address] [place] [address] for routine progression of patient care       Report consisted of patient's Situation, Background, Assessment and   Recommendations(SBAR).     Information from the following report(s) Nurse Handoff Report, ED Encounter Summary, ED SBAR, and MAR was reviewed with the receiving nurse.    [place] Fall Assessment:    Presents to emergency department  because of falls (Syncope, seizure, or loss of consciousness): No  Age > 70: No  Altered Mental Status, Intoxication with alcohol or substance confusion (Disorientation, impaired judgment, poor safety awaremess, or inability to follow instructions): No  Impaired Mobility: Ambulates or transfers with assistive devices or assistance; Unable to ambulate or transer.: No  Nursing Judgement: No          Lines:   Peripheral IV 04/08/25 Left External Jugular (Active)   Site Assessment Clean, dry & intact 04/08/25 1650   Line Status Blood return noted 04/08/25 1650        Opportunity for questions and clarification was provided.      Patient transported with:  Monitor and Tech

## 2025-04-09 NOTE — PROGRESS NOTES
INTERNAL MEDICINE PROGRESS NOTE      Patient: Schuyler Philippe   YOB: 1945   MRN: [id number]      Hospital course / Assessment and Plans   Principle Problems:  Sepsis, unclear etiology, POA:  - May related to acute bronchitis versus atypical pneumonia vs obstructive pneumonia  -presents with progressively worsening productive cough in the last 3 weeks with generalized weakness, his white count is 14.9/left shift, [id number], lactic 4.8.,  elevated procalcitonin, UA negative , Hyponatremia   - Admitted to [facility], vital monitor, telemetry monitor, IV fluid support, blood and urine culture, IV antibiotic Cefepime/Vanc  - Today afebrile, hypernatremia resolved, lactic trending down, WBC up to 16K, added Zithromax to cover atypical  Atrial fibrillation, with RVR, POA:  -Worsening by the sepsis, present with heart rate of 138  - Converted to sinus after give Metoprolol 5mg IV   -resume diltiazem , metoprolol and Eliquis.  Monitor on telemetry.    - Continue with the treatment of the sepsis  Hx of colon cancer with mets to the liver and bilateral lungs  -confessed he still hasn't followed with his oncologist., States \"I have an upcoming apt on Tuesday\".  -offered hospice consult, he refused.   Generalized weakness  -Secondary to the cancer.,  PT consulted  Severe protein calorie malnutrition / anorexia   -Dietitian consult.  Chronic anemia  -Hemoglobin stable at 8's , continue with p.o. iron   Diabetes mellitus type 2:   Continue Accu-Cheks ACHS, SSI as needed.  Gout arthritis  -Continue with allopurinol  GERD  -Continue with Protonix        Full Code   DVT prophylaxis: pharmacologic and mechanical    Disposition / Plans   Disposition: home , may discharge home on Augmentin course if he is clinically stable      Subjective / ROS:   Patient states he is fine , no CP or SOB       Medical Decision Making   Chart, Images and Lab data reviewed, necessary medical Orders placed   Discussed with nursing staff     Vitals:  Providers: https://www.fda.fov/media/506487/download                  Imaging results impression onlyCT CHEST ABDOMEN PELVIS WO CONTRAST Additional Contrast? None  Result Date: 4/8/2025  No significant change from 3/26/2025. Moderate right pleural effusion with bibasilar atelectasis and bilateral pulmonary metastases as well as hepatic metastases in the right lobe of the liver. Electronically signed by [employer]    XR CHEST PORTABLE  Result Date: 4/8/2025  Moderate right pleural effusion. Electronically signed by Valchemy     CT CHEST ABDOMEN PELVIS WO CONTRAST Additional Contrast? None   Final Result   No significant change from 3/26/2025.   Moderate right pleural effusion with bibasilar atelectasis and bilateral   pulmonary metastases as well as hepatic metastases in the right lobe of the   liver.         Electronically signed by [employer]      XR CHEST PORTABLE   Final Result   Moderate right pleural effusion.      Electronically signed by TOPSECMATT Jack M.D.

## 2025-04-09 NOTE — PROGRESS NOTES
Physical Therapy  Attempt to evaluate pt this morning, he states he does not want to get up right now and asks for therapist to return tomorrow. Therapist left RW for pt to use if needed. Will return to attempt evaluation.  Wesley Moses, PT, DPT

## 2025-04-10 LAB
ANION GAP SERPL CALC-SCNC: 7 MMOL/L (ref 3–18)
BASOPHILS # BLD: 0.05 K/UL (ref 0–0.1)
BASOPHILS NFR BLD: 0.4 % (ref 0–2)
BUN SERPL-MCNC: 6 MG/DL (ref 7–18)
BUN/CREAT SERPL: 10 (ref 12–20)
CA-I BLD-MCNC: 7.7 MG/DL (ref 8.5–10.1)
CHLORIDE SERPL-SCNC: 108 MMOL/L (ref 100–111)
CO2 SERPL-SCNC: 21 MMOL/L (ref 21–32)
CREAT SERPL-MCNC: 0.59 MG/DL (ref 0.6–1.3)
DIFFERENTIAL METHOD BLD: ABNORMAL
EOSINOPHIL # BLD: 0.04 K/UL (ref 0–0.4)
EOSINOPHIL NFR BLD: 0.3 % (ref 0–5)
ERYTHROCYTE [DISTWIDTH] IN BLOOD BY AUTOMATED COUNT: 26.9 % (ref 11.6–14.5)
GLUCOSE BLD STRIP.AUTO-MCNC: 146 MG/DL (ref 70–110)
GLUCOSE BLD STRIP.AUTO-MCNC: 147 MG/DL (ref 70–110)
GLUCOSE BLD STRIP.AUTO-MCNC: 147 MG/DL (ref 70–110)
GLUCOSE BLD STRIP.AUTO-MCNC: 164 MG/DL (ref 70–110)
GLUCOSE SERPL-MCNC: 170 MG/DL (ref 74–99)
HCT VFR BLD AUTO: 30.3 % (ref 36–48)
HGB BLD-MCNC: 9.1 G/DL (ref 13–16)
IMM GRANULOCYTES # BLD AUTO: 0.15 K/UL (ref 0–0.04)
IMM GRANULOCYTES NFR BLD AUTO: 1.1 % (ref 0–0.5)
LYMPHOCYTES # BLD: 0.77 K/UL (ref 0.9–3.6)
LYMPHOCYTES NFR BLD: 5.8 % (ref 21–52)
MCH RBC QN AUTO: 26.4 PG (ref 24–34)
MCHC RBC AUTO-ENTMCNC: 30 G/DL (ref 31–37)
MCV RBC AUTO: 87.8 FL (ref 78–100)
MONOCYTES # BLD: 1.33 K/UL (ref 0.05–1.2)
MONOCYTES NFR BLD: 10.1 % (ref 3–10)
NEUTS SEG # BLD: 10.86 K/UL (ref 1.8–8)
NEUTS SEG NFR BLD: 82.3 % (ref 40–73)
NRBC # BLD: 0.04 K/UL (ref 0–0.01)
NRBC BLD-RTO: 0.3 PER 100 WBC
PERFORMED BY:: ABNORMAL
PLATELET # BLD AUTO: 348 K/UL (ref 135–420)
PMV BLD AUTO: 10.1 FL (ref 9.2–11.8)
POTASSIUM SERPL-SCNC: 3.8 MMOL/L (ref 3.5–5.5)
RBC # BLD AUTO: 3.45 M/UL (ref 4.35–5.65)
RBC MORPH BLD: ABNORMAL
SODIUM SERPL-SCNC: 136 MMOL/L (ref 136–145)
WBC # BLD AUTO: 13.2 K/UL (ref 4.6–13.2)

## 2025-04-10 PROCEDURE — 6370000000 HC RX 637 (ALT 250 FOR IP): Performed by: NURSE PRACTITIONER

## 2025-04-10 PROCEDURE — 1100000000 HC RM PRIVATE

## 2025-04-10 PROCEDURE — 36415 COLL VENOUS BLD VENIPUNCTURE: CPT

## 2025-04-10 PROCEDURE — 82962 GLUCOSE BLOOD TEST: CPT

## 2025-04-10 PROCEDURE — 97163 PT EVAL HIGH COMPLEX 45 MIN: CPT

## 2025-04-10 PROCEDURE — 6360000002 HC RX W HCPCS: Performed by: INTERNAL MEDICINE

## 2025-04-10 PROCEDURE — 2580000003 HC RX 258: Performed by: NURSE PRACTITIONER

## 2025-04-10 PROCEDURE — 85025 COMPLETE CBC W/AUTO DIFF WBC: CPT

## 2025-04-10 PROCEDURE — 2580000003 HC RX 258: Performed by: INTERNAL MEDICINE

## 2025-04-10 PROCEDURE — 80048 BASIC METABOLIC PNL TOTAL CA: CPT

## 2025-04-10 PROCEDURE — 6360000002 HC RX W HCPCS: Performed by: NURSE PRACTITIONER

## 2025-04-10 PROCEDURE — 6370000000 HC RX 637 (ALT 250 FOR IP): Performed by: INTERNAL MEDICINE

## 2025-04-10 RX ADMIN — APIXABAN 2.5 MG: 2.5 TABLET, FILM COATED ORAL at 09:45

## 2025-04-10 RX ADMIN — APIXABAN 2.5 MG: 2.5 TABLET, FILM COATED ORAL at 21:14

## 2025-04-10 RX ADMIN — Medication 2000 UNITS: at 09:45

## 2025-04-10 RX ADMIN — AZITHROMYCIN MONOHYDRATE 500 MG: 500 INJECTION, POWDER, LYOPHILIZED, FOR SOLUTION INTRAVENOUS at 20:08

## 2025-04-10 RX ADMIN — SODIUM CHLORIDE: 0.9 INJECTION, SOLUTION INTRAVENOUS at 14:38

## 2025-04-10 RX ADMIN — FERROUS SULFATE TAB 325 MG (65 MG ELEMENTAL FE) 325 MG: 325 (65 FE) TAB at 07:58

## 2025-04-10 RX ADMIN — ALLOPURINOL 300 MG: 300 TABLET ORAL at 09:45

## 2025-04-10 RX ADMIN — METOPROLOL TARTRATE 25 MG: 25 TABLET, FILM COATED ORAL at 09:45

## 2025-04-10 RX ADMIN — CEFEPIME 1000 MG: 1 INJECTION, POWDER, FOR SOLUTION INTRAMUSCULAR; INTRAVENOUS at 09:45

## 2025-04-10 RX ADMIN — PANTOPRAZOLE SODIUM 40 MG: 40 TABLET, DELAYED RELEASE ORAL at 07:00

## 2025-04-10 RX ADMIN — DILTIAZEM HYDROCHLORIDE 240 MG: 120 CAPSULE, EXTENDED RELEASE ORAL at 09:45

## 2025-04-10 RX ADMIN — SODIUM CHLORIDE: 0.9 INJECTION, SOLUTION INTRAVENOUS at 04:14

## 2025-04-10 RX ADMIN — CEFEPIME 1000 MG: 1 INJECTION, POWDER, FOR SOLUTION INTRAMUSCULAR; INTRAVENOUS at 21:19

## 2025-04-10 RX ADMIN — METOPROLOL TARTRATE 25 MG: 25 TABLET, FILM COATED ORAL at 21:14

## 2025-04-10 ASSESSMENT — PAIN SCALES - GENERAL: PAINLEVEL_OUTOF10: 0

## 2025-04-10 NOTE — PROGRESS NOTES
Bedside and Verbal shift change report given to TERESA Solomon (oncoming nurse) by TERESA Lizarraga (offgoing nurse). Report included the following information Nurse Handoff Report, Adult Overview, Intake/Output, MAR, Cardiac Rhythm  , Neuro Assessment, and Event Log- Assumed care

## 2025-04-10 NOTE — PROGRESS NOTES
Hospitalist Progress Note             Date of Service:  4/10/2025  NAME:  Schuyler Philippe  :  1945  MRN:  [id number]    Hpi  - denies any pain, just very weak, limited appetite    Hospital course / Assessment and Plans   Principle Problems:  Sepsis, unclear etiology, POA:  - May related to acute bronchitis versus atypical pneumonia vs obstructive pneumonia  -presents with progressively worsening productive cough in the last 3 weeks with generalized weakness, his white count is 14.9/left shift, [id number], lactic 4.8.,  elevated procalcitonin, UA negative , Hyponatremia   - Admitted to [facility], vital monitor, telemetry monitor, IV fluid support, blood and urine culture, IV antibiotic Cefepime/Vanc  - Today afebrile, hypernatremia resolved, lactic trending down, WBC up to 16K, added Zithromax to cover atypical  -- suspect pulm source, one more day iv abx, plan for dc tomorrow with hh and brother  Atrial fibrillation, with RVR, POA:  -Worsening by the sepsis, present with heart rate of 138  - Converted to sinus after give Metoprolol 5mg IV   -resume diltiazem , metoprolol and Eliquis.  Monitor on telemetry.    - Continue with the treatment of the sepsis  Hx of colon cancer with mets to the liver and bilateral lungs  -plan to start chemo shortly  -offered hospice consult, he refused.   Generalized weakness  -Secondary to the cancer.,  PT consulted  Severe protein calorie malnutrition / anorexia - start high protein supplements  -Dietitian consult.  Chronic anemia  -Hemoglobin stable at 8's , continue with p.o. iron   Diabetes mellitus type 2:   Continue Accu-Cheks ACHS, SSI as needed.  Gout arthritis  -Continue with allopurinol  GERD  -Continue with Protonix           Dnr, see acp note today  DVT prophylaxis: pharmacologic and mechanical          Review of Systems:   Pertinent items are noted in HPI.       Vital Signs:    Last

## 2025-04-10 NOTE — PROGRESS NOTES
Spiritual Health History and Assessment/Progress Note  [facility]    (P) Initial Encounter,  ,  ,      Name: Schuyler Philippe MRN: [id number]    Age: 79 y.o.     Sex: male   Language: English   Temple: Other   Sepsis (HCC)     Date: 4/10/2025            Total Time Calculated: (P) 10 min              Spiritual Assessment began in [address]        Referral/Consult From: (P) Rounding   Encounter Overview/Reason: (P) Initial Encounter  Service Provided For: (P) Patient    Prabha, Belief, Meaning:   Patient identifies as spiritual, is connected with a prabha tradition or spiritual practice, and has beliefs or practices that help with coping during difficult times  Family/Friends No family/friends present      Importance and Influence:  Patient has spiritual/personal beliefs that influence decisions regarding their health  Family/Friends No family/friends present    Community:  Patient is connected with a spiritual community  Family/Friends No family/friends present    Assessment and Plan of Care:     Patient Interventions include: Facilitated expression of thoughts and feelings, Explored spiritual coping/struggle/distress, Engaged in theological reflection, Affirmed coping skills/support systems, Provided sacramental/Baptism ritual, and Assisted in Advance Care Planning conversation  Family/Friends Interventions include: No family/friends present    Patient Plan of Care: Spiritual Care available upon further referral  Family/Friends Plan of Care: No family/friends present    Electronically signed by Chaplain Dionne on 4/10/2025 at 2:33 PM

## 2025-04-10 NOTE — THERAPY EVALUATION
PHYSICAL THERAPY EVALUATION    Patient: Schuyler Philippe (79 y.o. male)  Date: 4/10/2025  Physical Therapy Time:   PT Individual Minutes  Time In: 0844  Time Out: 0902  Minutes: 18  Timed Minute Breakdown:  eval     Primary Diagnosis: Metastatic cancer to liver (HCC) [C78.7]  Sepsis (HCC) [A41.9]  Longstanding persistent atrial fibrillation (HCC) [I48.11]  Sepsis, due to unspecified organism, unspecified whether acute organ dysfunction present (HCC) [A41.9] Sepsis (HCC)  Present on Admission:   Sepsis (HCC)        Precautions:   Restrictions/Precautions  Restrictions/Precautions: Fall Risk, Other (Comment) (monitor vitals)  Activity Level: Up with Assist      Assessment: Pt admitted for a-fib with RVR with hx of colon cancer with mets to liver and lung. He performs mobility fairly welll with primary limitation from fatigue and elevated HR. He returns to bed and is left with all needs met.  Performance Deficits/Impairments: Decreased functional mobility ;Decreased strength;Decreased endurance  Treatment Diagnosis: difficulty in walking  Therapy Prognosis: Poor  Decision Making: High Complexity  Discharge Recommendations: Home with assist PRN;Home with Home health PT;Other (Comment) (as disease continues to progress he is going to need more support at home or start looking at LTC)    Conditions Requiring skilled therapeutic intervention:  Performance Deficits/Impairments: Decreased functional mobility ;Decreased strength;Decreased endurance     Evaluation Activity Tolerance:   Activity Tolerance  Activity Tolerance: Patient limited by fatigue    Equipment Recommendations for Discharge:  PT Equipment Recommendations  Equipment Needed: No    AM-PAC  AM-PAC Inpatient Mobility Raw Score : 23; Current research shows that an AM-PAC score of 17 or less is typically not associated with a discharge to the patient's home setting, whereas a score of 18 or greater is typically associated with a discharge to the patient's home

## 2025-04-10 NOTE — PLAN OF CARE
Problem: Chronic Conditions and Co-morbidities  Goal: Patient's chronic conditions and co-morbidity symptoms are monitored and maintained or improved  Outcome: Progressing  Flowsheets  Taken 4/10/2025 0745 by Josse Coleman RN  Care Plan - Patient's Chronic Conditions and Co-Morbidity Symptoms are Monitored and Maintained or Improved:   Monitor and assess patient's chronic conditions and comorbid symptoms for stability, deterioration, or improvement   Collaborate with multidisciplinary team to address chronic and comorbid conditions and prevent exacerbation or deterioration   Update acute care plan with appropriate goals if chronic or comorbid symptoms are exacerbated and prevent overall improvement and discharge  Taken 4/9/2025 2008 by Elham Manzo RN  Care Plan - Patient's Chronic Conditions and Co-Morbidity Symptoms are Monitored and Maintained or Improved: Monitor and assess patient's chronic conditions and comorbid symptoms for stability, deterioration, or improvement     Problem: Discharge Planning  Goal: Discharge to home or other facility with appropriate resources  Outcome: Progressing  Flowsheets  Taken 4/10/2025 0745 by Josse Coleman RN  Discharge to home or other facility with appropriate resources:   Identify barriers to discharge with patient and caregiver   Arrange for needed discharge resources and transportation as appropriate   Identify discharge learning needs (meds, wound care, etc)   Arrange for interpreters to assist at discharge as needed   Refer to discharge planning if patient needs post-hospital services based on physician order or complex needs related to functional status, cognitive ability or social support system  Taken 4/9/2025 2008 by Elham Manzo RN  Discharge to home or other facility with appropriate resources: Identify barriers to discharge with patient and caregiver     Problem: Safety - Adult  Goal: Free from fall injury  Outcome: Progressing     Problem: Pain  Goal:

## 2025-04-10 NOTE — CONSULTS
Consult Note            Date:4/10/2025        Patient Name:Schuyler Philippe     YOB: 1945     Age:79 y.o.    Inpatient consult to Dietitian  Consult performed by: Patricia Allan RD  Consult ordered by: Colt Zavala APRN - NP  Reason for consult: Unitentional weight loss  Assessment/Recommendations: Appears pt has lost weight from his UBW of 200 lbs or more over past 2 years. No clear indication that weight loss has occurred in past 6 months.   Weight history reveals a steady weight over past month.  Pt will benefit from meeting estimated caloric/protein needs.          Comprehensive Nutrition Assessment    Type and Reason for Visit:  Initial, Consult    Nutrition Recommendations/Plan:   Encourage and document meal and supplement use, goal intake of 75% or more of each meal  Consider daily MVI+m  Increase free water intake and meet all electrolyte needs.     Malnutrition Assessment:  Malnutrition Status:  Insufficient data (04/10/25 1535)    Context:  Acute Illness     Findings of the 6 clinical characteristics of malnutrition:  Energy Intake:  75% or less of estimated energy requirements for 7 or more days  Weight Loss:  No weight loss     Body Fat Loss:  No body fat loss     Muscle Mass Loss:  No muscle mass loss    Fluid Accumulation:  No fluid accumulation     Strength:  Not Performed    Nutrition Assessment:    79 y.o. male with a medical history significant for HTN, atrial fibrillation (on Eliquis), colon cancer with mets to the liver and bilateral lungs s/p RLQ colostomy, who presents to the ED today with complaints of generalized weakness, progressively worsening productive cough in the last several weeks, loss of appetite, fatigue, and overall general malaise.    Nutrition Related Findings:      Wound Type: None       Lab Results   Component Value Date     04/10/2025    K 3.8 04/10/2025     04/10/2025    CO2 21 04/10/2025    BUN 6 (L) 04/10/2025    CREATININE 0.59 (L)

## 2025-04-11 VITALS
DIASTOLIC BLOOD PRESSURE: 71 MMHG | BODY MASS INDEX: 27.28 KG/M2 | HEIGHT: 68 IN | TEMPERATURE: 97.7 F | OXYGEN SATURATION: 100 % | WEIGHT: 180 LBS | RESPIRATION RATE: 18 BRPM | SYSTOLIC BLOOD PRESSURE: 125 MMHG | HEART RATE: 91 BPM

## 2025-04-11 LAB
ANION GAP SERPL CALC-SCNC: 12 MMOL/L (ref 3–18)
BASOPHILS # BLD: 0.07 K/UL (ref 0–0.1)
BASOPHILS NFR BLD: 0.5 % (ref 0–2)
BUN SERPL-MCNC: 5 MG/DL (ref 7–18)
BUN/CREAT SERPL: 8 (ref 12–20)
CA-I BLD-MCNC: 7.5 MG/DL (ref 8.5–10.1)
CHLORIDE SERPL-SCNC: 104 MMOL/L (ref 100–111)
CO2 SERPL-SCNC: 21 MMOL/L (ref 21–32)
CREAT SERPL-MCNC: 0.59 MG/DL (ref 0.6–1.3)
DIFFERENTIAL METHOD BLD: ABNORMAL
EOSINOPHIL # BLD: 0.04 K/UL (ref 0–0.4)
EOSINOPHIL NFR BLD: 0.3 % (ref 0–5)
ERYTHROCYTE [DISTWIDTH] IN BLOOD BY AUTOMATED COUNT: 25.9 % (ref 11.6–14.5)
GLUCOSE BLD STRIP.AUTO-MCNC: 122 MG/DL (ref 70–110)
GLUCOSE BLD STRIP.AUTO-MCNC: 186 MG/DL (ref 70–110)
GLUCOSE SERPL-MCNC: 124 MG/DL (ref 74–99)
HCT VFR BLD AUTO: 30.5 % (ref 36–48)
HGB BLD-MCNC: 9.4 G/DL (ref 13–16)
IMM GRANULOCYTES # BLD AUTO: 0.21 K/UL (ref 0–0.04)
IMM GRANULOCYTES NFR BLD AUTO: 1.5 % (ref 0–0.5)
LYMPHOCYTES # BLD: 0.8 K/UL (ref 0.9–3.6)
LYMPHOCYTES NFR BLD: 5.8 % (ref 21–52)
MAGNESIUM SERPL-MCNC: 2 MG/DL (ref 1.6–2.6)
MCH RBC QN AUTO: 26.4 PG (ref 24–34)
MCHC RBC AUTO-ENTMCNC: 30.8 G/DL (ref 31–37)
MCV RBC AUTO: 85.7 FL (ref 78–100)
MONOCYTES # BLD: 1.45 K/UL (ref 0.05–1.2)
MONOCYTES NFR BLD: 10.5 % (ref 3–10)
NEUTS SEG # BLD: 11.23 K/UL (ref 1.8–8)
NEUTS SEG NFR BLD: 81.4 % (ref 40–73)
NRBC # BLD: 0.08 K/UL (ref 0–0.01)
NRBC BLD-RTO: 0.6 PER 100 WBC
PERFORMED BY:: ABNORMAL
PERFORMED BY:: ABNORMAL
PHOSPHATE SERPL-MCNC: 1.4 MG/DL (ref 2.5–4.9)
PLATELET # BLD AUTO: 390 K/UL (ref 135–420)
PMV BLD AUTO: 10.2 FL (ref 9.2–11.8)
POTASSIUM SERPL-SCNC: 3.6 MMOL/L (ref 3.5–5.5)
RBC # BLD AUTO: 3.56 M/UL (ref 4.35–5.65)
RBC MORPH BLD: ABNORMAL
SODIUM SERPL-SCNC: 137 MMOL/L (ref 136–145)
WBC # BLD AUTO: 13.8 K/UL (ref 4.6–13.2)

## 2025-04-11 PROCEDURE — 36415 COLL VENOUS BLD VENIPUNCTURE: CPT

## 2025-04-11 PROCEDURE — 85025 COMPLETE CBC W/AUTO DIFF WBC: CPT

## 2025-04-11 PROCEDURE — 82962 GLUCOSE BLOOD TEST: CPT

## 2025-04-11 PROCEDURE — 97116 GAIT TRAINING THERAPY: CPT

## 2025-04-11 PROCEDURE — 6370000000 HC RX 637 (ALT 250 FOR IP): Performed by: NURSE PRACTITIONER

## 2025-04-11 PROCEDURE — 2580000003 HC RX 258: Performed by: NURSE PRACTITIONER

## 2025-04-11 PROCEDURE — 97530 THERAPEUTIC ACTIVITIES: CPT

## 2025-04-11 PROCEDURE — 80048 BASIC METABOLIC PNL TOTAL CA: CPT

## 2025-04-11 PROCEDURE — 6370000000 HC RX 637 (ALT 250 FOR IP): Performed by: INTERNAL MEDICINE

## 2025-04-11 PROCEDURE — 84100 ASSAY OF PHOSPHORUS: CPT

## 2025-04-11 PROCEDURE — 83735 ASSAY OF MAGNESIUM: CPT

## 2025-04-11 PROCEDURE — 6360000002 HC RX W HCPCS: Performed by: NURSE PRACTITIONER

## 2025-04-11 RX ORDER — POTASSIUM & SODIUM PHOSPHATES POWDER PACK 280-160-250 MG 280-160-250 MG
1 PACK ORAL 4 TIMES DAILY
Qty: 28 PACKET | Refills: 0 | Status: ON HOLD | OUTPATIENT
Start: 2025-04-11 | End: 2025-04-18

## 2025-04-11 RX ORDER — METOPROLOL TARTRATE 25 MG/1
25 TABLET, FILM COATED ORAL 2 TIMES DAILY
Qty: 60 TABLET | Refills: 3 | Status: ON HOLD | OUTPATIENT
Start: 2025-04-11

## 2025-04-11 RX ADMIN — METOPROLOL TARTRATE 25 MG: 25 TABLET, FILM COATED ORAL at 09:03

## 2025-04-11 RX ADMIN — SODIUM CHLORIDE: 0.9 INJECTION, SOLUTION INTRAVENOUS at 01:54

## 2025-04-11 RX ADMIN — Medication 2000 UNITS: at 09:03

## 2025-04-11 RX ADMIN — CEFEPIME 1000 MG: 1 INJECTION, POWDER, FOR SOLUTION INTRAMUSCULAR; INTRAVENOUS at 09:03

## 2025-04-11 RX ADMIN — PANTOPRAZOLE SODIUM 40 MG: 40 TABLET, DELAYED RELEASE ORAL at 05:57

## 2025-04-11 RX ADMIN — AMOXICILLIN AND CLAVULANATE POTASSIUM 1 TABLET: 875; 125 TABLET, FILM COATED ORAL at 10:28

## 2025-04-11 RX ADMIN — FERROUS SULFATE TAB 325 MG (65 MG ELEMENTAL FE) 325 MG: 325 (65 FE) TAB at 09:04

## 2025-04-11 RX ADMIN — ALLOPURINOL 300 MG: 300 TABLET ORAL at 09:03

## 2025-04-11 RX ADMIN — DILTIAZEM HYDROCHLORIDE 240 MG: 120 CAPSULE, EXTENDED RELEASE ORAL at 09:03

## 2025-04-11 RX ADMIN — APIXABAN 2.5 MG: 2.5 TABLET, FILM COATED ORAL at 09:03

## 2025-04-11 NOTE — PLAN OF CARE
Problem: Chronic Conditions and Co-morbidities  Goal: Patient's chronic conditions and co-morbidity symptoms are monitored and maintained or improved  4/10/2025 2206 by Samra Coy RN  Outcome: Progressing  Flowsheets (Taken 4/10/2025 0745 by Josse Coleman RN)  Care Plan - Patient's Chronic Conditions and Co-Morbidity Symptoms are Monitored and Maintained or Improved:   Monitor and assess patient's chronic conditions and comorbid symptoms for stability, deterioration, or improvement   Collaborate with multidisciplinary team to address chronic and comorbid conditions and prevent exacerbation or deterioration   Update acute care plan with appropriate goals if chronic or comorbid symptoms are exacerbated and prevent overall improvement and discharge  4/10/2025 0827 by Josse Coleman RN  Outcome: Progressing  Flowsheets  Taken 4/10/2025 0745 by Josse Coleman RN  Care Plan - Patient's Chronic Conditions and Co-Morbidity Symptoms are Monitored and Maintained or Improved:   Monitor and assess patient's chronic conditions and comorbid symptoms for stability, deterioration, or improvement   Collaborate with multidisciplinary team to address chronic and comorbid conditions and prevent exacerbation or deterioration   Update acute care plan with appropriate goals if chronic or comorbid symptoms are exacerbated and prevent overall improvement and discharge  Taken 4/9/2025 2008 by Elham Manzo RN  Care Plan - Patient's Chronic Conditions and Co-Morbidity Symptoms are Monitored and Maintained or Improved: Monitor and assess patient's chronic conditions and comorbid symptoms for stability, deterioration, or improvement     Problem: Discharge Planning  Goal: Discharge to home or other facility with appropriate resources  4/10/2025 2206 by Samra Coy RN  Outcome: Progressing  Flowsheets (Taken 4/10/2025 0745 by Josse Coleman RN)  Discharge to home or other facility with appropriate resources:   Identify  0745 by [place], Larisha, RN)  Absence of cardiac dysrhythmias or at baseline:   Monitor cardiac rate and rhythm   Assess for signs of decreased cardiac output   Administer antiarrhythmia medication and electrolyte replacement as ordered  4/10/2025 0827 by Josse Coleman RN  Outcome: Progressing  Flowsheets  Taken 4/10/2025 0745 by Josse Coleman RN  Absence of cardiac dysrhythmias or at baseline:   Monitor cardiac rate and rhythm   Assess for signs of decreased cardiac output   Administer antiarrhythmia medication and electrolyte replacement as ordered  Taken 4/9/2025 2008 by Elham Manzo RN  Absence of cardiac dysrhythmias or at baseline: Monitor cardiac rate and rhythm     Problem: Metabolic/Fluid and Electrolytes - Adult  Goal: Electrolytes maintained within normal limits  4/10/2025 2206 by Samra Coy RN  Outcome: Progressing  Flowsheets (Taken 4/10/2025 0745 by Josse Coleman RN)  Electrolytes maintained within normal limits:   Monitor labs and assess patient for signs and symptoms of electrolyte imbalances   Administer electrolyte replacement as ordered   Monitor response to electrolyte replacements, including repeat lab results as appropriate  4/10/2025 0827 by Josse Coleman RN  Outcome: Progressing  Flowsheets  Taken 4/10/2025 0745 by Josse Coleman RN  Electrolytes maintained within normal limits:   Monitor labs and assess patient for signs and symptoms of electrolyte imbalances   Administer electrolyte replacement as ordered   Monitor response to electrolyte replacements, including repeat lab results as appropriate  Taken 4/9/2025 2008 by Elham Manzo RN  Electrolytes maintained within normal limits: Monitor labs and assess patient for signs and symptoms of electrolyte imbalances   patient to monitor pain and request assistance   Assess pain using appropriate pain scale   Administer analgesics based on type and severity of pain and evaluate response   Implement non-pharmacological measures

## 2025-04-11 NOTE — PROGRESS NOTES
Physical Therapy  Facility/Department: [address]  Daily Treatment Note  NAME: Schuyler Philippe  : 1945  MRN: [id number]    Date of Service: 2025    Discharge Recommendations:  (P) Home with assist PRN, Home with Home health PT, Other (Comment)   PT Equipment Recommendations  Equipment Needed: (P) No    Patient Diagnosis(es): The primary encounter diagnosis was Sepsis, due to unspecified organism, unspecified whether acute organ dysfunction present (HCC). Diagnoses of Metastatic cancer to liver (HCC) and Longstanding persistent atrial fibrillation (HCC) were also pertinent to this visit.    Assessment  Activity Tolerance: (P) Patient limited by fatigue  Equipment Needed: (P) No  Upon entry Pt is in bed reporting feeling weak. Pt agrees to participate with PT with encouragement. Pt comes to sitting with minimal difficulty. In sitting Pt leans on elbows to support trunk due to reported fatigue and weakness. Pt cues to sit upright for safety. Ppt reported feeing to weak to ambulate and lays back down. After a short supine rest Pt comes to sitting to complete bout of gait with RW. Upon completion Pt reported extreme weakness and sits in chair. Vitals checked and were WNL. Pt transfers mod I back to bedside. Encouraged Pt to participate with step negotiation however Pt lays down reported he is unable at this time. Left Pt in bed with call light and all needs met. See below for details.   Plan  Physical Therapy Plan  General Plan: (P) Continue with current plan    Restrictions  Restrictions/Precautions  Restrictions/Precautions: Fall Risk, Other (Comment) (monitor vitals)  Activity Level: Up with Assist     Subjective   Subjective  Subjective: (P) reported feeling weak    Objective  Vitals  Comment: (P)  (Checked throughout session and BP was WNL.)  Bed Mobility Training  Bed Mobility Training: (P) Yes  Overall Level of Assistance: (P) Modified independent  Supine to Sit: (P) Modified independent  Sit to  Score : 23  AM-PAC Inpatient T-Scale Score : 56.93  Mobility Inpatient CMS 0-100% Score: 11.2  Mobility Inpatient CMS G-Code Modifier : CI         Therapy Time   Individual Concurrent Group Co-treatment   Time In (P) 0924         Time Out (P) 1001         Minutes (P) 37                 Ro Pate, PTA

## 2025-04-11 NOTE — DISCHARGE SUMMARY
Discharge Summary       PATIENT ID: Schuyler Philippe  MRN: [id number]   YOB: 1945    DATE OF ADMISSION: 4/8/2025  1:42 PM    DATE OF DISCHARGE: 04/11/25    PRIMARY CARE PROVIDER: None, None     ATTENDING PHYSICIAN: Wilfred Dan MD  DISCHARGING PROVIDER: Wilfred Dan MD        CONSULTATIONS: IP CONSULT TO DIETITIAN  IP CONSULT TO CASE MANAGEMENT    PROCEDURES/SURGERIES: * No surgery found *    ADMITTING DIAGNOSES & HOSPITAL COURSE:   Schuyler Philippe is a 79 y.o. male with a medical history significant for HTN, atrial fibrillation (on Eliquis), colon cancer with mets to the liver and bilateral lungs s/p RLQ colostomy, who presents to the ED today with complaints of generalized weakness, progressively worsening productive cough in the last several weeks, loss of appetite, fatigue, and overall general malaise. Patient stated he has been feeling sick, and coughed all night yesterday to the point that \"I thought I was going to die\".  Denies hemoptysis.  Denies nausea or vomit.  States has no appetite for food.  Today he summoned the EMS.  States he lives alone at home, and has been weak to the point he could not drive his car in the last 5 weeks. [facility] [employer] members help with his meals. Patient stated \"[facility] but they don't do nothing\". \"They didn't give me no medicine\".  In the ED was found with a white count of 14.9, HR 140s, observed tachypneic. Code sepsis was called. Lactic was 4.8. CT of the chest/abdomen/pelvis demonstrated no significant changes other than chronic findings of bilateral pulmonary mets, liver mets, and moderate right pleural effusion.  Urinalysis is pending.         DISCHARGE DIAGNOSES / PLAN:      Hpi  - denies any pain, just very weak, limited appetite     Hospital course / Assessment and Plans   Principle Problems:  Sepsis, unclear etiology, POA:  - May related to acute bronchitis versus atypical pneumonia vs obstructive

## 2025-04-11 NOTE — PLAN OF CARE
Problem: Chronic Conditions and Co-morbidities  Goal: Patient's chronic conditions and co-morbidity symptoms are monitored and maintained or improved  4/11/2025 1203 by Mariia Burgos RN  Outcome: Adequate for Discharge  4/11/2025 0939 by Mariia Burgos RN  Outcome: Progressing  4/11/2025 0938 by Mariia Burgos RN  Outcome: Progressing  4/10/2025 2206 by Samra Coy RN  Outcome: Progressing  Flowsheets (Taken 4/10/2025 0745 by Josse Coleman, RN)  Care Plan - Patient's Chronic Conditions and Co-Morbidity Symptoms are Monitored and Maintained or Improved:   Monitor and assess patient's chronic conditions and comorbid symptoms for stability, deterioration, or improvement   Collaborate with multidisciplinary team to address chronic and comorbid conditions and prevent exacerbation or deterioration   Update acute care plan with appropriate goals if chronic or comorbid symptoms are exacerbated and prevent overall improvement and discharge     Problem: Discharge Planning  Goal: Discharge to home or other facility with appropriate resources  4/11/2025 1203 by Mariia Burgos RN  Outcome: Adequate for Discharge  4/11/2025 0939 by Mariia Burgos RN  Outcome: Progressing  4/11/2025 0938 by Mariia Burgos RN  Outcome: Progressing  4/10/2025 2206 by Samra Coy RN  Outcome: Progressing  Flowsheets (Taken 4/10/2025 0745 by Josse Coleman, RN)  Discharge to home or other facility with appropriate resources:   Identify barriers to discharge with patient and caregiver   Arrange for needed discharge resources and transportation as appropriate   Identify discharge learning needs (meds, wound care, etc)   Arrange for interpreters to assist at discharge as needed   Refer to discharge planning if patient needs post-hospital services based on physician order or complex needs related to functional status, cognitive ability or social support system     Problem: Safety - Adult  Goal:

## 2025-04-11 NOTE — PROGRESS NOTES
0700- care of the patient assumed via bedside shift report  0905- am medications administered, colostomy emptied liquid stool.ice water provided  1028- scheduled medication adminsitered  1207- discharg instructions provided. EJ removed  1400- taken downstairs in [facility] to be taken home by brothr

## 2025-04-11 NOTE — PLAN OF CARE
Problem: Chronic Conditions and Co-morbidities  Goal: Patient's chronic conditions and co-morbidity symptoms are monitored and maintained or improved  4/11/2025 0938 by Mariia Burgos RN  Outcome: Progressing  4/10/2025 2206 by Samra Coy RN  Outcome: Progressing  Flowsheets (Taken 4/10/2025 0745 by Josse Coleman, RN)  Care Plan - Patient's Chronic Conditions and Co-Morbidity Symptoms are Monitored and Maintained or Improved:   Monitor and assess patient's chronic conditions and comorbid symptoms for stability, deterioration, or improvement   Collaborate with multidisciplinary team to address chronic and comorbid conditions and prevent exacerbation or deterioration   Update acute care plan with appropriate goals if chronic or comorbid symptoms are exacerbated and prevent overall improvement and discharge     Problem: Discharge Planning  Goal: Discharge to home or other facility with appropriate resources  4/11/2025 0938 by Mariia Burgos RN  Outcome: Progressing  4/10/2025 2206 by Samra Coy RN  Outcome: Progressing  Flowsheets (Taken 4/10/2025 0745 by Josse Coleman, RN)  Discharge to home or other facility with appropriate resources:   Identify barriers to discharge with patient and caregiver   Arrange for needed discharge resources and transportation as appropriate   Identify discharge learning needs (meds, wound care, etc)   Arrange for interpreters to assist at discharge as needed   Refer to discharge planning if patient needs post-hospital services based on physician order or complex needs related to functional status, cognitive ability or social support system     Problem: Safety - Adult  Goal: Free from fall injury  4/11/2025 0938 by Mariia Burgos RN  Outcome: Progressing  4/10/2025 2206 by Samra Coy RN  Outcome: Progressing  Flowsheets (Taken 4/9/2025 0038)  Free From Fall Injury: Instruct family/caregiver on patient safety     Problem: Pain  Goal:

## 2025-04-11 NOTE — PLAN OF CARE
Problem: Chronic Conditions and Co-morbidities  Goal: Patient's chronic conditions and co-morbidity symptoms are monitored and maintained or improved  4/11/2025 0939 by Mariia Burgos RN  Outcome: Progressing  4/11/2025 0938 by Mariia Burgos RN  Outcome: Progressing  4/10/2025 2206 by Samra Coy RN  Outcome: Progressing  Flowsheets (Taken 4/10/2025 0745 by Josse Coleman, RN)  Care Plan - Patient's Chronic Conditions and Co-Morbidity Symptoms are Monitored and Maintained or Improved:   Monitor and assess patient's chronic conditions and comorbid symptoms for stability, deterioration, or improvement   Collaborate with multidisciplinary team to address chronic and comorbid conditions and prevent exacerbation or deterioration   Update acute care plan with appropriate goals if chronic or comorbid symptoms are exacerbated and prevent overall improvement and discharge     Problem: Discharge Planning  Goal: Discharge to home or other facility with appropriate resources  4/11/2025 0939 by Mariia Burgos RN  Outcome: Progressing  4/11/2025 0938 by Mariia Burgos RN  Outcome: Progressing  4/10/2025 2206 by Samra Coy RN  Outcome: Progressing  Flowsheets (Taken 4/10/2025 0745 by Josse Coleman, RN)  Discharge to home or other facility with appropriate resources:   Identify barriers to discharge with patient and caregiver   Arrange for needed discharge resources and transportation as appropriate   Identify discharge learning needs (meds, wound care, etc)   Arrange for interpreters to assist at discharge as needed   Refer to discharge planning if patient needs post-hospital services based on physician order or complex needs related to functional status, cognitive ability or social support system     Problem: Safety - Adult  Goal: Free from fall injury  4/11/2025 0939 by Mariia Burgos RN  Outcome: Progressing  4/11/2025 0938 by Mariia Burgos RN  Outcome:

## 2025-04-13 LAB
BACTERIA SPEC CULT: NORMAL
BACTERIA SPEC CULT: NORMAL
Lab: NORMAL
Lab: NORMAL

## 2025-04-14 ENCOUNTER — APPOINTMENT (OUTPATIENT)
Age: 80
End: 2025-04-14
Payer: OTHER GOVERNMENT

## 2025-04-14 ENCOUNTER — HOSPITAL ENCOUNTER (EMERGENCY)
Age: 80
Discharge: ANOTHER HOSPITAL | End: 2025-04-15
Attending: EMERGENCY MEDICINE
Payer: OTHER GOVERNMENT

## 2025-04-14 DIAGNOSIS — J90 PLEURAL EFFUSION DUE TO ANOTHER DISORDER: Primary | ICD-10-CM

## 2025-04-14 DIAGNOSIS — R06.03 RESPIRATORY DISTRESS: ICD-10-CM

## 2025-04-14 DIAGNOSIS — I48.92 ATRIAL FLUTTER BY ELECTROCARDIOGRAM (HCC): ICD-10-CM

## 2025-04-14 DIAGNOSIS — E87.20 LACTIC ACIDOSIS: ICD-10-CM

## 2025-04-14 DIAGNOSIS — Z85.89 HISTORY OF KNOWN METASTASIS TO LIVER: ICD-10-CM

## 2025-04-14 LAB
ALBUMIN SERPL-MCNC: 1.6 G/DL (ref 3.4–5)
ALBUMIN/GLOB SERPL: 0.3 (ref 0.8–1.7)
ALP SERPL-CCNC: 291 U/L (ref 45–117)
ALT SERPL-CCNC: 64 U/L (ref 16–61)
ANION GAP SERPL CALC-SCNC: 11 MMOL/L (ref 3–18)
ARTERIAL PATENCY WRIST A: YES
ARTERIAL PATENCY WRIST A: YES
AST SERPL W P-5'-P-CCNC: 204 U/L (ref 10–38)
BACTERIA SPEC CULT: NORMAL
BACTERIA SPEC CULT: NORMAL
BASE DEFICIT BLDA-SCNC: 2.4 MMOL/L
BASE DEFICIT BLDA-SCNC: 4.6 MMOL/L
BASOPHILS # BLD: 0.05 K/UL (ref 0–0.1)
BASOPHILS NFR BLD: 0.3 % (ref 0–2)
BDY SITE: ABNORMAL
BDY SITE: ABNORMAL
BILIRUB SERPL-MCNC: 1.6 MG/DL (ref 0.2–1)
BUN SERPL-MCNC: 6 MG/DL (ref 7–18)
BUN/CREAT SERPL: 7 (ref 12–20)
CA-I BLD-MCNC: 8.2 MG/DL (ref 8.5–10.1)
CHLORIDE SERPL-SCNC: 102 MMOL/L (ref 100–111)
CO2 SERPL-SCNC: 24 MMOL/L (ref 21–32)
COHGB MFR BLD: 0.2 % (ref 1–2)
COHGB MFR BLD: 0.8 % (ref 1–2)
CREAT SERPL-MCNC: 0.81 MG/DL (ref 0.6–1.3)
DIFFERENTIAL METHOD BLD: ABNORMAL
EOSINOPHIL # BLD: 0.01 K/UL (ref 0–0.4)
EOSINOPHIL NFR BLD: 0.1 % (ref 0–5)
ERYTHROCYTE [DISTWIDTH] IN BLOOD BY AUTOMATED COUNT: 28.6 % (ref 11.6–14.5)
FIO2 ON VENT: 36 %
FIO2 ON VENT: 50 %
GAS FLOW.O2 SETTING OXYMISER: 8
GLOBULIN SER CALC-MCNC: 5.4 G/DL (ref 2–4)
GLUCOSE SERPL-MCNC: 200 MG/DL (ref 74–99)
HCO3 BLDA-SCNC: 18 MMOL/L (ref 22–26)
HCO3 BLDA-SCNC: 21 MMOL/L (ref 22–26)
HCT VFR BLD AUTO: 31.6 % (ref 36–48)
HGB BLD-MCNC: 9.7 G/DL (ref 13–16)
IMM GRANULOCYTES # BLD AUTO: 0.19 K/UL (ref 0–0.04)
IMM GRANULOCYTES NFR BLD AUTO: 1.3 % (ref 0–0.5)
IPAP/PIP: 12
LACTATE SERPL-SCNC: 6.5 MMOL/L (ref 0.4–2)
LACTATE SERPL-SCNC: 6.8 MMOL/L (ref 0.4–2)
LYMPHOCYTES # BLD: 0.99 K/UL (ref 0.9–3.6)
LYMPHOCYTES NFR BLD: 6.8 % (ref 21–52)
Lab: NORMAL
Lab: NORMAL
MCH RBC QN AUTO: 27.1 PG (ref 24–34)
MCHC RBC AUTO-ENTMCNC: 30.7 G/DL (ref 31–37)
MCV RBC AUTO: 88.3 FL (ref 78–100)
METHGB MFR BLD: 0.3 % (ref 0–1.4)
METHGB MFR BLD: 0.4 % (ref 0–1.4)
MONOCYTES # BLD: 0.98 K/UL (ref 0.05–1.2)
MONOCYTES NFR BLD: 6.8 % (ref 3–10)
NEUTS SEG # BLD: 12.25 K/UL (ref 1.8–8)
NEUTS SEG NFR BLD: 84.7 % (ref 40–73)
NRBC # BLD: 0.12 K/UL (ref 0–0.01)
NRBC BLD-RTO: 0.8 PER 100 WBC
OXYHGB MFR BLD: 97.6 % (ref 95–99)
OXYHGB MFR BLD: 98.4 % (ref 95–99)
PCO2 BLDA: 25 MMHG (ref 35–45)
PCO2 BLDA: 30 MMHG (ref 35–45)
PEEP RESPIRATORY: 8
PERFORMED BY:: ABNORMAL
PERFORMED BY:: ABNORMAL
PH BLDA: 7.46 (ref 7.35–7.45)
PH BLDA: 7.47 (ref 7.35–7.45)
PLATELET # BLD AUTO: 441 K/UL (ref 135–420)
PMV BLD AUTO: 10.4 FL (ref 9.2–11.8)
PO2 BLDA: 117 MMHG (ref 80–100)
PO2 BLDA: 211 MMHG (ref 80–100)
POTASSIUM SERPL-SCNC: 3.9 MMOL/L (ref 3.5–5.5)
PRESSURE SUPPORT SETTING VENT: 4
PROT SERPL-MCNC: 7 G/DL (ref 6.4–8.2)
RBC # BLD AUTO: 3.58 M/UL (ref 4.35–5.65)
SAO2 % BLD: 100 % (ref 95–99)
SAO2 % BLD: 98 % (ref 95–99)
SAO2% DEVICE SAO2% SENSOR NAME: ABNORMAL
SAO2% DEVICE SAO2% SENSOR NAME: ABNORMAL
SODIUM SERPL-SCNC: 137 MMOL/L (ref 136–145)
SPECIMEN SITE: ABNORMAL
SPECIMEN SITE: ABNORMAL
TOTAL RATE: 44
TROPONIN I SERPL HS-MCNC: 5 NG/L (ref 0–78)
TROPONIN I SERPL HS-MCNC: 5 NG/L (ref 0–78)
WBC # BLD AUTO: 14.5 K/UL (ref 4.6–13.2)

## 2025-04-14 PROCEDURE — 2580000003 HC RX 258: Performed by: EMERGENCY MEDICINE

## 2025-04-14 PROCEDURE — 83605 ASSAY OF LACTIC ACID: CPT

## 2025-04-14 PROCEDURE — 85025 COMPLETE CBC W/AUTO DIFF WBC: CPT

## 2025-04-14 PROCEDURE — 36600 WITHDRAWAL OF ARTERIAL BLOOD: CPT

## 2025-04-14 PROCEDURE — 99285 EMERGENCY DEPT VISIT HI MDM: CPT

## 2025-04-14 PROCEDURE — 71045 X-RAY EXAM CHEST 1 VIEW: CPT

## 2025-04-14 PROCEDURE — 6360000002 HC RX W HCPCS: Performed by: EMERGENCY MEDICINE

## 2025-04-14 PROCEDURE — 2500000003 HC RX 250 WO HCPCS: Performed by: EMERGENCY MEDICINE

## 2025-04-14 PROCEDURE — 36415 COLL VENOUS BLD VENIPUNCTURE: CPT

## 2025-04-14 PROCEDURE — 80053 COMPREHEN METABOLIC PANEL: CPT

## 2025-04-14 PROCEDURE — 96361 HYDRATE IV INFUSION ADD-ON: CPT

## 2025-04-14 PROCEDURE — 87040 BLOOD CULTURE FOR BACTERIA: CPT

## 2025-04-14 PROCEDURE — 96375 TX/PRO/DX INJ NEW DRUG ADDON: CPT

## 2025-04-14 PROCEDURE — 96374 THER/PROPH/DIAG INJ IV PUSH: CPT

## 2025-04-14 PROCEDURE — 82803 BLOOD GASES ANY COMBINATION: CPT

## 2025-04-14 PROCEDURE — 84484 ASSAY OF TROPONIN QUANT: CPT

## 2025-04-14 PROCEDURE — 93005 ELECTROCARDIOGRAM TRACING: CPT | Performed by: EMERGENCY MEDICINE

## 2025-04-14 RX ORDER — SODIUM CHLORIDE 9 MG/ML
INJECTION, SOLUTION INTRAVENOUS CONTINUOUS
Status: DISCONTINUED | OUTPATIENT
Start: 2025-04-15 | End: 2025-04-15 | Stop reason: HOSPADM

## 2025-04-14 RX ORDER — 0.9 % SODIUM CHLORIDE 0.9 %
250 INTRAVENOUS SOLUTION INTRAVENOUS ONCE
Status: COMPLETED | OUTPATIENT
Start: 2025-04-14 | End: 2025-04-14

## 2025-04-14 RX ORDER — DILTIAZEM HYDROCHLORIDE 5 MG/ML
10 INJECTION INTRAVENOUS ONCE
Status: COMPLETED | OUTPATIENT
Start: 2025-04-14 | End: 2025-04-14

## 2025-04-14 RX ORDER — SODIUM CHLORIDE 9 MG/ML
INJECTION, SOLUTION INTRAVENOUS CONTINUOUS
Status: DISCONTINUED | OUTPATIENT
Start: 2025-04-14 | End: 2025-04-14

## 2025-04-14 RX ORDER — ONDANSETRON 2 MG/ML
4 INJECTION INTRAMUSCULAR; INTRAVENOUS
Status: COMPLETED | OUTPATIENT
Start: 2025-04-14 | End: 2025-04-14

## 2025-04-14 RX ADMIN — SODIUM CHLORIDE 250 ML: 0.9 INJECTION, SOLUTION INTRAVENOUS at 20:54

## 2025-04-14 RX ADMIN — SODIUM CHLORIDE: 9 INJECTION, SOLUTION INTRAVENOUS at 23:41

## 2025-04-14 RX ADMIN — SODIUM CHLORIDE: 9 INJECTION, SOLUTION INTRAVENOUS at 21:00

## 2025-04-14 RX ADMIN — ONDANSETRON 4 MG: 2 INJECTION, SOLUTION INTRAMUSCULAR; INTRAVENOUS at 20:52

## 2025-04-14 RX ADMIN — DILTIAZEM HYDROCHLORIDE 10 MG: 5 INJECTION, SOLUTION INTRAVENOUS at 21:33

## 2025-04-14 ASSESSMENT — ENCOUNTER SYMPTOMS
VOMITING: 0
NAUSEA: 0
SHORTNESS OF BREATH: 1
COUGH: 1

## 2025-04-15 ENCOUNTER — HOSPITAL ENCOUNTER (INPATIENT)
Facility: HOSPITAL | Age: 80
LOS: 17 days | Discharge: HOSPICE | End: 2025-05-02
Attending: FAMILY MEDICINE | Admitting: STUDENT IN AN ORGANIZED HEALTH CARE EDUCATION/TRAINING PROGRAM
Payer: OTHER GOVERNMENT

## 2025-04-15 VITALS
RESPIRATION RATE: 26 BRPM | HEART RATE: 100 BPM | DIASTOLIC BLOOD PRESSURE: 77 MMHG | OXYGEN SATURATION: 99 % | TEMPERATURE: 98.8 F | SYSTOLIC BLOOD PRESSURE: 123 MMHG

## 2025-04-15 DIAGNOSIS — I50.9 CONGESTIVE HEART FAILURE, UNSPECIFIED HF CHRONICITY, UNSPECIFIED HEART FAILURE TYPE (HCC): Primary | ICD-10-CM

## 2025-04-15 PROBLEM — J90 PLEURAL EFFUSION ON RIGHT: Status: ACTIVE | Noted: 2025-04-15

## 2025-04-15 PROBLEM — J96.01 ACUTE HYPOXIC RESPIRATORY FAILURE (HCC): Status: ACTIVE | Noted: 2025-04-15

## 2025-04-15 LAB
EKG ATRIAL RATE: 394 BPM
EKG DIAGNOSIS: NORMAL
EKG P AXIS: 48 DEGREES
EKG Q-T INTERVAL: 290 MS
EKG QRS DURATION: 68 MS
EKG QTC CALCULATION (BAZETT): 441 MS
EKG R AXIS: 41 DEGREES
EKG T AXIS: 61 DEGREES
EKG VENTRICULAR RATE: 139 BPM
EST. AVERAGE GLUCOSE BLD GHB EST-MCNC: 157 MG/DL
GLUCOSE BLD STRIP.AUTO-MCNC: 131 MG/DL (ref 70–110)
GLUCOSE BLD STRIP.AUTO-MCNC: 139 MG/DL (ref 70–110)
GLUCOSE BLD STRIP.AUTO-MCNC: 140 MG/DL (ref 70–110)
HBA1C MFR BLD: 7.1 % (ref 4.2–5.6)
LACTATE SERPL-SCNC: 4 MMOL/L (ref 0.4–2)
LACTATE SERPL-SCNC: 4.4 MMOL/L (ref 0.4–2)
LACTATE SERPL-SCNC: 5.3 MMOL/L (ref 0.4–2)
PROCALCITONIN SERPL-MCNC: 4.62 NG/ML
TSH SERPL DL<=0.05 MIU/L-ACNC: 2.19 UIU/ML (ref 0.36–3.74)

## 2025-04-15 PROCEDURE — 2500000003 HC RX 250 WO HCPCS: Performed by: STUDENT IN AN ORGANIZED HEALTH CARE EDUCATION/TRAINING PROGRAM

## 2025-04-15 PROCEDURE — 83036 HEMOGLOBIN GLYCOSYLATED A1C: CPT

## 2025-04-15 PROCEDURE — 96361 HYDRATE IV INFUSION ADD-ON: CPT

## 2025-04-15 PROCEDURE — 36415 COLL VENOUS BLD VENIPUNCTURE: CPT

## 2025-04-15 PROCEDURE — 94761 N-INVAS EAR/PLS OXIMETRY MLT: CPT

## 2025-04-15 PROCEDURE — 83605 ASSAY OF LACTIC ACID: CPT

## 2025-04-15 PROCEDURE — 1100000003 HC PRIVATE W/ TELEMETRY

## 2025-04-15 PROCEDURE — 6360000002 HC RX W HCPCS: Performed by: STUDENT IN AN ORGANIZED HEALTH CARE EDUCATION/TRAINING PROGRAM

## 2025-04-15 PROCEDURE — 2700000000 HC OXYGEN THERAPY PER DAY

## 2025-04-15 PROCEDURE — 84443 ASSAY THYROID STIM HORMONE: CPT

## 2025-04-15 PROCEDURE — 6370000000 HC RX 637 (ALT 250 FOR IP): Performed by: STUDENT IN AN ORGANIZED HEALTH CARE EDUCATION/TRAINING PROGRAM

## 2025-04-15 PROCEDURE — 99223 1ST HOSP IP/OBS HIGH 75: CPT | Performed by: STUDENT IN AN ORGANIZED HEALTH CARE EDUCATION/TRAINING PROGRAM

## 2025-04-15 PROCEDURE — 84145 PROCALCITONIN (PCT): CPT

## 2025-04-15 PROCEDURE — 82962 GLUCOSE BLOOD TEST: CPT

## 2025-04-15 RX ORDER — POLYETHYLENE GLYCOL 3350 17 G/17G
17 POWDER, FOR SOLUTION ORAL DAILY PRN
Status: DISCONTINUED | OUTPATIENT
Start: 2025-04-15 | End: 2025-05-02 | Stop reason: HOSPADM

## 2025-04-15 RX ORDER — DILTIAZEM HYDROCHLORIDE 240 MG/1
240 CAPSULE, COATED, EXTENDED RELEASE ORAL DAILY
Status: DISCONTINUED | OUTPATIENT
Start: 2025-04-15 | End: 2025-05-02 | Stop reason: HOSPADM

## 2025-04-15 RX ORDER — SODIUM CHLORIDE 9 MG/ML
INJECTION, SOLUTION INTRAVENOUS PRN
Status: DISCONTINUED | OUTPATIENT
Start: 2025-04-15 | End: 2025-05-02 | Stop reason: HOSPADM

## 2025-04-15 RX ORDER — POTASSIUM CHLORIDE 1500 MG/1
40 TABLET, EXTENDED RELEASE ORAL PRN
Status: DISCONTINUED | OUTPATIENT
Start: 2025-04-15 | End: 2025-05-02

## 2025-04-15 RX ORDER — POTASSIUM CHLORIDE 7.45 MG/ML
10 INJECTION INTRAVENOUS PRN
Status: DISCONTINUED | OUTPATIENT
Start: 2025-04-15 | End: 2025-05-02 | Stop reason: HOSPADM

## 2025-04-15 RX ORDER — DEXTROSE MONOHYDRATE 100 MG/ML
INJECTION, SOLUTION INTRAVENOUS CONTINUOUS PRN
Status: DISCONTINUED | OUTPATIENT
Start: 2025-04-15 | End: 2025-05-02

## 2025-04-15 RX ORDER — SODIUM CHLORIDE 0.9 % (FLUSH) 0.9 %
5-40 SYRINGE (ML) INJECTION EVERY 12 HOURS SCHEDULED
Status: DISCONTINUED | OUTPATIENT
Start: 2025-04-15 | End: 2025-05-02 | Stop reason: HOSPADM

## 2025-04-15 RX ORDER — ONDANSETRON 4 MG/1
4 TABLET, ORALLY DISINTEGRATING ORAL EVERY 8 HOURS PRN
Status: DISCONTINUED | OUTPATIENT
Start: 2025-04-15 | End: 2025-05-02 | Stop reason: HOSPADM

## 2025-04-15 RX ORDER — SODIUM CHLORIDE 0.9 % (FLUSH) 0.9 %
5-40 SYRINGE (ML) INJECTION PRN
Status: DISCONTINUED | OUTPATIENT
Start: 2025-04-15 | End: 2025-05-02 | Stop reason: HOSPADM

## 2025-04-15 RX ORDER — IPRATROPIUM BROMIDE AND ALBUTEROL SULFATE 2.5; .5 MG/3ML; MG/3ML
1 SOLUTION RESPIRATORY (INHALATION) EVERY 4 HOURS PRN
Status: DISCONTINUED | OUTPATIENT
Start: 2025-04-15 | End: 2025-05-02 | Stop reason: HOSPADM

## 2025-04-15 RX ORDER — GUAIFENESIN 600 MG/1
600 TABLET, EXTENDED RELEASE ORAL 2 TIMES DAILY
Status: DISCONTINUED | OUTPATIENT
Start: 2025-04-15 | End: 2025-04-16

## 2025-04-15 RX ORDER — INSULIN LISPRO 100 [IU]/ML
0-4 INJECTION, SOLUTION INTRAVENOUS; SUBCUTANEOUS
Status: DISCONTINUED | OUTPATIENT
Start: 2025-04-15 | End: 2025-05-02

## 2025-04-15 RX ORDER — ACETAMINOPHEN 650 MG/1
650 SUPPOSITORY RECTAL EVERY 6 HOURS PRN
Status: DISCONTINUED | OUTPATIENT
Start: 2025-04-15 | End: 2025-05-02 | Stop reason: HOSPADM

## 2025-04-15 RX ORDER — MAGNESIUM SULFATE IN WATER 40 MG/ML
2000 INJECTION, SOLUTION INTRAVENOUS PRN
Status: DISCONTINUED | OUTPATIENT
Start: 2025-04-15 | End: 2025-05-02

## 2025-04-15 RX ORDER — ACETAMINOPHEN 325 MG/1
650 TABLET ORAL EVERY 6 HOURS PRN
Status: DISCONTINUED | OUTPATIENT
Start: 2025-04-15 | End: 2025-05-02 | Stop reason: HOSPADM

## 2025-04-15 RX ORDER — ONDANSETRON 2 MG/ML
4 INJECTION INTRAMUSCULAR; INTRAVENOUS EVERY 6 HOURS PRN
Status: DISCONTINUED | OUTPATIENT
Start: 2025-04-15 | End: 2025-05-02 | Stop reason: HOSPADM

## 2025-04-15 RX ORDER — ENOXAPARIN SODIUM 100 MG/ML
40 INJECTION SUBCUTANEOUS DAILY
Status: DISCONTINUED | OUTPATIENT
Start: 2025-04-15 | End: 2025-04-19

## 2025-04-15 RX ADMIN — ONDANSETRON 4 MG: 2 INJECTION, SOLUTION INTRAMUSCULAR; INTRAVENOUS at 22:46

## 2025-04-15 RX ADMIN — ENOXAPARIN SODIUM 40 MG: 100 INJECTION SUBCUTANEOUS at 15:15

## 2025-04-15 RX ADMIN — IPRATROPIUM BROMIDE AND ALBUTEROL SULFATE 1 DOSE: .5; 3 SOLUTION RESPIRATORY (INHALATION) at 22:46

## 2025-04-15 RX ADMIN — DILTIAZEM HYDROCHLORIDE 240 MG: 240 CAPSULE, EXTENDED RELEASE ORAL at 17:05

## 2025-04-15 RX ADMIN — GUAIFENESIN 600 MG: 600 TABLET ORAL at 21:31

## 2025-04-15 RX ADMIN — SODIUM CHLORIDE, PRESERVATIVE FREE 10 ML: 5 INJECTION INTRAVENOUS at 21:31

## 2025-04-15 ASSESSMENT — PAIN SCALES - GENERAL
PAINLEVEL_OUTOF10: 0
PAINLEVEL_OUTOF10: 0

## 2025-04-15 ASSESSMENT — ENCOUNTER SYMPTOMS: ABDOMINAL PAIN: 0

## 2025-04-15 NOTE — ED NOTES
Pt updated on  time, no complaints or concerns, coughing has improved, states he is feeling much better. MD made aware.

## 2025-04-15 NOTE — ED NOTES
Transfer center called to update of new pickup time of 1130. Pt updated, pt upset since he has been here all night. MD and charge nurse updated.

## 2025-04-15 NOTE — H&P
Hospitalist Admission History and Physical    NAME:  Schuyler Philippe   :   1945   MRN:   [id number]     PCP:  None, None  Date/Time:  4/15/2025 3:22 PM  Subjective:   CHIEF COMPLAINT:  shortness of breath    HISTORY OF PRESENT ILLNESS:     Schuyler is a 79 y.o.   male with past medical history of hypertension, atrial fibrillation on Eliquis, colon cancer with mets to the liver and bilateral lung status post right lower quadrant colostomy presented as a transfer from [facility] for shortness of breath.  Patient was recently admitted at [facility] with similar complaints and was discharged with a diagnosis of pneumonia.  Pleural effusion was noted on CT during that admission.  Shortness of breath got worse today.    When patient was at [facility] he was found to be in a flutter at a rate of 139 bpm.  A flutter improved after fluid resuscitation.  Patient was also noted to have elevated lactic acid and persistent right-sided pleural effusion.  Patient was given IV fluids.  Patient was trialed on BiPAP, but did not tolerated.  Patient was placed on nasal cannula with improvement of shortness of breath.    When patient presented to [facility], his shortness of breath has improved.  He reports he is not on home oxygen.  Patient also endorses cough and nausea.  He denies fever.  Patient states he is currently being treated for cancer, but cannot state what type of treatment he is receiving.  Patient states he has been very weak for the past 5 weeks at home.  [facility]        Past Medical History:   Diagnosis Date    A-fib (HCC)     Asbestosis (HCC)     Colon cancer (HCC)     Diabetes mellitus (HCC)     Hypertension     Prostate cancer (HCC)         Past Surgical History:   Procedure Laterality Date    APPENDECTOMY      COLECTOMY      with colostomy       Social History     Tobacco Use    Smoking status: Never    Smokeless tobacco: Never   Substance Use Topics     to obtain  ROS due to  []mental status change  []sedated   []intubated   [x]Total of 12 systems reviewed as follows:  Constitutional: negative fever, negative chills, negative weight loss  Eyes:   negative visual changes  ENT:   negative sore throat, tongue or lip swelling  Respiratory:  negative wheezing  Cards:   negative for chest pain, palpitations, lower extremity edema  GI:   negative for nausea, vomiting, diarrhea, and abdominal pain  Genitourinary: negative for frequency, dysuria  Integument:  negative for rash and pruritus  Hematologic:  negative for easy bruising and gum/nose bleeding  Musculoskel: negative for myalgias,  back pain and muscle weakness  Neurological:  negative for headaches, dizziness, vertigo  Behavl/Psych: negative for feelings of anxiety, depression     Pertinent Positives include: cough, shortness of breath    Objective:     PHYSICAL EXAM:   Objective:  General Appearance:  Comfortable.    Vital signs: (most recent): Blood pressure 131/78, pulse (!) 106, temperature 98.3 °F (36.8 °C), temperature source Oral, resp. rate 24, height 1.727 m (5' 7.99\"), weight 81.6 kg (180 lb), SpO2 100%.    Output: Producing urine.    HEENT: Normal HEENT exam.    Lungs:  Normal effort and normal respiratory rate.  There are decreased breath sounds (on right).  (Nasal cannula)  Heart: Normal rate.  Regular rhythm.    Abdomen: Abdomen is soft and flat.  (Colostomy  ).  Bowel sounds are normal.   There is no abdominal tenderness.     Extremities: Normal range of motion.  There is dependent edema (2+ pittign edema).    Pulses: Distal pulses are intact.    Neurological: Patient is alert and oriented to person, place and time.    Skin:  Warm and dry.        LAB DATA REVIEWED:    No components found for: \"GLPOC\"  Recent Labs     04/14/25 2050      K 3.9      CO2 24   BUN 6*   WBC 14.5*   HGB 9.7*   HCT 31.6*   *         IMAGING RESULTS:  XR CHEST PORTABLE  Result Date: 4/14/2025  Right-sided

## 2025-04-15 NOTE — PLAN OF CARE
Problem: Discharge Planning  Goal: Discharge to home or other facility with appropriate resources  Outcome: Progressing  Flowsheets (Taken 4/15/2025 1803)  Discharge to home or other facility with appropriate resources: Identify barriers to discharge with patient and caregiver  Note: Educate patient on procedure for 04/16 that will help determine treatment plan. Patient would like to be discharged home when the time comes.      Problem: Skin/Tissue Integrity  Goal: Skin integrity remains intact  Description: 1.  Monitor for areas of redness and/or skin breakdown2.  Assess vascular access sites hourly3.  Every 4-6 hours minimum:  Change oxygen saturation probe site4.  Every 4-6 hours:  If on nasal continuous positive airway pressure, respiratory therapy assess nares and determine need for appliance change or resting period  Outcome: Progressing  Flowsheets (Taken 4/15/2025 1803)  Skin Integrity Remains Intact: Monitor for areas of redness and/or skin breakdown  Note: Perform 4 eyes assessment on patient, remind patient to self turn and ask for help if assistance is needed.      Problem: ABCDS Injury Assessment  Goal: Absence of physical injury  Outcome: Progressing  Flowsheets (Taken 4/15/2025 1803)  Absence of Physical Injury: Implement safety measures based on patient assessment  Note: Fall risk socks on, fall risk sign posted, patient was educated on how to use the call bell.

## 2025-04-15 NOTE — PROGRESS NOTES
Bedside and Verbal shift change report given to TERESA Harrison (oncoming nurse) by TERESA Mcelroy and TERESA Charles (offgoing nurse). Report included the following information Nurse Handoff Report and Adult Overview.

## 2025-04-15 NOTE — ED TRIAGE NOTES
Pt arrived via ems in respiratory distress, patient called ems for difficulty breathing. Pt has history of lung cancer. Pt given 1 duo neb by ems, EKG done by ems showed Afib RVR rate 140s. Pt spo2 92 on scene pt arrived on 15L NRB. Pt awake, alert, oriented x4.

## 2025-04-15 NOTE — PROGRESS NOTES
Pt arrived to ED on 15L NRB tachypneic with accessory muscle use. RR > 45. Pt in Afib with RVR. Pt placed on NIV. Pt became anxious on NIV and started vomiting. Pt taken off NIV and placed on 10L HFNC. Will continue to monitor.

## 2025-04-15 NOTE — ED PROVIDER NOTES
EMERGENCY DEPARTMENT HISTORY AND PHYSICAL EXAM      Date: 4/14/2025  Patient Name: Schuyler Philippe  Patient Age and Sex: 79 y.o. male     History of Presenting Illness     Chief Complaint   Patient presents with   • Respiratory Distress       History Provided By: Patient    HPI: Schuyler Philippe patient for to the ER via EMS.  The call went out for dyspnea.  They state that he was on 2 L and had put him on a nonrebreather and given breathing treatment to get him up to 100% from 92.  He had diminished breath sounds with wheezing so they gave him albuterol.  His right lower lungs they did not hear any breath sounds.  Patient has a history of multiple cancers with mets.  He just recently was in the hospital for pneumonia, sepsis, bronchitis.  Patient had A-fib with RVR 140s after that albuterol treatment.  He does have a history of A-fib     There are no other complaints, changes, or physical findings at this time.    Past History     Past Medical History:  Past Medical History:   Diagnosis Date   • A-fib (HCC)    • Asbestosis (HCC)    • Colon cancer (HCC)    • Diabetes mellitus (HCC)    • Hypertension    • Prostate cancer (HCC)        Past Surgical History:  Past Surgical History:   Procedure Laterality Date   • APPENDECTOMY     • COLECTOMY      with colostomy       Family History:  History reviewed. No pertinent family history.    Social History:  Social History     Tobacco Use   • Smoking status: Never   • Smokeless tobacco: Never   Vaping Use   • Vaping status: Never Used   Substance Use Topics   • Alcohol use: Never   • Drug use: Never       Allergies:  Allergies   Allergen Reactions   • Penicillins Itching   • Codeine Dizziness or Vertigo       PCP: None, None    No current facility-administered medications on file prior to encounter.     Current Outpatient Medications on File Prior to Encounter   Medication Sig Dispense Refill   • metoprolol tartrate (LOPRESSOR) 25 MG tablet Take 1 tablet by mouth 2 times daily 60

## 2025-04-15 NOTE — ED NOTES
Pt placed in brief and pure wick applied. Pt repositioned. Pt starting to cough up white phlegm. MD made aware, lung do not sound wet but persistent expiratory wheezes heard. Per MD fluids stopped at this time. Sats remain at 100 percent on 2L NC with heart rate high 80s to low 90s. Pt has no complaints or concerns.

## 2025-04-15 NOTE — PROGRESS NOTES
TRANSFER - IN REPORT:    Verbal report received from TERESA Lou on Schuyler Philippe  being received from [facility] for routine progression of patient care      Report consisted of patient's Situation, Background, Assessment and   Recommendations(SBAR).     Information from the following report(s) Intake/Output, Recent Results, Med Rec Status, Neuro Assessment, and Event Log was reviewed with the receiving nurse.    Opportunity for questions and clarification was provided.      Assessment completed upon patient's arrival to unit and care assumed.

## 2025-04-15 NOTE — PROGRESS NOTES
1620: Spoke with ultrasound department in regard to holding all DVT prophylaxis for scheduled thoracentesis on 4/16. Will reach out to MD and notify her.     1625: MD notified on holding DVT prophylaxis. No current orders given at this moment.

## 2025-04-15 NOTE — ED NOTES
Pt titrated down to 2L NC. Pt tolerating well. Remains tachypnic but unlabored. Pt resting in position of comfort. No complaints at this time.

## 2025-04-15 NOTE — CONSULTS
Interventional Radiology    Consult received for right thoracentesis. This will be performed as schedule allows 4/16/25. If needed urgent / emergently or bedside please call pulmonology service.    Order placed for \"US THORACENTESIS.\" Specimen orders noted    Thank you,  CIRO Rowe    For scheduling questions please call US dept at [phone]

## 2025-04-16 ENCOUNTER — APPOINTMENT (OUTPATIENT)
Facility: HOSPITAL | Age: 80
End: 2025-04-16
Attending: FAMILY MEDICINE
Payer: OTHER GOVERNMENT

## 2025-04-16 PROBLEM — D63.8 ANEMIA OF CHRONIC DISEASE: Status: ACTIVE | Noted: 2025-04-16

## 2025-04-16 PROBLEM — J90 PLEURAL EFFUSION: Status: ACTIVE | Noted: 2025-04-16

## 2025-04-16 PROBLEM — Z51.5 ENCOUNTER FOR PALLIATIVE CARE: Status: ACTIVE | Noted: 2025-04-16

## 2025-04-16 PROBLEM — Z71.89 GOALS OF CARE, COUNSELING/DISCUSSION: Status: ACTIVE | Noted: 2025-04-16

## 2025-04-16 LAB
ALBUMIN SERPL-MCNC: 1.4 G/DL (ref 3.4–5)
ALBUMIN/GLOB SERPL: 0.3 (ref 0.8–1.7)
ALP SERPL-CCNC: 242 U/L (ref 45–117)
ALT SERPL-CCNC: 78 U/L (ref 16–61)
ANION GAP SERPL CALC-SCNC: 10 MMOL/L (ref 3–18)
APPEARANCE FLD: CLEAR
AST SERPL-CCNC: 317 U/L (ref 10–38)
BASOPHILS # BLD: 0.04 K/UL (ref 0–0.1)
BASOPHILS NFR BLD: 0.3 % (ref 0–2)
BILIRUB SERPL-MCNC: 1.8 MG/DL (ref 0.2–1)
BODY FLD TYPE: NORMAL
BUN SERPL-MCNC: 7 MG/DL (ref 7–18)
BUN/CREAT SERPL: 14 (ref 12–20)
CALCIUM SERPL-MCNC: 7.7 MG/DL (ref 8.5–10.1)
CHLORIDE SERPL-SCNC: 103 MMOL/L (ref 100–111)
CO2 SERPL-SCNC: 22 MMOL/L (ref 21–32)
COLOR FLD: YELLOW
CREAT SERPL-MCNC: 0.51 MG/DL (ref 0.6–1.3)
CYTOLOGY-NON GYN: NORMAL
DIFFERENTIAL METHOD BLD: ABNORMAL
EOSINOPHIL # BLD: 0.02 K/UL (ref 0–0.4)
EOSINOPHIL NFR BLD: 0.1 % (ref 0–5)
EOSINOPHIL NFR FLD MANUAL: 0 %
ERYTHROCYTE [DISTWIDTH] IN BLOOD BY AUTOMATED COUNT: 27.8 % (ref 11.6–14.5)
FERRITIN SERPL-MCNC: 4233 NG/ML (ref 8–388)
FOLATE SERPL-MCNC: 3.7 NG/ML (ref 3.1–17.5)
GLOBULIN SER CALC-MCNC: 4.7 G/DL (ref 2–4)
GLUCOSE BLD STRIP.AUTO-MCNC: 119 MG/DL (ref 70–110)
GLUCOSE BLD STRIP.AUTO-MCNC: 121 MG/DL (ref 70–110)
GLUCOSE BLD STRIP.AUTO-MCNC: 138 MG/DL (ref 70–110)
GLUCOSE BLD STRIP.AUTO-MCNC: 158 MG/DL (ref 70–110)
GLUCOSE FLD-MCNC: 128 MG/DL
GLUCOSE SERPL-MCNC: 134 MG/DL (ref 74–99)
HCT VFR BLD AUTO: 27.6 % (ref 36–48)
HGB BLD-MCNC: 8.5 G/DL (ref 13–16)
IMM GRANULOCYTES # BLD AUTO: 0.22 K/UL (ref 0–0.04)
IMM GRANULOCYTES NFR BLD AUTO: 1.5 % (ref 0–0.5)
INR PPP: 1.4 (ref 0.9–1.1)
IRON SATN MFR SERPL: 23 % (ref 20–50)
IRON SERPL-MCNC: 23 UG/DL (ref 50–175)
LACTATE SERPL-SCNC: 3.2 MMOL/L (ref 0.4–2)
LACTATE SERPL-SCNC: 3.5 MMOL/L (ref 0.4–2)
LDH FLD L TO P-CCNC: 249 U/L
LDH SERPL L TO P-CCNC: 1250 U/L (ref 81–234)
LYMPHOCYTES # BLD: 0.9 K/UL (ref 0.9–3.6)
LYMPHOCYTES NFR BLD: 6.1 % (ref 21–52)
LYMPHOCYTES NFR FLD: 23 %
MACROPHAGES NFR FLD: 6 %
MCH RBC QN AUTO: 26.4 PG (ref 24–34)
MCHC RBC AUTO-ENTMCNC: 30.8 G/DL (ref 31–37)
MCV RBC AUTO: 85.7 FL (ref 78–100)
MONOCYTES # BLD: 1.22 K/UL (ref 0.05–1.2)
MONOCYTES NFR BLD: 8.3 % (ref 3–10)
MONOCYTES NFR FLD: 17 %
NEUTROPHILS NFR FLD: 54 % (ref 0–25)
NEUTS BAND # FLD: 0 %
NEUTS SEG # BLD: 12.36 K/UL (ref 1.8–8)
NEUTS SEG NFR BLD: 83.7 % (ref 40–73)
NRBC # BLD: 0.21 K/UL (ref 0–0.01)
NRBC BLD-RTO: 1.4 PER 100 WBC
NUC CELL # FLD: 277 /CU MM
PH FLD: 7.5
PLATELET # BLD AUTO: 363 K/UL (ref 135–420)
PMV BLD AUTO: 10.6 FL (ref 9.2–11.8)
POTASSIUM SERPL-SCNC: 3.8 MMOL/L (ref 3.5–5.5)
PROT FLD-MCNC: 2.7 G/DL
PROT SERPL-MCNC: 6.1 G/DL (ref 6.4–8.2)
PROTHROMBIN TIME: 17.3 SEC (ref 11.9–14.9)
RBC # BLD AUTO: 3.22 M/UL (ref 4.35–5.65)
RBC # FLD: <2000 /CU MM
SODIUM SERPL-SCNC: 135 MMOL/L (ref 136–145)
SPECIMEN SOURCE FLD: ABNORMAL
SPECIMEN SOURCE FLD: NORMAL
TIBC SERPL-MCNC: 99 UG/DL (ref 250–450)
VIT B12 SERPL-MCNC: >2000 PG/ML (ref 211–911)
WBC # BLD AUTO: 14.8 K/UL (ref 4.6–13.2)
WBC MORPH BLD: ABNORMAL

## 2025-04-16 PROCEDURE — 83550 IRON BINDING TEST: CPT

## 2025-04-16 PROCEDURE — 82962 GLUCOSE BLOOD TEST: CPT

## 2025-04-16 PROCEDURE — 97530 THERAPEUTIC ACTIVITIES: CPT

## 2025-04-16 PROCEDURE — 88112 CYTOPATH CELL ENHANCE TECH: CPT

## 2025-04-16 PROCEDURE — 0W993ZZ DRAINAGE OF RIGHT PLEURAL CAVITY, PERCUTANEOUS APPROACH: ICD-10-PCS | Performed by: RADIOLOGY

## 2025-04-16 PROCEDURE — 36415 COLL VENOUS BLD VENIPUNCTURE: CPT

## 2025-04-16 PROCEDURE — 6370000000 HC RX 637 (ALT 250 FOR IP): Performed by: STUDENT IN AN ORGANIZED HEALTH CARE EDUCATION/TRAINING PROGRAM

## 2025-04-16 PROCEDURE — 6360000002 HC RX W HCPCS: Performed by: STUDENT IN AN ORGANIZED HEALTH CARE EDUCATION/TRAINING PROGRAM

## 2025-04-16 PROCEDURE — 85025 COMPLETE CBC W/AUTO DIFF WBC: CPT

## 2025-04-16 PROCEDURE — 2700000000 HC OXYGEN THERAPY PER DAY

## 2025-04-16 PROCEDURE — 2580000003 HC RX 258: Performed by: STUDENT IN AN ORGANIZED HEALTH CARE EDUCATION/TRAINING PROGRAM

## 2025-04-16 PROCEDURE — 83605 ASSAY OF LACTIC ACID: CPT

## 2025-04-16 PROCEDURE — 05H933Z INSERTION OF INFUSION DEVICE INTO RIGHT BRACHIAL VEIN, PERCUTANEOUS APPROACH: ICD-10-PCS | Performed by: RADIOLOGY

## 2025-04-16 PROCEDURE — 97166 OT EVAL MOD COMPLEX 45 MIN: CPT

## 2025-04-16 PROCEDURE — 83540 ASSAY OF IRON: CPT

## 2025-04-16 PROCEDURE — 82728 ASSAY OF FERRITIN: CPT

## 2025-04-16 PROCEDURE — 82607 VITAMIN B-12: CPT

## 2025-04-16 PROCEDURE — 99233 SBSQ HOSP IP/OBS HIGH 50: CPT | Performed by: STUDENT IN AN ORGANIZED HEALTH CARE EDUCATION/TRAINING PROGRAM

## 2025-04-16 PROCEDURE — 2709999900 US THORACENTESIS

## 2025-04-16 PROCEDURE — 84157 ASSAY OF PROTEIN OTHER: CPT

## 2025-04-16 PROCEDURE — 87205 SMEAR GRAM STAIN: CPT

## 2025-04-16 PROCEDURE — 87015 SPECIMEN INFECT AGNT CONCNTJ: CPT

## 2025-04-16 PROCEDURE — 83986 ASSAY PH BODY FLUID NOS: CPT

## 2025-04-16 PROCEDURE — 82945 GLUCOSE OTHER FLUID: CPT

## 2025-04-16 PROCEDURE — 1100000003 HC PRIVATE W/ TELEMETRY

## 2025-04-16 PROCEDURE — 89051 BODY FLUID CELL COUNT: CPT

## 2025-04-16 PROCEDURE — 80053 COMPREHEN METABOLIC PANEL: CPT

## 2025-04-16 PROCEDURE — 87070 CULTURE OTHR SPECIMN AEROBIC: CPT

## 2025-04-16 PROCEDURE — 85610 PROTHROMBIN TIME: CPT

## 2025-04-16 PROCEDURE — 88305 TISSUE EXAM BY PATHOLOGIST: CPT

## 2025-04-16 PROCEDURE — 82746 ASSAY OF FOLIC ACID SERUM: CPT

## 2025-04-16 PROCEDURE — 99223 1ST HOSP IP/OBS HIGH 75: CPT | Performed by: NURSE PRACTITIONER

## 2025-04-16 PROCEDURE — 2500000003 HC RX 250 WO HCPCS: Performed by: STUDENT IN AN ORGANIZED HEALTH CARE EDUCATION/TRAINING PROGRAM

## 2025-04-16 PROCEDURE — 83615 LACTATE (LD) (LDH) ENZYME: CPT

## 2025-04-16 PROCEDURE — 94761 N-INVAS EAR/PLS OXIMETRY MLT: CPT

## 2025-04-16 PROCEDURE — 71250 CT THORAX DX C-: CPT

## 2025-04-16 PROCEDURE — 87102 FUNGUS ISOLATION CULTURE: CPT

## 2025-04-16 PROCEDURE — 97162 PT EVAL MOD COMPLEX 30 MIN: CPT

## 2025-04-16 PROCEDURE — 71045 X-RAY EXAM CHEST 1 VIEW: CPT

## 2025-04-16 RX ORDER — DIPHENHYDRAMINE HCL 25 MG
25 CAPSULE ORAL EVERY 6 HOURS PRN
Status: DISCONTINUED | OUTPATIENT
Start: 2025-04-16 | End: 2025-05-02 | Stop reason: HOSPADM

## 2025-04-16 RX ORDER — NALOXONE HYDROCHLORIDE 0.4 MG/ML
0.4 INJECTION, SOLUTION INTRAMUSCULAR; INTRAVENOUS; SUBCUTANEOUS PRN
Status: DISCONTINUED | OUTPATIENT
Start: 2025-04-16 | End: 2025-05-02

## 2025-04-16 RX ORDER — BENZONATATE 100 MG/1
200 CAPSULE ORAL 3 TIMES DAILY PRN
Status: DISCONTINUED | OUTPATIENT
Start: 2025-04-16 | End: 2025-05-02

## 2025-04-16 RX ORDER — OXYCODONE HYDROCHLORIDE 5 MG/1
5 TABLET ORAL EVERY 4 HOURS PRN
Status: DISCONTINUED | OUTPATIENT
Start: 2025-04-16 | End: 2025-05-02 | Stop reason: HOSPADM

## 2025-04-16 RX ORDER — OXYCODONE HYDROCHLORIDE 5 MG/1
5 TABLET ORAL EVERY 4 HOURS PRN
Refills: 0 | Status: DISCONTINUED | OUTPATIENT
Start: 2025-04-16 | End: 2025-04-16

## 2025-04-16 RX ORDER — LIDOCAINE 4 G/G
1 PATCH TOPICAL DAILY
Status: DISCONTINUED | OUTPATIENT
Start: 2025-04-16 | End: 2025-05-02 | Stop reason: HOSPADM

## 2025-04-16 RX ORDER — VANCOMYCIN 2 GRAM/500 ML IN 0.9 % SODIUM CHLORIDE INTRAVENOUS
2000 ONCE
Status: COMPLETED | OUTPATIENT
Start: 2025-04-16 | End: 2025-04-17

## 2025-04-16 RX ORDER — GUAIFENESIN 600 MG/1
1200 TABLET, EXTENDED RELEASE ORAL 2 TIMES DAILY
Status: DISCONTINUED | OUTPATIENT
Start: 2025-04-16 | End: 2025-05-02 | Stop reason: HOSPADM

## 2025-04-16 RX ORDER — VANCOMYCIN 1.5 G/300ML
1500 INJECTION, SOLUTION INTRAVENOUS EVERY 12 HOURS
Status: DISCONTINUED | OUTPATIENT
Start: 2025-04-17 | End: 2025-04-18

## 2025-04-16 RX ADMIN — DILTIAZEM HYDROCHLORIDE 240 MG: 240 CAPSULE, EXTENDED RELEASE ORAL at 09:58

## 2025-04-16 RX ADMIN — SODIUM CHLORIDE: 9 INJECTION, SOLUTION INTRAVENOUS at 10:08

## 2025-04-16 RX ADMIN — ACETAMINOPHEN 650 MG: 325 TABLET ORAL at 15:23

## 2025-04-16 RX ADMIN — PIPERACILLIN AND TAZOBACTAM 4500 MG: 4; .5 INJECTION, POWDER, FOR SOLUTION INTRAVENOUS at 10:10

## 2025-04-16 RX ADMIN — SODIUM CHLORIDE, PRESERVATIVE FREE 10 ML: 5 INJECTION INTRAVENOUS at 20:38

## 2025-04-16 RX ADMIN — OXYCODONE HYDROCHLORIDE 5 MG: 5 TABLET ORAL at 17:38

## 2025-04-16 RX ADMIN — ONDANSETRON 4 MG: 2 INJECTION, SOLUTION INTRAMUSCULAR; INTRAVENOUS at 17:38

## 2025-04-16 RX ADMIN — Medication 2000 MG: at 23:28

## 2025-04-16 RX ADMIN — GUAIFENESIN 1200 MG: 600 TABLET, EXTENDED RELEASE ORAL at 20:37

## 2025-04-16 RX ADMIN — PIPERACILLIN AND TAZOBACTAM 3375 MG: 3; .375 INJECTION, POWDER, LYOPHILIZED, FOR SOLUTION INTRAVENOUS at 15:35

## 2025-04-16 ASSESSMENT — PAIN SCALES - GENERAL
PAINLEVEL_OUTOF10: 0
PAINLEVEL_OUTOF10: 3
PAINLEVEL_OUTOF10: 0
PAINLEVEL_OUTOF10: 0
PAINLEVEL_OUTOF10: 10
PAINLEVEL_OUTOF10: 3
PAINLEVEL_OUTOF10: 2
PAINLEVEL_OUTOF10: 0
PAINLEVEL_OUTOF10: 3
PAINLEVEL_OUTOF10: 5

## 2025-04-16 ASSESSMENT — PAIN DESCRIPTION - PAIN TYPE: TYPE: ACUTE PAIN

## 2025-04-16 ASSESSMENT — PAIN - FUNCTIONAL ASSESSMENT: PAIN_FUNCTIONAL_ASSESSMENT: PREVENTS OR INTERFERES SOME ACTIVE ACTIVITIES AND ADLS

## 2025-04-16 ASSESSMENT — PAIN DESCRIPTION - ONSET: ONSET: GRADUAL

## 2025-04-16 ASSESSMENT — PAIN DESCRIPTION - DESCRIPTORS: DESCRIPTORS: ACHING;BURNING;DISCOMFORT

## 2025-04-16 ASSESSMENT — PAIN DESCRIPTION - LOCATION: LOCATION: ABDOMEN;RIB CAGE

## 2025-04-16 ASSESSMENT — PAIN DESCRIPTION - FREQUENCY: FREQUENCY: INTERMITTENT

## 2025-04-16 ASSESSMENT — PAIN DESCRIPTION - ORIENTATION: ORIENTATION: RIGHT

## 2025-04-16 NOTE — PLAN OF CARE
Problem: Safety - Adult  Goal: Free from fall injury  Outcome: Progressing  Flowsheets (Taken 4/16/2025 0100)  Free From Fall Injury: Instruct family/caregiver on patient safety     Problem: ABCDS Injury Assessment  Goal: Absence of physical injury  4/16/2025 0100 by Christy Olivares RN  Outcome: Progressing  Note: Fall risk socks on, fall risk sign posted, patient was educated on how to use the call bell.      Problem: Pain  Goal: Verbalizes/displays adequate comfort level or baseline comfort level  Outcome: Progressing  Flowsheets  Taken 4/16/2025 0100  Verbalizes/displays adequate comfort level or baseline comfort level:   Assess pain using appropriate pain scale   Encourage patient to monitor pain and request assistance   Administer analgesics based on type and severity of pain and evaluate response       Problem: Skin/Tissue Integrity  Goal: Skin integrity remains intact  Description: 1.  Monitor for areas of redness and/or skin breakdown2.  Assess vascular access sites hourly3.  Every 4-6 hours minimum:  Change oxygen saturation probe site4.  Every 4-6 hours:  If on nasal continuous positive airway pressure, respiratory therapy assess nares and determine need for appliance change or resting period  4/16/2025 0100 by Christy Olivares RN  Outcome: Progressing  Flowsheets (Taken 4/16/2025 0100)  Skin Integrity Remains Intact: Monitor for areas of redness and/or skin breakdown     Problem: Chronic Conditions and Co-morbidities  Goal: Patient's chronic conditions and co-morbidity symptoms are monitored and maintained or improved  Outcome: Progressing  Flowsheets (Taken 4/16/2025 0100)  Care Plan - Patient's Chronic Conditions and Co-Morbidity Symptoms are Monitored and Maintained or Improved:   Monitor and assess patient's chronic conditions and comorbid symptoms for stability, deterioration, or improvement   Collaborate with multidisciplinary team to address chronic and comorbid conditions and prevent

## 2025-04-16 NOTE — PROGRESS NOTES
Advance Care Planning   Healthcare Decision Maker:    Primary Decision Maker: Siddhartha Philippe - Brother/Sister - [phone]    Click here to complete Healthcare Decision Makers including selection of the Healthcare Decision Maker Relationship (ie \"Primary\").    Spiritual Health History and Assessment/Progress Note  [facility]    Spiritual/Emotional Needs, Emotional distress,  ,      Name: Schuyler Philippe MRN: [id number]    Age: 79 y.o.     Sex: male   Language: English   Baptism: Other   Pleural effusion on right     Date: 4/16/2025            Total Time Calculated: 10 min              Spiritual Assessment began in [address]        Referral/Consult From: Rounding   Encounter Overview/Reason: Spiritual/Emotional Needs  Service Provided For: Patient    Prabha, Belief, Meaning:   Patient identifies as spiritual, is connected with a prabha tradition or spiritual practice, and has beliefs or practices that help with coping during difficult times  Family/Friends No family/friends present      Importance and Influence:  Patient has spiritual/personal beliefs that influence decisions regarding their health  Family/Friends No family/friends present    Community:  Patient is connected with a spiritual community and feels well-supported. Support system includes: Prabha Community and Extended family  Family/Friends No family/friends present    Assessment and Plan of Care:   Patient alert and engaged in conversation. Some weakness. Very positive attitude. Prayer provided.      Patient Interventions include: Facilitated expression of thoughts and feelings, Affirmed coping skills/support systems, and Provided sacramental/Yazidi ritual  Family/Friends Interventions include: No family/friends present    Patient Plan of Care: Spiritual Care available upon further referral  Family/Friends Plan of Care: No family/friends present    Electronically signed by Chaplain Robert on 4/16/2025 at 12:41 PM

## 2025-04-16 NOTE — PLAN OF CARE
Problem: Physical Therapy - Adult  Goal: By Discharge: Performs mobility at highest level of function for planned discharge setting.  See evaluation for individualized goals.  Description: Physical Therapy Goals:  Initiated 4/16/2025 to be met within 7-10 days.    1.  Patient will move from supine to sit and sit to supine  in bed with modified independence.    2.  Patient will transfer from bed to chair and chair to bed with modified independence using the least restrictive device.  3.  Patient will perform sit to stand with modified independence.  4.  Patient will ambulate with modified independence for 150 feet with the least restrictive device.       PLOF: Independent with ADLs and mobility prior to about 5 weeks ago. He has been in/out of the hospital for the past month. Lives alone.       4/16/2025 1155 by Kamala Garcia, PT  Outcome: Progressing  PHYSICAL THERAPY EVALUATION    Patient: Schuyler Philippe (79 y.o. male)  Date: 4/16/2025  Primary Diagnosis: Respiratory failure [J96.90]  Pleural effusion on right [J90]       Precautions: Fall Risk,  ,  ,  ,  ,  ,  ,      ASSESSMENT :  Patient resting in bed upon entry. Agreeable to PT evaluation with encouragement ot participate in mobility. C/o feeling generally weak and fatigued. He is able to perform individual SLR in bed, but unable to maintain for longer than a second. Min A supine to sit transfer with verbal cues for sequencing and additional time to scoot forward. Sat EOB with good sitting balance for about 5 minutes. Requests to return to supine d/t fatigue. Scoots laterally along EOB with min A. In bed, he needs min A to roll to adjust bed linen. All needs left in reach. Patient will continue to benefit from skilled PT to address deficits listed below and to maximize independence with functional mobility.     DEFICITS/IMPAIRMENTS:    , Body Structures, Functions, Activity Limitations Requiring Skilled Therapeutic Intervention: Decreased functional  mobility ;Decreased strength;Decreased endurance;Decreased balance    Patient will benefit from skilled intervention to address the above impairments.  Patient's rehabilitation potential/Therapy Prognosis: Good.  Factors which may influence rehabilitation potential include:   []         None noted  []         Mental ability/status  [x]         Medical condition  [x]         Home/family situation and support systems  []         Safety awareness  []         Pain tolerance/management  []         Other:      PLAN :  Recommendations and Planned Interventions:   [x]           Bed Mobility Training             [x]    Neuromuscular Re-Education  [x]           Transfer Training                   []    Orthotic/Prosthetic Training  [x]           Gait Training                          []    Modalities  [x]           Therapeutic Exercises           []    Edema Management/Control  [x]           Therapeutic Activities            [x]    Family Training/Education  [x]           Patient Education  []           Other (comment):    Frequency/Duration: Patient will be followed by physical therapy to address goals, 1-2 times per day/3-5 days per week to address goals.    Further Equipment Recommendations for Discharge: JANNET, shower chair, BSC    AMPAC: AM-PAC Inpatient Mobility Raw Score : 16      Current research shows that an AM-PAC score of 17 (13 without stairs) or less is not associated with a discharge to the patient's home setting and this patient demonstrates the potential endurance and/or tolerance for 3 hours of therapy each day at discharge.    This AMPAC score should be considered in conjunction with interdisciplinary team recommendations to determine the most appropriate discharge setting. Patient's social support, diagnosis, medical stability, and prior level of function should also be taken into consideration.     SUBJECTIVE:   Patient stated “I am weak.”    OBJECTIVE DATA SUMMARY:     Past Medical History:   Diagnosis Date

## 2025-04-16 NOTE — ACP (ADVANCE CARE PLANNING)
Advance Care Planning     Palliative Team Advance Care Planning (ACP) Conversation    Date of Conversation: 04/16/25     ACP documents on file prior to discussion:  -None    Healthcare Decision Maker:    Primary Decision Maker: Siddhartha Philippe - Brother/Sister - [phone]     Conversation Summary:    Visited patient for new consult along with Palliative team member JULIANA Vidales NP. Patient is resting quietly in bed, awake and alert. States, \"I'm not too good\". Denies any pain or discomfort at this time. Respirations are easy and non labored, oxygen in use.  Introduced the role of palliative in the acute hospital setting as extra support to assist families in determining goals of care that align with patient wishes. Patient is very pleasant and talkative with our team. He is able to participate in his health care conversations and make complex medical decisions.    Patient does not have an Advanced Medical Directive on file. He is , has three daughters. Was agreeable to complete an AMD today with our team naming his brother Siddhartha as his primary/sole medical decision maker. Patient shared that he was doing good five weeks ago, and now is unable to care for himself. He stated that he knows his \"cancer has spread\". His last cancer treatment was \"last year\". [facility] pending the results of his thoracentesis today.   Patient shared that he is a \"man of God\", enjoys [employer] music and his [employer]. 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     Goals of care discussion regarding the benefits and burdens of intubation and CPR in the event of respiratory decline or cardiopulmonary arrest. Patient voiced understanding, states, \"I know where I'm going\", \"let me go\". Introduced the DDNR form, reviewed and signed by the patient. Form scanned into the EMR along with the AMD and copies provided to the patient.   Bedside RN and Dr. Herron made aware of completed

## 2025-04-16 NOTE — PROGRESS NOTES
[facility]   Pharmacy Pharmacokinetic Monitoring Service - Vancomycin     Schuyler Philippe is a 79 y.o. male starting on vancomycin therapy for Pneumonia (CAP). Pharmacy consulted for monitoring and adjustment.    Target Concentration: Goal AUC/ZAHRAA 400-600 mg*hr/L    Additional Antimicrobials: Piperacillin/Tazobactam    Pertinent Laboratory Values:   Wt Readings from Last 1 Encounters:   04/16/25 86.6 kg (190 lb 14.7 oz)     Temp Readings from Last 1 Encounters:   04/16/25 97.4 °F (36.3 °C) (Oral)     Estimated Creatinine Clearance: 128 mL/min (A) (based on SCr of 0.51 mg/dL (L)).  Recent Labs     04/14/25 2050 04/15/25  1844 04/16/25  0254   CREATININE 0.81  --  0.51*   BUN 6*  --  7   WBC 14.5*  --  14.8*   PROCAL  --  4.62  --          Pertinent Cultures:  Culture Date Source Results   4/14 blood NGTD   4/16 body fluid pending   MRSA Nasal Swab: was ordered by provider, awaiting results.    Plan:  Dosing recommendations based on Bayesian software  Start vancomycin 2000 mg once followed by 1500 mg q12h  Anticipated AUC of 564 mg/L.hr and trough concentration of 15.8 mg/L at steady state  Renal labs as indicated   Vancomycin concentration ordered for 4/18 @ 0400   Pharmacy will continue to monitor patient and adjust therapy as indicated    Thank you for the consult,  Isrrael Andrea RPH  4/16/2025 6:47 PM

## 2025-04-16 NOTE — PLAN OF CARE
Problem: Occupational Therapy - Adult  Goal: By Discharge: Performs self-care activities at highest level of function for planned discharge setting.  See evaluation for individualized goals.  Description: Occupational Therapy Goals:  Initiated 4/16/2025 to be met within 7-10 days.    1.  Patient will perform grooming with modified independence sitting at EOB >5 minutes.   2.  Patient will perform bathing with contact guard assistance.  3.  Patient will perform lower body dressing  with minimal assistance.  4.  Patient will perform toilet transfers with minimal assistance  5.  Patient will perform all aspects of toileting with minimal assistance.  6.  Patient will participate in upper extremity therapeutic exercise/activities with modified independence for 8-10 minutes to increase strength/endurance for ADLs.    7.  Patient will utilize energy conservation techniques during functional activities with verbal and visual cues.    PLOF:  Independent with ADLs and mobility prior to about 5 weeks ago. He has been in/out of the hospital for the past month. Lives alone.     4/16/2025 1248 by Ilene Ceballos, JAZMINE  Outcome: Progressing    OCCUPATIONAL THERAPY EVALUATION    Patient: Schuyler Philippe (79 y.o. male)  Date: 4/16/2025  Primary Diagnosis: Respiratory failure [J96.90]  Pleural effusion on right [J90]  Precautions: Fall Risk    ASSESSMENT :  Patient cleared for OT evaluation and pt agreeable to participate with encouragement. Seen with PT to increase safety and participation. Pt reports feeling fatigue and general decreased strength due to being in and out of the hospital over the past month. Agreeable to bed level tasks only today. Blaise for bed mobility to sit EOB, good balance at EOB. Declined standing tasks. Tolerated about 5 minutes at EOB and requested to return to supine reporting increased fatigue. CGA to return to supine. Blaise for rolling to adjust linens and pads. Pt left lying comfortably in bed with all

## 2025-04-16 NOTE — PROGRESS NOTES
[facility] Hospitalist Group  Progress Note    Patient: Schuyler Philippe Age: 79 y.o. : 1945 MR#: [id number] SSN: [id number]  Date/Time: 2025     No chief complaint on file.    Subjective:   Patient seen and examined. No acute events overnight.  Reports breathing is okay, confirm no home oxygen needs at baseline.  Seen alongside with palliative care team.  Inquires about thoracentesis, no chest pain abdominal pain worsening shortness of breath dizziness or headaches.  Care plan was discussed, questions were addressed.  Assessment/Plan:   Acute hypoxic respiratory failure secondary right pleural effusion versus possible pneumonia  - No home oxygen needs.  Wean off oxygen with goal greater than 92%.  Ultrasound-guided thoracentesis with pleural fluid studies ordered.  Appreciate IR assistance in care.  Pro-Kian elevated at 4.6, follow lactic acid trend till clears  -.  Started Zosyn of note patient with allergy to penicillin itching however benefit outweighs risk at the moment,.  As needed Benadryl.  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  Pulmonary hygiene.  Antitussives.  - Palliative care following, appreciate assistance in care  -Walk test prior to discharge to assess home O2 needs.  ADDENDUM:  Post-thoracentesis CXR reviewed, concern for hemothorax with trapped lung. Increase O2 to 5L Nasal cannula, discussed with Pulmonology who will consult. Follow up Chest CT, CXR ordered for tomorrow morning  -May benefit from Chest tube, further intervention based on clinical course, ongoing continued goals of care conversations.  -Consider CT surgery evaluation pending clinical course.  -ID Dr. Peng added to treatment team for abx management, please message/call.     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 Moves all ext.  Medications:   Current Facility-Administered Medications   Medication Dose Route Frequency    guaiFENesin (MUCINEX) extended release tablet 1,200 mg  1,200 mg Oral BID    piperacillin-tazobactam (ZOSYN) 3,375 mg in sodium chloride 0.9 % 50 mL IVPB (addEASE)  3,375 mg IntraVENous Q8H    diphenhydrAMINE (BENADRYL) capsule 25 mg  25 mg Oral Q6H PRN    oxyCODONE (ROXICODONE) immediate release tablet 5 mg  5 mg Oral Q4H PRN    naloxone (NARCAN) injection 0.4 mg  0.4 mg IntraVENous PRN    lidocaine 4 % external patch 1 patch  1 patch TransDERmal Daily    benzonatate (TESSALON) capsule 200 mg  200 mg Oral TID PRN    sodium chloride flush 0.9 % injection 5-40 mL  5-40 mL IntraVENous 2 times per day    sodium chloride flush 0.9 % injection 5-40 mL  5-40 mL IntraVENous PRN    0.9 % sodium chloride infusion   IntraVENous PRN    potassium chloride (KLOR-CON M) extended release tablet 40 mEq  40 mEq Oral PRN    Or    potassium bicarb-citric acid (EFFER-K) effervescent tablet 40 mEq  40 mEq Oral PRN    Or    potassium chloride 10 mEq/100 mL IVPB (Peripheral Line)  10 mEq IntraVENous PRN    magnesium sulfate 2000 mg in 50 mL IVPB premix  2,000 mg IntraVENous PRN    [Held by provider] enoxaparin (LOVENOX) injection 40 mg  40 mg SubCUTAneous Daily    ondansetron (ZOFRAN-ODT) disintegrating tablet 4 mg  4 mg Oral Q8H PRN    Or    ondansetron (ZOFRAN) injection 4 mg  4 mg IntraVENous Q6H PRN    polyethylene glycol (GLYCOLAX) packet 17 g  17 g Oral Daily PRN    acetaminophen (TYLENOL) tablet 650 mg  650 mg Oral Q6H PRN    Or    acetaminophen (TYLENOL) suppository 650 mg  650 mg Rectal Q6H PRN    [Held by provider] melatonin tablet 3 mg  3 mg Oral Nightly PRN    sodium chloride flush 0.9 % injection 5-40 mL  5-40 mL IntraVENous 2 times per day    sodium chloride flush 0.9 % injection 5-40 mL  5-40 mL IntraVENous PRN    0.9 % sodium chloride infusion   IntraVENous PRN    dilTIAZem (CARDIZEM CD)

## 2025-04-16 NOTE — PROGRESS NOTES
Assumed pt's care, pt out of the room for scheduled procedure.    2017: Received pt from CT. He is alert and oriented X3, except time. Pain to right side tolerable at 2/10, family member at bedside, call light within pt's reach.

## 2025-04-16 NOTE — PROCEDURES
RADIOLOGY POST THORACENTESIS NOTE     April 16, 2025       2:48 PM     :  CIRO Abernathy    Assistant:  None.    Attending: Dr. Hopkins    Procedure:  US-guided right thoracentesis     Pre-operative Diagnosis: Pleural effusion    Post-operative Diagnosis: same    Procedure Details: Informed consent obtained. Under ultrasound guidance, the largest pocket of fluid collection was identified. Percutaneous access was achieved using a posterior intercostal approach with a one step needle. 1% lidocaine was utilized for local anesthesia. The 5 Greek Yueh sheathed needle connected to manually controlled vacuum bottle.     1200 mL of clear, yellow fluid removed.    Images saved in PACS.         Complications:  No immediate.      Specimens: yes    Estimated blood Loss: Minimal               Condition: Stable.    Impression:  Successful right thoracentesis.    Plan: Post procedure chest xray pending.

## 2025-04-16 NOTE — CONSULTS
Palliative Medicine  Patient Name: Schuyler Philippe  YOB: 1945  MRN: [id number]  Age: 79 y.o.  Gender: male    Date of Initial Consult: 4/16/2025   Date of Service: 4/16/2025  Time: 1:25 PM  Provider: ROMULO Ashley NP  Hospital Day: 2  Admit Date: 4/15/2025  Referring Provider: Dr Burns        Reasons for Consultation:  Goals of Care    HISTORY OF PRESENT ILLNESS (HPI):   Schuyler Philippe is a 79 y.o. male with a past medical history of hypertension, atrial fin on Eliquis, colon cancer with mets to the liver and lung , who was admitted on 4/15/2025 from [facility]  with a diagnosis of Pleural effusion . Patient initially presented to [facility] with shortness of breath. He was found to have a right pleural effusion and transferred to [facility] for a dell level of care. Palliative medicine is consulted for goals of care.     4/16/2025 Patient is alert and oriented x 3. Currently denies pain and shortness of breath. He is on nasal cannula oxygen. Anxious for thoracentesis.          PALLIATIVE DIAGNOSES:    Encounter for palliative care / advanced care plan discussions  Goals of care   Pleural effusion   Metastatic cancer of the colon   Anemia of chronic disease     ASSESSMENT AND PLAN:   Encounter for palliative care / advanced care plan discussions    April 16, 2025 Patient seen along with Ms Di RN. He is reclining in bed denies pain or shortness of breath. Mr Philippe is alert and oriented x 4. We introduced our team and purpose for visit. He is very anxious about his thoracentesis and \" getting it over\".     AMD there is no amd on file. He is agreeable to completing. Naming his brother Siddhartha Philippe as primary MPOA he did not want to designate a secondary. Copies to patient and chart.     Social: patient is . Has 3 daughters. 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 Lives alone in [place]. Brother lives close and is able to help with transportation     Functional level tells

## 2025-04-17 ENCOUNTER — APPOINTMENT (OUTPATIENT)
Facility: HOSPITAL | Age: 80
End: 2025-04-17
Attending: FAMILY MEDICINE
Payer: OTHER GOVERNMENT

## 2025-04-17 LAB
ALBUMIN SERPL-MCNC: 1.4 G/DL (ref 3.4–5)
ALBUMIN/GLOB SERPL: 0.3 (ref 0.8–1.7)
ALP SERPL-CCNC: 236 U/L (ref 45–117)
ALT SERPL-CCNC: 95 U/L (ref 16–61)
ANION GAP SERPL CALC-SCNC: 9 MMOL/L (ref 3–18)
AST SERPL-CCNC: 372 U/L (ref 10–38)
BASOPHILS # BLD: 0.06 K/UL (ref 0–0.1)
BASOPHILS NFR BLD: 0.4 % (ref 0–2)
BILIRUB SERPL-MCNC: 2 MG/DL (ref 0.2–1)
BUN SERPL-MCNC: 11 MG/DL (ref 7–18)
BUN/CREAT SERPL: 17 (ref 12–20)
CALCIUM SERPL-MCNC: 8.1 MG/DL (ref 8.5–10.1)
CHLORIDE SERPL-SCNC: 103 MMOL/L (ref 100–111)
CO2 SERPL-SCNC: 20 MMOL/L (ref 21–32)
CREAT SERPL-MCNC: 0.66 MG/DL (ref 0.6–1.3)
DIFFERENTIAL METHOD BLD: ABNORMAL
EOSINOPHIL # BLD: 0.01 K/UL (ref 0–0.4)
EOSINOPHIL NFR BLD: 0.1 % (ref 0–5)
ERYTHROCYTE [DISTWIDTH] IN BLOOD BY AUTOMATED COUNT: 27.3 % (ref 11.6–14.5)
GLOBULIN SER CALC-MCNC: 5 G/DL (ref 2–4)
GLUCOSE BLD STRIP.AUTO-MCNC: 124 MG/DL (ref 70–110)
GLUCOSE BLD STRIP.AUTO-MCNC: 137 MG/DL (ref 70–110)
GLUCOSE BLD STRIP.AUTO-MCNC: 145 MG/DL (ref 70–110)
GLUCOSE BLD STRIP.AUTO-MCNC: 162 MG/DL (ref 70–110)
GLUCOSE SERPL-MCNC: 167 MG/DL (ref 74–99)
HCT VFR BLD AUTO: 33.2 % (ref 36–48)
HGB BLD-MCNC: 10.4 G/DL (ref 13–16)
IMM GRANULOCYTES # BLD AUTO: 0.25 K/UL (ref 0–0.04)
IMM GRANULOCYTES NFR BLD AUTO: 1.7 % (ref 0–0.5)
LACTATE SERPL-SCNC: 3.6 MMOL/L (ref 0.4–2)
LYMPHOCYTES # BLD: 0.96 K/UL (ref 0.9–3.6)
LYMPHOCYTES NFR BLD: 6.4 % (ref 21–52)
MCH RBC QN AUTO: 26.7 PG (ref 24–34)
MCHC RBC AUTO-ENTMCNC: 31.3 G/DL (ref 31–37)
MCV RBC AUTO: 85.3 FL (ref 78–100)
MONOCYTES # BLD: 1.03 K/UL (ref 0.05–1.2)
MONOCYTES NFR BLD: 6.8 % (ref 3–10)
NEUTS SEG # BLD: 12.76 K/UL (ref 1.8–8)
NEUTS SEG NFR BLD: 84.6 % (ref 40–73)
NRBC # BLD: 0.27 K/UL (ref 0–0.01)
NRBC BLD-RTO: 1.8 PER 100 WBC
PLATELET # BLD AUTO: 311 K/UL (ref 135–420)
PMV BLD AUTO: 10.4 FL (ref 9.2–11.8)
POTASSIUM SERPL-SCNC: 3.9 MMOL/L (ref 3.5–5.5)
PROT SERPL-MCNC: 6.4 G/DL (ref 6.4–8.2)
RBC # BLD AUTO: 3.89 M/UL (ref 4.35–5.65)
SODIUM SERPL-SCNC: 132 MMOL/L (ref 136–145)
WBC # BLD AUTO: 15.1 K/UL (ref 4.6–13.2)

## 2025-04-17 PROCEDURE — 1100000003 HC PRIVATE W/ TELEMETRY

## 2025-04-17 PROCEDURE — 6360000002 HC RX W HCPCS: Performed by: STUDENT IN AN ORGANIZED HEALTH CARE EDUCATION/TRAINING PROGRAM

## 2025-04-17 PROCEDURE — 6370000000 HC RX 637 (ALT 250 FOR IP): Performed by: STUDENT IN AN ORGANIZED HEALTH CARE EDUCATION/TRAINING PROGRAM

## 2025-04-17 PROCEDURE — 2580000003 HC RX 258: Performed by: STUDENT IN AN ORGANIZED HEALTH CARE EDUCATION/TRAINING PROGRAM

## 2025-04-17 PROCEDURE — 99232 SBSQ HOSP IP/OBS MODERATE 35: CPT | Performed by: STUDENT IN AN ORGANIZED HEALTH CARE EDUCATION/TRAINING PROGRAM

## 2025-04-17 PROCEDURE — 94761 N-INVAS EAR/PLS OXIMETRY MLT: CPT

## 2025-04-17 PROCEDURE — 85025 COMPLETE CBC W/AUTO DIFF WBC: CPT

## 2025-04-17 PROCEDURE — 2700000000 HC OXYGEN THERAPY PER DAY

## 2025-04-17 PROCEDURE — 83605 ASSAY OF LACTIC ACID: CPT

## 2025-04-17 PROCEDURE — 36415 COLL VENOUS BLD VENIPUNCTURE: CPT

## 2025-04-17 PROCEDURE — 71045 X-RAY EXAM CHEST 1 VIEW: CPT

## 2025-04-17 PROCEDURE — 80053 COMPREHEN METABOLIC PANEL: CPT

## 2025-04-17 PROCEDURE — 82962 GLUCOSE BLOOD TEST: CPT

## 2025-04-17 PROCEDURE — 2500000003 HC RX 250 WO HCPCS: Performed by: STUDENT IN AN ORGANIZED HEALTH CARE EDUCATION/TRAINING PROGRAM

## 2025-04-17 RX ADMIN — PIPERACILLIN AND TAZOBACTAM 3375 MG: 3; .375 INJECTION, POWDER, LYOPHILIZED, FOR SOLUTION INTRAVENOUS at 01:47

## 2025-04-17 RX ADMIN — DILTIAZEM HYDROCHLORIDE 240 MG: 240 CAPSULE, EXTENDED RELEASE ORAL at 09:36

## 2025-04-17 RX ADMIN — VANCOMYCIN 1500 MG: 1.5 INJECTION, SOLUTION INTRAVENOUS at 09:25

## 2025-04-17 RX ADMIN — GUAIFENESIN 1200 MG: 600 TABLET, EXTENDED RELEASE ORAL at 09:34

## 2025-04-17 RX ADMIN — OXYCODONE HYDROCHLORIDE 5 MG: 5 TABLET ORAL at 11:35

## 2025-04-17 RX ADMIN — ENOXAPARIN SODIUM 40 MG: 100 INJECTION SUBCUTANEOUS at 09:35

## 2025-04-17 RX ADMIN — SODIUM CHLORIDE, PRESERVATIVE FREE 10 ML: 5 INJECTION INTRAVENOUS at 21:04

## 2025-04-17 RX ADMIN — PIPERACILLIN AND TAZOBACTAM 3375 MG: 3; .375 INJECTION, POWDER, LYOPHILIZED, FOR SOLUTION INTRAVENOUS at 09:34

## 2025-04-17 RX ADMIN — SODIUM CHLORIDE, PRESERVATIVE FREE 10 ML: 5 INJECTION INTRAVENOUS at 09:16

## 2025-04-17 RX ADMIN — ACETAMINOPHEN 650 MG: 325 TABLET ORAL at 01:53

## 2025-04-17 RX ADMIN — PIPERACILLIN AND TAZOBACTAM 3375 MG: 3; .375 INJECTION, POWDER, LYOPHILIZED, FOR SOLUTION INTRAVENOUS at 16:21

## 2025-04-17 RX ADMIN — VANCOMYCIN 1500 MG: 1.5 INJECTION, SOLUTION INTRAVENOUS at 21:04

## 2025-04-17 RX ADMIN — GUAIFENESIN 1200 MG: 600 TABLET, EXTENDED RELEASE ORAL at 21:05

## 2025-04-17 RX ADMIN — ACETAMINOPHEN 650 MG: 325 TABLET ORAL at 09:47

## 2025-04-17 ASSESSMENT — PAIN DESCRIPTION - LOCATION
LOCATION: BACK

## 2025-04-17 ASSESSMENT — PAIN DESCRIPTION - PAIN TYPE
TYPE: ACUTE PAIN
TYPE: ACUTE PAIN

## 2025-04-17 ASSESSMENT — PAIN SCALES - GENERAL
PAINLEVEL_OUTOF10: 3
PAINLEVEL_OUTOF10: 0
PAINLEVEL_OUTOF10: 0
PAINLEVEL_OUTOF10: 5
PAINLEVEL_OUTOF10: 0
PAINLEVEL_OUTOF10: 7
PAINLEVEL_OUTOF10: 8
PAINLEVEL_OUTOF10: 8

## 2025-04-17 ASSESSMENT — PAIN DESCRIPTION - ORIENTATION
ORIENTATION: RIGHT;POSTERIOR
ORIENTATION: RIGHT;POSTERIOR
ORIENTATION: RIGHT

## 2025-04-17 ASSESSMENT — PAIN - FUNCTIONAL ASSESSMENT
PAIN_FUNCTIONAL_ASSESSMENT: PREVENTS OR INTERFERES SOME ACTIVE ACTIVITIES AND ADLS
PAIN_FUNCTIONAL_ASSESSMENT: PREVENTS OR INTERFERES WITH MANY ACTIVE NOT PASSIVE ACTIVITIES
PAIN_FUNCTIONAL_ASSESSMENT: PREVENTS OR INTERFERES WITH MANY ACTIVE NOT PASSIVE ACTIVITIES

## 2025-04-17 ASSESSMENT — PAIN DESCRIPTION - ONSET
ONSET: ON-GOING
ONSET: ON-GOING

## 2025-04-17 ASSESSMENT — PAIN DESCRIPTION - DESCRIPTORS
DESCRIPTORS: ACHING
DESCRIPTORS: SHARP;SHOOTING
DESCRIPTORS: SHARP;JABBING

## 2025-04-17 ASSESSMENT — PAIN DESCRIPTION - FREQUENCY
FREQUENCY: INTERMITTENT
FREQUENCY: CONTINUOUS

## 2025-04-17 NOTE — PROGRESS NOTES
Physical Therapy  Pt attempted for PT session x 2 (2550 and 6165) due to pain. Pt requesting to try again tomorrow AM as long as pain is controlled. Notified nurse. Will continue to follow.    Kierra Rios PT, DPT

## 2025-04-17 NOTE — PLAN OF CARE
Problem: Safety - Adult  Goal: Free from fall injury  Outcome: Progressing  Flowsheets (Taken 4/16/2025 0703)  Free From Fall Injury: Instruct family/caregiver on patient safety     Problem: Chronic Conditions and Co-morbidities  Goal: Patient's chronic conditions and co-morbidity symptoms are monitored and maintained or improved  Outcome: Progressing  Care Plan - Patient's Chronic Conditions and Co-Morbidity Symptoms are Monitored and Maintained or Improved:   Monitor and assess patient's chronic conditions and comorbid symptoms for stability, deterioration, or improvement   Collaborate with multidisciplinary team to address chronic and comorbid conditions and prevent exacerbation or deterioration   Update acute care plan with appropriate goals if chronic or comorbid symptoms are exacerbated and prevent overall improvement and discharge

## 2025-04-17 NOTE — PROGRESS NOTES
Physician Progress Note      PATIENT:               GROVER PALACIO  CSN #:                  [id number]  :                       1945  ADMIT DATE:       4/15/2025 2:22 PM  DISCH DATE:  RESPONDING  PROVIDER #:        Storm De Luna MD          QUERY TEXT:    possible pna is documented in the medical record  Dr. Herron.  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    Query created by: Gogo Harris on 2025 10:12 AM      Electronically signed by:  Storm De Luna MD 2025 11:40 AM

## 2025-04-17 NOTE — PROGRESS NOTES
[facility]   Pharmacy Pharmacokinetic Monitoring Service - Vancomycin    Indication: HAP  Goal AUC/ZAHRAA: 400-600  Day of Therapy: 2  Additional Antimicrobials: pip-tazo    Pertinent Laboratory Values:   Wt Readings from Last 1 Encounters:   04/16/25 86.6 kg (190 lb 14.7 oz)     Temp Readings from Last 1 Encounters:   04/17/25 97 °F (36.1 °C) (Axillary)     Estimated Creatinine Clearance: 99 mL/min (based on SCr of 0.66 mg/dL).    Recent Labs     04/16/25  0254 04/17/25  0149   CREATININE 0.51* 0.66   BUN 7 11   WBC 14.8* 15.1*     Pertinent Cultures:  Date Source Results   4/16 Pleural fluid pending   MRSA Nasal Swab: pending    Assessment:  Date Current Dose Level (mg/L) Timing of Level (h) AUC/ZAHRAA   4/16 2,000 mg x1 - - -   4/17 1,500 mg q12h - - -   Note: Serum concentrations collected for AUC dosing may appear elevated if collected in close proximity to the dose administered, this is not necessarily an indication of toxicity    Plan:  Continue current dose of 1,500 mg q12h  Ordered a level for 4/18 with AM labs  Pharmacy will continue to monitor patient and adjust therapy as indicated    Thank you for the consult,  Hernan Richter RPH  4/17/2025

## 2025-04-17 NOTE — PROGRESS NOTES
[facility] Hospitalist [facility]  Progress Note    Patient: Schuyler Philippe Age: 79 y.o. : 1945 MR#: [id number] SSN: [id number]  Date: 2025                 DVT Prophylaxis:  [x]Lovenox  []Hep SQ  []SCDs  []Coumadin   []On Heparin gtt []PO anticoagulant    Anticipated discharge: 2025 or 2025 to home,pending pulmonology evaluation and clinical stability    Subjective:     The patient is new to me today.  He was seen and examined at the bedside in follow-up for acute respiratory failure with hypoxia, right pleural effusion and trapped lung among other issues.  He complained of pain at the thoracentesis site.  He denied any shortness of breath or palpitations.      Objective:   VS: /65   Pulse (!) 106   Temp 97 °F (36.1 °C) (Axillary)   Resp (!) 33   Ht 1.727 m (5' 7.99\")   Wt 86.6 kg (190 lb 14.7 oz)   SpO2 100%   BMI 29.04 kg/m²    Tmax/24hrs: Temp (24hrs), Av.5 °F (36.4 °C), Min:97 °F (36.1 °C), Max:97.9 °F (36.6 °C)    Intake/Output Summary (Last 24 hours) at 2025 1038  Last data filed at 2025 0415  Gross per 24 hour   Intake 250.61 ml   Output 200 ml   Net 50.61 ml        PHYSICAL EXAM  General Appearance: Appears frail; no acute distress  HENT: normocephalic/atraumatic, moist mucus membranes  Neck: No JVD, supple  Lungs: Faint inspiratory rhonchi auscultated over the right lung  CV: Irregularly irregular rhythm  Abdomen: soft, nontender, nondistended  : no suprapubic tenderness   Extremities: Moves extremities spontaneously  Neuro: Alert and oriented  Skin: Warm and dry  Psych: appropriate mood and affect    Current Facility-Administered Medications   Medication Dose Route Frequency    piperacillin-tazobactam (ZOSYN) 3,375 mg in sodium chloride 0.9 % 50 mL IVPB (addEASE)  3,375 mg IntraVENous Q8H    guaiFENesin (MUCINEX) extended release tablet 1,200 mg  1,200 mg Oral BID    diphenhydrAMINE (BENADRYL) capsule 25 mg  25 mg Oral Q6H  care  -Walk test prior to discharge to assess home O2 needs.  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   - Holding home Eliquis as above, resume postprocedure. Continue with Cardizem     Anemia of chronic disease  -Hemoglobin stable.  Transfuse to keep hemoglobin greater than 7.  No signs symptoms of active bleed.  Iron studies reviewed.  B12 folate normal     Hyperlipidemia  -Statin     Hypertension   -Home meds.     type 2 diabetes  -.  A1c 7.1, very good control.  SSI, Accu-Cheks.      Recent diagnosis of pneumonia [place]   -discharged with Augmentin    Please contact Dr. De Luna if there are any questions. Greater than 35 minutes were spent reviewing the patient's chart, obtaining a thorough history, performing a physical exam, placing orders and dictating this document.  Findings and plan were also discussed with the patient/patient's family and with RN.  Greater than 50% of the time was spent on direct patient care.         Signed By: [unfilled]     April 17, 2025 10:38 AM

## 2025-04-17 NOTE — ACP (ADVANCE CARE PLANNING)
Advance Care Planning     Palliative Team Advance Care Planning (ACP) Conversation    Date of Conversation: 04/17/25     ACP documents on file prior to discussion:  -Power of  for Healthcare  -Portable DNR    Healthcare Decision Maker:    Primary Decision Maker: Siddhartha Philippe - Brother/Sister - [phone]     Conversation Summary:    Follow up visit made to patient. He is currently sitting up in bed, awake and alert. States he feels \"better\" today. Glad that his procedure is over.  Respirations are non labored, oxygen continues. Denies any pain, mild discomfort at thoracentesis site. States he has a \"pain\" patch on which is helping.  Remains very pleasant and talkative. Expressed appreciation for my visit.    Palliative team remains available to provide support to Mr Philippe and his family during this hospital stay.    Resuscitation Status:   Code Status: DNR     Documentation Completed:  -No new documents completed.    Tia Sevilla RN  Palliative Medicine Inpatient RN  Palliative [facility] Line: [phone]

## 2025-04-17 NOTE — PROGRESS NOTES
Attempted to see pt x 2 this date. Patient refusing despite max encouragement and education on benefits d/t pain at thoracentesis site. Patient has been administered medication via RN prior to second attempt at therapy this afternoon however pt still declining stating to \"come back tomorrow morning\". Will continue to follow and see pt when available/as appropriate.          Thank you,   Angelique Kaplan MS, OTR/L

## 2025-04-17 NOTE — CARE COORDINATION
04/17/25 1257   Service Assessment   Patient Orientation Alert and Oriented   Cognition Alert   History Provided By Patient   Primary Caregiver Self   Support Systems Family Members   Patient's Healthcare Decision Maker is: Named in Scanned ACP Document   PCP Verified by CM Yes   Last Visit to PCP Within last 3 months   Prior Functional Level Independent in ADLs/IADLs;Mobility   Current Functional Level Independent in ADLs/IADLs;Mobility   Can patient return to prior living arrangement Yes   Ability to make needs known: Good   Family able to assist with home care needs: No   Would you like for me to discuss the discharge plan with any other family members/significant others, and if so, who? No   Financial Resources [place] ([facility]   Community Resources None   Social/Functional History   Lives With Alone   Type of Home House   Home Layout One level   Home Access Stairs to enter with rails   Entrance Stairs - Number of Steps 2   Entrance Stairs - Rails Both   Bathroom Shower/Tub Tub/Shower unit;Shower chair with back   Bathroom Toilet Standard   Bathroom Equipment Shower chair   Bathroom Accessibility Accessible   Home Equipment Walker - Rolling;Wheelchair - Manual   Receives Help From Family   Prior Level of Assist for ADLs Independent   Prior Level of Assist for Homemaking Independent   Homemaking Responsibilities Yes   Ambulation Assistance Independent   Prior Level of Assist for Transfers Independent   Active  No   Patient's  Info Brother drives him around   Mode of Transportation Car   Occupation Retired   Discharge Planning   Type of Residence House   Living Arrangements Alone   [facility]mission [facility]   Potential Assistance Needed N/A   DME Ordered? No   Potential Assistance Purchasing Medications No   Type of Home Care Services Aide Services   Patient expects to be discharged to: House   One/Two Story Residence One story   History of falls? 0   Services At/After Discharge

## 2025-04-18 LAB
ALBUMIN SERPL-MCNC: 1.3 G/DL (ref 3.4–5)
ALBUMIN/GLOB SERPL: 0.3 (ref 0.8–1.7)
ALP SERPL-CCNC: 206 U/L (ref 45–117)
ALT SERPL-CCNC: 94 U/L (ref 16–61)
ANION GAP SERPL CALC-SCNC: 11 MMOL/L (ref 3–18)
AST SERPL-CCNC: 338 U/L (ref 10–38)
BACTERIA SPEC CULT: NORMAL
BACTERIA SPEC CULT: NORMAL
BASOPHILS # BLD: 0 K/UL (ref 0–0.1)
BASOPHILS NFR BLD: 0 % (ref 0–2)
BILIRUB SERPL-MCNC: 2.1 MG/DL (ref 0.2–1)
BUN SERPL-MCNC: 15 MG/DL (ref 7–18)
BUN/CREAT SERPL: 18 (ref 12–20)
CALCIUM SERPL-MCNC: 7.9 MG/DL (ref 8.5–10.1)
CHLORIDE SERPL-SCNC: 104 MMOL/L (ref 100–111)
CO2 SERPL-SCNC: 21 MMOL/L (ref 21–32)
CREAT SERPL-MCNC: 0.82 MG/DL (ref 0.6–1.3)
DIFFERENTIAL METHOD BLD: ABNORMAL
EOSINOPHIL # BLD: 0 K/UL (ref 0–0.4)
EOSINOPHIL NFR BLD: 0 % (ref 0–5)
ERYTHROCYTE [DISTWIDTH] IN BLOOD BY AUTOMATED COUNT: 27.3 % (ref 11.6–14.5)
GLOBULIN SER CALC-MCNC: 4.9 G/DL (ref 2–4)
GLUCOSE BLD STRIP.AUTO-MCNC: 105 MG/DL (ref 70–110)
GLUCOSE BLD STRIP.AUTO-MCNC: 108 MG/DL (ref 70–110)
GLUCOSE BLD STRIP.AUTO-MCNC: 115 MG/DL (ref 70–110)
GLUCOSE BLD STRIP.AUTO-MCNC: 117 MG/DL (ref 70–110)
GLUCOSE SERPL-MCNC: 108 MG/DL (ref 74–99)
HCT VFR BLD AUTO: 31.6 % (ref 36–48)
HGB BLD-MCNC: 9.7 G/DL (ref 13–16)
IMM GRANULOCYTES # BLD AUTO: 0 K/UL (ref 0–0.04)
IMM GRANULOCYTES NFR BLD AUTO: 0 % (ref 0–0.5)
LYMPHOCYTES # BLD: 0.39 K/UL (ref 0.9–3.6)
LYMPHOCYTES NFR BLD: 2 % (ref 21–52)
MCH RBC QN AUTO: 26.1 PG (ref 24–34)
MCHC RBC AUTO-ENTMCNC: 30.7 G/DL (ref 31–37)
MCV RBC AUTO: 85.2 FL (ref 78–100)
MONOCYTES # BLD: 0.97 K/UL (ref 0.05–1.2)
MONOCYTES NFR BLD: 5 % (ref 3–10)
NEUTS BAND NFR BLD MANUAL: 1 %
NEUTS SEG # BLD: 17.94 K/UL (ref 1.8–8)
NEUTS SEG NFR BLD: 92 % (ref 40–73)
NRBC # BLD: 0.45 K/UL (ref 0–0.01)
NRBC BLD-RTO: 2.3 PER 100 WBC
PLATELET # BLD AUTO: 340 K/UL (ref 135–420)
PLATELET COMMENT: ABNORMAL
PMV BLD AUTO: 10.9 FL (ref 9.2–11.8)
POTASSIUM SERPL-SCNC: 3.8 MMOL/L (ref 3.5–5.5)
PROT SERPL-MCNC: 6.2 G/DL (ref 6.4–8.2)
RBC # BLD AUTO: 3.71 M/UL (ref 4.35–5.65)
RBC MORPH BLD: ABNORMAL
SERVICE CMNT-IMP: NORMAL
SODIUM SERPL-SCNC: 136 MMOL/L (ref 136–145)
VANCOMYCIN SERPL-MCNC: 27.9 UG/ML (ref 5–40)
WBC # BLD AUTO: 19.3 K/UL (ref 4.6–13.2)

## 2025-04-18 PROCEDURE — 6360000002 HC RX W HCPCS: Performed by: STUDENT IN AN ORGANIZED HEALTH CARE EDUCATION/TRAINING PROGRAM

## 2025-04-18 PROCEDURE — 2500000003 HC RX 250 WO HCPCS: Performed by: STUDENT IN AN ORGANIZED HEALTH CARE EDUCATION/TRAINING PROGRAM

## 2025-04-18 PROCEDURE — 85025 COMPLETE CBC W/AUTO DIFF WBC: CPT

## 2025-04-18 PROCEDURE — 99223 1ST HOSP IP/OBS HIGH 75: CPT | Performed by: INTERNAL MEDICINE

## 2025-04-18 PROCEDURE — 6370000000 HC RX 637 (ALT 250 FOR IP): Performed by: STUDENT IN AN ORGANIZED HEALTH CARE EDUCATION/TRAINING PROGRAM

## 2025-04-18 PROCEDURE — 2580000003 HC RX 258: Performed by: STUDENT IN AN ORGANIZED HEALTH CARE EDUCATION/TRAINING PROGRAM

## 2025-04-18 PROCEDURE — 97535 SELF CARE MNGMENT TRAINING: CPT

## 2025-04-18 PROCEDURE — 97530 THERAPEUTIC ACTIVITIES: CPT

## 2025-04-18 PROCEDURE — 82962 GLUCOSE BLOOD TEST: CPT

## 2025-04-18 PROCEDURE — 99232 SBSQ HOSP IP/OBS MODERATE 35: CPT | Performed by: STUDENT IN AN ORGANIZED HEALTH CARE EDUCATION/TRAINING PROGRAM

## 2025-04-18 PROCEDURE — 1100000003 HC PRIVATE W/ TELEMETRY

## 2025-04-18 PROCEDURE — 80053 COMPREHEN METABOLIC PANEL: CPT

## 2025-04-18 PROCEDURE — 36415 COLL VENOUS BLD VENIPUNCTURE: CPT

## 2025-04-18 PROCEDURE — 94760 N-INVAS EAR/PLS OXIMETRY 1: CPT

## 2025-04-18 PROCEDURE — 6360000002 HC RX W HCPCS: Performed by: INTERNAL MEDICINE

## 2025-04-18 PROCEDURE — 80202 ASSAY OF VANCOMYCIN: CPT

## 2025-04-18 RX ORDER — VANCOMYCIN 1.5 G/300ML
1500 INJECTION, SOLUTION INTRAVENOUS EVERY 24 HOURS
Status: DISCONTINUED | OUTPATIENT
Start: 2025-04-18 | End: 2025-04-19

## 2025-04-18 RX ADMIN — PIPERACILLIN AND TAZOBACTAM 3375 MG: 3; .375 INJECTION, POWDER, LYOPHILIZED, FOR SOLUTION INTRAVENOUS at 23:41

## 2025-04-18 RX ADMIN — PIPERACILLIN AND TAZOBACTAM 3375 MG: 3; .375 INJECTION, POWDER, LYOPHILIZED, FOR SOLUTION INTRAVENOUS at 00:33

## 2025-04-18 RX ADMIN — SODIUM CHLORIDE, PRESERVATIVE FREE 10 ML: 5 INJECTION INTRAVENOUS at 10:13

## 2025-04-18 RX ADMIN — GUAIFENESIN 1200 MG: 600 TABLET, EXTENDED RELEASE ORAL at 08:56

## 2025-04-18 RX ADMIN — PIPERACILLIN AND TAZOBACTAM 3375 MG: 3; .375 INJECTION, POWDER, LYOPHILIZED, FOR SOLUTION INTRAVENOUS at 17:07

## 2025-04-18 RX ADMIN — PIPERACILLIN AND TAZOBACTAM 3375 MG: 3; .375 INJECTION, POWDER, LYOPHILIZED, FOR SOLUTION INTRAVENOUS at 09:17

## 2025-04-18 RX ADMIN — OXYCODONE HYDROCHLORIDE 5 MG: 5 TABLET ORAL at 00:30

## 2025-04-18 RX ADMIN — SODIUM CHLORIDE, PRESERVATIVE FREE 10 ML: 5 INJECTION INTRAVENOUS at 20:40

## 2025-04-18 RX ADMIN — GUAIFENESIN 1200 MG: 600 TABLET, EXTENDED RELEASE ORAL at 20:36

## 2025-04-18 RX ADMIN — ENOXAPARIN SODIUM 40 MG: 100 INJECTION SUBCUTANEOUS at 09:18

## 2025-04-18 RX ADMIN — OXYCODONE HYDROCHLORIDE 5 MG: 5 TABLET ORAL at 08:50

## 2025-04-18 RX ADMIN — OXYCODONE HYDROCHLORIDE 5 MG: 5 TABLET ORAL at 17:20

## 2025-04-18 RX ADMIN — VANCOMYCIN 1500 MG: 1.5 INJECTION, SOLUTION INTRAVENOUS at 18:59

## 2025-04-18 RX ADMIN — DILTIAZEM HYDROCHLORIDE 240 MG: 240 CAPSULE, EXTENDED RELEASE ORAL at 08:54

## 2025-04-18 ASSESSMENT — PAIN DESCRIPTION - LOCATION
LOCATION: BACK
LOCATION: BACK
LOCATION: BACK;PERINEUM

## 2025-04-18 ASSESSMENT — PAIN DESCRIPTION - DESCRIPTORS
DESCRIPTORS: THROBBING;SORE;DISCOMFORT
DESCRIPTORS: TIGHTNESS;SHARP
DESCRIPTORS: ACHING

## 2025-04-18 ASSESSMENT — PAIN SCALES - GENERAL
PAINLEVEL_OUTOF10: 0
PAINLEVEL_OUTOF10: 6
PAINLEVEL_OUTOF10: 6
PAINLEVEL_OUTOF10: 2
PAINLEVEL_OUTOF10: 0
PAINLEVEL_OUTOF10: 0
PAINLEVEL_OUTOF10: 3
PAINLEVEL_OUTOF10: 0
PAINLEVEL_OUTOF10: 0

## 2025-04-18 ASSESSMENT — PAIN - FUNCTIONAL ASSESSMENT
PAIN_FUNCTIONAL_ASSESSMENT: PREVENTS OR INTERFERES SOME ACTIVE ACTIVITIES AND ADLS
PAIN_FUNCTIONAL_ASSESSMENT: PREVENTS OR INTERFERES WITH ALL ACTIVE AND SOME PASSIVE ACTIVITIES
PAIN_FUNCTIONAL_ASSESSMENT: PREVENTS OR INTERFERES SOME ACTIVE ACTIVITIES AND ADLS

## 2025-04-18 ASSESSMENT — PAIN DESCRIPTION - ONSET
ONSET: ON-GOING
ONSET: ON-GOING
ONSET: PROGRESSIVE

## 2025-04-18 ASSESSMENT — PAIN DESCRIPTION - PAIN TYPE
TYPE: ACUTE PAIN

## 2025-04-18 ASSESSMENT — PAIN DESCRIPTION - ORIENTATION
ORIENTATION: RIGHT
ORIENTATION: LOWER
ORIENTATION: ANTERIOR

## 2025-04-18 ASSESSMENT — PAIN DESCRIPTION - FREQUENCY
FREQUENCY: CONTINUOUS
FREQUENCY: INTERMITTENT
FREQUENCY: CONTINUOUS

## 2025-04-18 NOTE — PROGRESS NOTES
[facility] Hospitalist [facility]  Progress Note    Patient: Schuyler Philippe Age: 79 y.o. : 1945 MR#: [id number] SSN: [id number]  Date: 2025                 DVT Prophylaxis:  [x]Lovenox  []Hep SQ  []SCDs  []Coumadin   []On Heparin gtt []PO anticoagulant    Anticipated discharge: 2025 to home,pending pulmonology evaluation and clinical stability    Subjective:     Patient was seen and examined at the bedside.  He was on 1 L of supplemental oxygen.  He said that he feels really better than yesterday.  Shortness of breath has improved.  He still has right-sided chest pain which is aggravated by movement.    After examining him, I called his brother Mr. Siddhartha Philippe and updated him about the patient's condition and treatment plan.  All his questions were answered to the best of my knowledge.    Objective:   VS: BP (!) 125/58   Pulse 90   Temp 97.2 °F (36.2 °C) (Oral)   Resp 18   Ht 1.727 m (5' 7.99\")   Wt 86.6 kg (190 lb 14.7 oz)   SpO2 99%   BMI 29.04 kg/m²    Tmax/24hrs: Temp (24hrs), Av.1 °F (36.7 °C), Min:97.2 °F (36.2 °C), Max:99.2 °F (37.3 °C)    Intake/Output Summary (Last 24 hours) at 2025 1142  Last data filed at 2025 0751  Gross per 24 hour   Intake 480 ml   Output 1600 ml   Net -1120 ml        PHYSICAL EXAM  General Appearance: Appears frail; no acute distress  HENT: normocephalic/atraumatic, moist mucus membranes  Neck: No JVD, supple  Lungs: Faint inspiratory crackles auscultated over the right lung in the anterior lung fields  CV: Irregularly irregular rhythm  Abdomen: soft, nontender, nondistended  : no suprapubic tenderness   Extremities: Moves extremities spontaneously  Neuro: Alert and oriented  Skin: Warm and dry  Psych: appropriate mood and affect    Current Facility-Administered Medications   Medication Dose Route Frequency    vancomycin (VANCOCIN) 1500 mg in 300 mL IVPB  1,500 mg IntraVENous Q24H    piperacillin-tazobactam (ZOSYN)  allergy to penicillin itching however benefit outweighs risk at the moment,.  As needed Benadryl.  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  Pulmonary hygiene.  Antitussives.  - Palliative care following, appreciate assistance in care  -Walk test prior to discharge to assess home O2 needs.  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   Continue diltiazem for rate control  Resume apixaban if no further procedures are recommended by pulmonology     Anemia of chronic disease  -Hemoglobin stable.  Transfuse to keep hemoglobin greater than 7.  No signs symptoms of active bleed.  Iron studies reviewed.  B12 folate normal     Hyperlipidemia  -Statin     Hypertension   -Home meds.     type 2 diabetes  -.  A1c 7.1, very good control.  SSI, Accu-Cheks.        Please contact Dr. De Luna if there are any questions. Greater than 35 minutes were spent reviewing the patient's chart, obtaining a thorough history, performing a physical exam, placing orders and dictating this document.  Findings and plan were also discussed with the patient/patient's family and with RN.  Greater than 50% of the time was spent on direct patient care.         Signed By: [unfilled]     April 18, 2025 11:42 AM

## 2025-04-18 NOTE — PROGRESS NOTES
[facility]   Pharmacy Pharmacokinetic Monitoring Service - Vancomycin    Indication: HAP  Goal AUC/ZAHRAA: 400-600  Day of Therapy: 3  Additional Antimicrobials: pip-tazo    Pertinent Laboratory Values:   Wt Readings from Last 1 Encounters:   04/16/25 86.6 kg (190 lb 14.7 oz)     Temp Readings from Last 1 Encounters:   04/18/25 98.7 °F (37.1 °C) (Axillary)     Estimated Creatinine Clearance: 79 mL/min (based on SCr of 0.82 mg/dL).    Recent Labs     04/17/25  0149 04/18/25  0553   CREATININE 0.66 0.82   BUN 11 15   WBC 15.1* 19.3*     Pertinent Cultures:  Date Source Results   4/16 Pleural fluid pending   MRSA Nasal Swab: pending    Assessment:  Date Current Dose Level (mg/L) Timing of Level (h) AUC/ZAHRAA   4/16 2,000 mg x1 - - -   4/17 1,500 mg q12h - - -   4/18 1,500 mg q12h  1,500 mg q24h 27.9 9 985  515   Note: Serum concentrations collected for AUC dosing may appear elevated if collected in close proximity to the dose administered, this is not necessarily an indication of toxicity    Plan:  Decrease dose from 1,500 mg q12h to 1,500 mg q24h  No level ordered at this time  Pharmacy will continue to monitor patient and adjust therapy as indicated    Thank you for the consult,  Hernan Richter [facility]  4/18/2025

## 2025-04-18 NOTE — PLAN OF CARE
Problem: Occupational Therapy - Adult  Goal: By Discharge: Performs self-care activities at highest level of function for planned discharge setting.  See evaluation for individualized goals.  Description: Occupational Therapy Goals:  Initiated 4/16/2025 to be met within 7-10 days.    1.  Patient will perform grooming with modified independence sitting at EOB >5 minutes.   2.  Patient will perform bathing with contact guard assistance.  3.  Patient will perform lower body dressing  with minimal assistance.  4.  Patient will perform toilet transfers with minimal assistance  5.  Patient will perform all aspects of toileting with minimal assistance.  6.  Patient will participate in upper extremity therapeutic exercise/activities with modified independence for 8-10 minutes to increase strength/endurance for ADLs.    7.  Patient will utilize energy conservation techniques during functional activities with verbal and visual cues.    PLOF:  Independent with ADLs and mobility prior to about 5 weeks ago. He has been in/out of the hospital for the past month. Lives alone.     Outcome: Progressing   OCCUPATIONAL THERAPY TREATMENT    Patient: Schuyler Philippe (79 y.o. male)  Date: 4/18/2025  Diagnosis: Respiratory failure [J96.90]  Pleural effusion on right [J90] Pleural effusion on right      Precautions: Fall Risk,  ,  ,  ,  ,  ,  ,      Chart, occupational therapy assessment, plan of care, and goals were reviewed.  ASSESSMENT:  Pt presented supine in bed upon entry, on 1L O2NC, and declining OOB/EOB activity 2/2 increased stomach pains. He required MAX A rolling L/R to adjust chux pads and 2 person assist to scoot towards HOB for optimal bed postioning. Pt engaged in facial hygiene with S/U @ bed level. Pt was left with all needs within reach and RN present.  Progression toward goals:  []          Improving appropriately and progressing toward goals  [x]          Improving slowly and progressing toward goals  []          Not  None  Education Outcome: Continued education needed      Thank you for this referral.  LUTHER Aparicio  Minutes: 24

## 2025-04-18 NOTE — PLAN OF CARE
Problem: Physical Therapy - Adult  Goal: By Discharge: Performs mobility at highest level of function for planned discharge setting.  See evaluation for individualized goals.  Description: Physical Therapy Goals:  Initiated 4/16/2025 to be met within 7-10 days.    1.  Patient will move from supine to sit and sit to supine  in bed with modified independence.    2.  Patient will transfer from bed to chair and chair to bed with modified independence using the least restrictive device.  3.  Patient will perform sit to stand with modified independence.  4.  Patient will ambulate with modified independence for 150 feet with the least restrictive device.       PLOF: Independent with ADLs and mobility prior to about 5 weeks ago. He has been in/out of the hospital for the past month. Lives alone.       Outcome: Progressing     PHYSICAL THERAPY TREATMENT    Patient: Schuyler Philippe (79 y.o. male)  Date: 4/18/2025  Diagnosis: Respiratory failure [J96.90]  Pleural effusion on right [J90] Pleural effusion on right      Precautions: Fall Risk, General Precautions,  ,  ,  ,  ,  ,  ,      ASSESSMENT:  Pt resting in bed upon entering room, agreeable to limited PT treatment.  Pt is on 1L supplemental O2 through NC.  Pt required max encouragement to participate in bed mobility, MaxAx2 to scoot to HOB and then MaxA to roll R/L multiple times for changing of elana pads and to assist RN in bandage changes.  Pt notes pain throughout back and ribcage with rolling, pt notes this is his limiting factor.  Pt positioned comfortably and left with all needs met and RN present in room.        Progression toward goals:   []      Improving appropriately and progressing toward goals  [x]      Improving slowly and progressing toward goals  []      Not making progress toward goals and plan of care will be adjusted     PLAN:  Patient continues to benefit from skilled intervention to address the above impairments.  Continue treatment per established

## 2025-04-18 NOTE — CARE COORDINATION
DIONICIO called the patient  [facility] EXT[phone] to notify her of the patient wanting Home Health services.

## 2025-04-18 NOTE — CONSULTS
[facility]  Pulmonary, Critical Care, and Sleep Medicine    Initial Patient Consult    Name: Schuyler Philippe MRN: [id number]   : 1945 [facility]   Date: 2025        IMPRESSION:   Acute hypoxic respiratory failure likely multifactorial, with passive atelectasis due to R pleural effusion, lung metastases with noted increase in size.   Sepsis due to possible post obstructive pneumonia with leukocytosis and elevated PCT.  R pleural effusion, exudate by Light's criteria, likely parapneumonic. 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 Would repeat tap if increased symptoms explained by fluid reaccummulation.  Agree with resuming Eliquis as no further invasive interventions planned  Continue empiric Zosyn/Vanco and streamline to cultures, NGTD. Trend fever curve and WBC  Encouraged incentive spirometry  Agree with DNR, and in light of widespread metastatic disease, would explore hospice option  Will follow with you. Thank you for consulting     Subjective:     This patient has been seen and evaluated at the request of Dr. De Luna for pleural effusion and acute hypoxic respiratory failure. Patient is a 79 y.o. male with colon cancer, metastatic to the lungs and liver, who was admitted 4/15/2025 for productive cough, anorexia, malaise and diagnosed with pneumonia and R effusion. Was found to have moderate R effusion. Thoracentesis yielded 1.2 L of fluid, exudate by Light's criteria. Pt states dyspnea is much improved post tap but still complains of malaise and poor appetite.       Past Medical History:   Diagnosis Date    A-fib (HCC)

## 2025-04-18 NOTE — PROGRESS NOTES
Bedside shift change report given to TERESA Lane  (oncoming nurse) by TERESA Orta  (offgoing nurse). Report included the following information Nurse Handoff Report, Recent Results, Med Rec Status, Cardiac Rhythm Afib , Neuro Assessment, and Event Log.        4 Eyes Skin Assessment     NAME:  Schuyler Philippe  YOB: 1945  MEDICAL RECORD NUMBER:  [id number]    The patient is being assessed for  Shift Handoff    I agree that at least one RN has performed a thorough Head to Toe Skin Assessment on the patient. ALL assessment sites listed below have been assessed.      Areas assessed by both nurses:    Head, Face, Ears, Shoulders, Back, Chest, Arms, Elbows, Hands, Sacrum. Buttock, Coccyx, Ischium, and Legs. Feet and Heels        Does the Patient have a Wound? Yes wound(s) were present on assessment. LDA wound assessment was Initiated and completed by RN    Patient has thoracentesis site on back. No pressure injuries present.        Nate Prevention initiated by RN: Yes  Wound Care Orders initiated by RN: No    Pressure Injury (Stage 3,4, Unstageable, DTI, NWPT, and Complex wounds) if present, place Wound referral order by RN under : No    New Ostomies, if present place, Ostomy referral order under : No     Nurse 1 eSignature: Electronically signed by Azul Estrada RN on 4/18/25 at 6:28 AM EDT    **SHARE this note so that the co-signing nurse can place an eSignature**    Nurse 2 eSignature: TERESA Orta

## 2025-04-19 LAB
ANION GAP SERPL CALC-SCNC: 12 MMOL/L (ref 3–18)
BASOPHILS # BLD: 0 K/UL (ref 0–0.1)
BASOPHILS NFR BLD: 0 % (ref 0–2)
BUN SERPL-MCNC: 22 MG/DL (ref 7–18)
BUN/CREAT SERPL: 22 (ref 12–20)
CALCIUM SERPL-MCNC: 8.2 MG/DL (ref 8.5–10.1)
CHLORIDE SERPL-SCNC: 103 MMOL/L (ref 100–111)
CO2 SERPL-SCNC: 20 MMOL/L (ref 21–32)
CREAT SERPL-MCNC: 1.01 MG/DL (ref 0.6–1.3)
DIFFERENTIAL METHOD BLD: ABNORMAL
EOSINOPHIL # BLD: 0 K/UL (ref 0–0.4)
EOSINOPHIL NFR BLD: 0 % (ref 0–5)
ERYTHROCYTE [DISTWIDTH] IN BLOOD BY AUTOMATED COUNT: 27.2 % (ref 11.6–14.5)
GLUCOSE BLD STRIP.AUTO-MCNC: 105 MG/DL (ref 70–110)
GLUCOSE BLD STRIP.AUTO-MCNC: 142 MG/DL (ref 70–110)
GLUCOSE BLD STRIP.AUTO-MCNC: 170 MG/DL (ref 70–110)
GLUCOSE BLD STRIP.AUTO-MCNC: 175 MG/DL (ref 70–110)
GLUCOSE SERPL-MCNC: 103 MG/DL (ref 74–99)
HCT VFR BLD AUTO: 33.6 % (ref 36–48)
HGB BLD-MCNC: 10.5 G/DL (ref 13–16)
IMM GRANULOCYTES # BLD AUTO: 0 K/UL (ref 0–0.04)
IMM GRANULOCYTES NFR BLD AUTO: 0 % (ref 0–0.5)
LACTATE SERPL-SCNC: 2.7 MMOL/L (ref 0.4–2)
LYMPHOCYTES # BLD: 0.36 K/UL (ref 0.9–3.6)
LYMPHOCYTES NFR BLD: 2 % (ref 21–52)
MCH RBC QN AUTO: 26.6 PG (ref 24–34)
MCHC RBC AUTO-ENTMCNC: 31.3 G/DL (ref 31–37)
MCV RBC AUTO: 85.1 FL (ref 78–100)
MONOCYTES # BLD: 1.09 K/UL (ref 0.05–1.2)
MONOCYTES NFR BLD: 6 % (ref 3–10)
NEUTS SEG # BLD: 16.65 K/UL (ref 1.8–8)
NEUTS SEG NFR BLD: 92 % (ref 40–73)
NRBC # BLD: 0.48 K/UL (ref 0–0.01)
NRBC BLD-RTO: 2.7 PER 100 WBC
PLATELET # BLD AUTO: 374 K/UL (ref 135–420)
PLATELET COMMENT: ABNORMAL
PMV BLD AUTO: 10.7 FL (ref 9.2–11.8)
POTASSIUM SERPL-SCNC: 3.6 MMOL/L (ref 3.5–5.5)
RBC # BLD AUTO: 3.95 M/UL (ref 4.35–5.65)
RBC MORPH BLD: ABNORMAL
SODIUM SERPL-SCNC: 135 MMOL/L (ref 136–145)
WBC # BLD AUTO: 18.1 K/UL (ref 4.6–13.2)

## 2025-04-19 PROCEDURE — 6360000002 HC RX W HCPCS: Performed by: STUDENT IN AN ORGANIZED HEALTH CARE EDUCATION/TRAINING PROGRAM

## 2025-04-19 PROCEDURE — 94760 N-INVAS EAR/PLS OXIMETRY 1: CPT

## 2025-04-19 PROCEDURE — 2580000003 HC RX 258: Performed by: INTERNAL MEDICINE

## 2025-04-19 PROCEDURE — 6370000000 HC RX 637 (ALT 250 FOR IP): Performed by: STUDENT IN AN ORGANIZED HEALTH CARE EDUCATION/TRAINING PROGRAM

## 2025-04-19 PROCEDURE — 99232 SBSQ HOSP IP/OBS MODERATE 35: CPT | Performed by: STUDENT IN AN ORGANIZED HEALTH CARE EDUCATION/TRAINING PROGRAM

## 2025-04-19 PROCEDURE — 36415 COLL VENOUS BLD VENIPUNCTURE: CPT

## 2025-04-19 PROCEDURE — 6360000002 HC RX W HCPCS: Performed by: INTERNAL MEDICINE

## 2025-04-19 PROCEDURE — 82962 GLUCOSE BLOOD TEST: CPT

## 2025-04-19 PROCEDURE — 1100000003 HC PRIVATE W/ TELEMETRY

## 2025-04-19 PROCEDURE — 2500000003 HC RX 250 WO HCPCS: Performed by: STUDENT IN AN ORGANIZED HEALTH CARE EDUCATION/TRAINING PROGRAM

## 2025-04-19 PROCEDURE — 2580000003 HC RX 258: Performed by: STUDENT IN AN ORGANIZED HEALTH CARE EDUCATION/TRAINING PROGRAM

## 2025-04-19 PROCEDURE — 99232 SBSQ HOSP IP/OBS MODERATE 35: CPT | Performed by: INTERNAL MEDICINE

## 2025-04-19 PROCEDURE — 85025 COMPLETE CBC W/AUTO DIFF WBC: CPT

## 2025-04-19 PROCEDURE — 83605 ASSAY OF LACTIC ACID: CPT

## 2025-04-19 PROCEDURE — 80048 BASIC METABOLIC PNL TOTAL CA: CPT

## 2025-04-19 RX ADMIN — PIPERACILLIN AND TAZOBACTAM 3375 MG: 3; .375 INJECTION, POWDER, LYOPHILIZED, FOR SOLUTION INTRAVENOUS at 15:41

## 2025-04-19 RX ADMIN — SODIUM CHLORIDE, PRESERVATIVE FREE 10 ML: 5 INJECTION INTRAVENOUS at 20:34

## 2025-04-19 RX ADMIN — DILTIAZEM HYDROCHLORIDE 240 MG: 240 CAPSULE, EXTENDED RELEASE ORAL at 08:29

## 2025-04-19 RX ADMIN — GUAIFENESIN 1200 MG: 600 TABLET, EXTENDED RELEASE ORAL at 08:29

## 2025-04-19 RX ADMIN — APIXABAN 2.5 MG: 2.5 TABLET, FILM COATED ORAL at 12:12

## 2025-04-19 RX ADMIN — GUAIFENESIN 1200 MG: 600 TABLET, EXTENDED RELEASE ORAL at 20:34

## 2025-04-19 RX ADMIN — SODIUM CHLORIDE, PRESERVATIVE FREE 10 ML: 5 INJECTION INTRAVENOUS at 08:33

## 2025-04-19 RX ADMIN — APIXABAN 2.5 MG: 2.5 TABLET, FILM COATED ORAL at 20:34

## 2025-04-19 RX ADMIN — SODIUM CHLORIDE, PRESERVATIVE FREE 10 ML: 5 INJECTION INTRAVENOUS at 20:38

## 2025-04-19 RX ADMIN — VANCOMYCIN HYDROCHLORIDE 1250 MG: 10 INJECTION, POWDER, LYOPHILIZED, FOR SOLUTION INTRAVENOUS at 18:02

## 2025-04-19 RX ADMIN — PIPERACILLIN AND TAZOBACTAM 3375 MG: 3; .375 INJECTION, POWDER, LYOPHILIZED, FOR SOLUTION INTRAVENOUS at 08:32

## 2025-04-19 ASSESSMENT — PAIN SCALES - GENERAL
PAINLEVEL_OUTOF10: 0

## 2025-04-19 NOTE — PLAN OF CARE
Problem: Chronic Conditions and Co-morbidities  Goal: Patient's chronic conditions and co-morbidity symptoms are monitored and maintained or improved  Outcome: Progressing     Problem: Discharge Planning  Goal: Discharge to home or other facility with appropriate resources  Outcome: Progressing     Problem: Skin/Tissue Integrity  Goal: Skin integrity remains intact  Description: 1.  Monitor for areas of redness and/or skin breakdown2.  Assess vascular access sites hourly3.  Every 4-6 hours minimum:  Change oxygen saturation probe site4.  Every 4-6 hours:  If on nasal continuous positive airway pressure, respiratory therapy assess nares and determine need for appliance change or resting period  Outcome: Progressing     Problem: ABCDS Injury Assessment  Goal: Absence of physical injury  Outcome: Progressing     Problem: Safety - Adult  Goal: Free from fall injury  Outcome: Progressing     Problem: Pain  Goal: Verbalizes/displays adequate comfort level or baseline comfort level  Outcome: Progressing

## 2025-04-19 NOTE — PROGRESS NOTES
[facility]   Pharmacy Pharmacokinetic Monitoring Service - Vancomycin    Indication: HAP  Goal AUC/ZAHRAA: 400-600  Day of Therapy: 4  Additional Antimicrobials: pip-tazo    Pertinent Laboratory Values:   Wt Readings from Last 1 Encounters:   04/18/25 94.2 kg (207 lb 10.8 oz)     Temp Readings from Last 1 Encounters:   04/19/25 97.3 °F (36.3 °C) (Oral)     Estimated Creatinine Clearance: 67 mL/min (based on SCr of 1.01 mg/dL).    Recent Labs     04/18/25  0553 04/19/25  0456   CREATININE 0.82 1.01   BUN 15 22*   WBC 19.3* 18.1*     Pertinent Cultures:  Date Source Results   4/16 Pleural fluid pending   MRSA Nasal Swab: not present 4/16    Assessment:  Date Current Dose Level (mg/L) Timing of Level (h) AUC/ZAHRAA   4/16 2,000 mg x1 - - -   4/17 1,500 mg q12h - - -   4/18 1,500 mg q12h  1,500 mg q24h 27.9 9 985  515   4/19 1250 mg q24h      Note: Serum concentrations collected for AUC dosing may appear elevated if collected in close proximity to the dose administered, this is not necessarily an indication of toxicity    Plan:  Decrease dose from 1,500 mg q24h to 1250 mg q24h  Level ordered for tomorrow AM with labs  Pharmacy will continue to monitor patient and adjust therapy as indicated    Thank you for the consult,  Isrrael Andrea RPH  4/19/2025

## 2025-04-19 NOTE — PROGRESS NOTES
[facility]  Progress Note    Patient: Schuyler Philippe Age: 79 y.o. : 1945 MR#: [id number] SSN: [id number]  Date: 2025                 DVT Prophylaxis:  [x]Lovenox  []Hep SQ  []SCDs  []Coumadin   []On Heparin gtt []PO anticoagulant    Anticipated discharge: 2025 to [facility], pending clinical course    Subjective:     The patient was seen and examined at the bedside.  His brother and his friends were also in the room.  The patient was on room air.  He said that he feels drowsy but denied any shortness of breath, chest pain or cough.      Objective:   VS: /80   Pulse (!) 105   Temp 97.2 °F (36.2 °C) (Oral)   Resp 23   Ht 1.727 m (5' 7.99\")   Wt 94.2 kg (207 lb 10.8 oz)   SpO2 97%   BMI 31.58 kg/m²    Tmax/24hrs: Temp (24hrs), Av.4 °F (36.3 °C), Min:97.2 °F (36.2 °C), Max:97.6 °F (36.4 °C)    Intake/Output Summary (Last 24 hours) at 2025 1108  Last data filed at 2025 0906  Gross per 24 hour   Intake --   Output 350 ml   Net -350 ml        PHYSICAL EXAM  General Appearance: Appears frail; no acute distress  HENT: normocephalic/atraumatic, moist mucus membranes  Neck: No JVD, supple  Lungs: Diminished breath sounds over the right lung  CV: Irregularly irregular rhythm  Abdomen: soft, nontender, nondistended  : no suprapubic tenderness   Extremities: Moves extremities spontaneously; bilateral lower extremity edema  Neuro: Alert and oriented  Skin: Warm and dry  Psych: appropriate mood and affect    Current Facility-Administered Medications   Medication Dose Route Frequency    apixaban (ELIQUIS) tablet 2.5 mg  2.5 mg Oral BID    vancomycin (VANCOCIN) 1500 mg in 300 mL IVPB  1,500 mg IntraVENous Q24H    piperacillin-tazobactam (ZOSYN) 3,375 mg in sodium chloride 0.9 % 50 mL IVPB (addEASE)  3,375 mg IntraVENous Q8H    guaiFENesin (MUCINEX) extended release tablet 1,200 mg  1,200 mg Oral BID    diphenhydrAMINE (BENADRYL) capsule 25 mg

## 2025-04-19 NOTE — CARE COORDINATION
Spoke to patient and patient's brother Siddhartha about discharge disposition. They are wanting a skilled nursing facility in [place] if possible. Clinicals uploaded in [facility]. Informed Siddhartha once patient has accepting facilities case management will reach out to him for a choice on facility.     Emma DELACRUZN, RN   Case Management   [phone]

## 2025-04-19 NOTE — PROGRESS NOTES
Progress Note  Pulmonary, Critical Care, and Sleep Medicine    Name: Schuyler Philippe MRN: [id number]   : 1945 Hospital: [facility]   Date: 2025        IMPRESSION:   Acute hypoxic respiratory failure likely multifactorial, with passive atelectasis due to R pleural effusion, lung metastases with noted increase in size. Currently on RA  Sepsis due to possible post obstructive pneumonia with persistent leukocytosis and elevated PCT.  R pleural effusion, exudate by Light's criteria, likely parapneumonic. 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 Will continue to monitor  Continue empiric Zosyn/Vanco and streamline to cultures, NGTD. Trend fever curve and WBC  Encouraged incentive spirometry  DNR, and in light of widespread metastatic disease, would explore hospice option  Will follow with you. Thank you for consulting      Subjective/Interval History:   I have reviewed the flowsheet and previous day’s notes. Reviewed interval history. Discussed management with nursing staff.    25  Afebrile, VSS  No new respiratory symptoms      ROS:Pertinent items are noted in HPI.  Orders reviewed including medications. Changes made if indicated.   Telemetry monitor reviewed at the bedside.  Objective:   Vital Signs:    /80   Pulse (!) 105   Temp 97.2 °F (36.2 °C) (Oral)   Resp 23   Ht 1.727 m (5' 7.99\")   Wt 94.2 kg (207 lb 10.8 oz)   SpO2 97%   BMI 31.58 kg/m²             Temp (24hrs), Av.4 °F (36.3 °C), Min:97.2 °F (36.2 °C), Max:97.6 °F (36.4 °C)       Intake/Output:   Last shift:      701 -  1900  In: -   Out: 250 [Urine:250]  Last 3 shifts:  04/17 1901 - 04/19 0700  In: -   Out: 900 [Urine:800]    Intake/Output Summary (Last 24 hours) at 4/19/2025 1015  Last data filed at 4/19/2025 0906  Gross per 24 hour   Intake --   Output 350 ml   Net -350 ml        Physical Exam:    General:  Alert, cooperative, not in distress, appears stated age.   Head:  Normocephalic, without obvious abnormality, atraumatic.   Eyes:  Anicteric, PERRL,   Nose: Nares normal. Mucosa normal. No drainage or sinus tenderness.    Throat: Lips, mucosa, and tongue normal. Teeth and gums normal    Neck: Supple, symmetrical, trachea midline, no adenopathy, thyroid: no enlargment/tenderness/nodules    Back:   Symmetric    Lungs:   Bilateral auscultation no rales or wheezes   Chest wall:  No tenderness or deformity. NO intercostal retractions   Heart:  Regular rate and rhythm, S1, S2 normal, no murmur, click    Abdomen:   Soft, non-tender. Bowel sounds normal. No masses,  hepatomegaly    Extremities: normal, atraumatic, no cyanosis or edema.   Pulses: 2+ and symmetric all extremities.   Skin: Skin color, texture, turgor normal. No rashes or lesions. NO clubbing   Lymph nodes: Cervical, supraclavicular nodes normal.   Neurologic: Grossly nonfocal      :        Devices:             Drips:    DATA:  Labs:  Recent Labs     04/17/25  0149 04/18/25  0553 04/19/25  0456   WBC 15.1* 19.3* 18.1*   HGB 10.4* 9.7* 10.5*   HCT 33.2* 31.6* 33.6*    340 374     Recent Labs     04/17/25  0149 04/18/25  0553 04/19/25  0456   * 136 135*   K 3.9 3.8 3.6    104 103   CO2 20* 21 20*   BUN 11 15 22*   ALT 95* 94*  --      No results for input(s): \"PH\", \"PCO2\", \"PO2\", \"HCO3\", \"FIO2\" in the last 72 hours.    Imaging:  []        I have personally reviewed the patient’s radiographs and reports  []         []        No change from prior, tubes and lines in adequate position  []        Improved   []        Worsening  High complexity decision making was performed during this consultation and

## 2025-04-20 ENCOUNTER — APPOINTMENT (OUTPATIENT)
Facility: HOSPITAL | Age: 80
End: 2025-04-20
Attending: FAMILY MEDICINE
Payer: OTHER GOVERNMENT

## 2025-04-20 ENCOUNTER — APPOINTMENT (OUTPATIENT)
Facility: HOSPITAL | Age: 80
End: 2025-04-20
Attending: STUDENT IN AN ORGANIZED HEALTH CARE EDUCATION/TRAINING PROGRAM
Payer: OTHER GOVERNMENT

## 2025-04-20 LAB
ALBUMIN SERPL-MCNC: 1.3 G/DL (ref 3.4–5)
ALBUMIN/GLOB SERPL: 0.3 (ref 0.8–1.7)
ALP SERPL-CCNC: 219 U/L (ref 45–117)
ALT SERPL-CCNC: 89 U/L (ref 16–61)
ANION GAP SERPL CALC-SCNC: 12 MMOL/L (ref 3–18)
AST SERPL-CCNC: 314 U/L (ref 10–38)
BACTERIA SPEC CULT: NORMAL
BASOPHILS # BLD: 0 K/UL (ref 0–0.1)
BASOPHILS NFR BLD: 0 % (ref 0–2)
BILIRUB SERPL-MCNC: 2.4 MG/DL (ref 0.2–1)
BUN SERPL-MCNC: 26 MG/DL (ref 7–18)
BUN/CREAT SERPL: 22 (ref 12–20)
CALCIUM SERPL-MCNC: 8.6 MG/DL (ref 8.5–10.1)
CHLORIDE SERPL-SCNC: 103 MMOL/L (ref 100–111)
CO2 SERPL-SCNC: 19 MMOL/L (ref 21–32)
CREAT SERPL-MCNC: 1.18 MG/DL (ref 0.6–1.3)
DIFFERENTIAL METHOD BLD: ABNORMAL
ECHO AO ASC DIAM: 4.1 CM
ECHO AO ASCENDING AORTA INDEX: 2 CM/M2
ECHO AO ROOT DIAM: 3.4 CM
ECHO AO ROOT INDEX: 1.66 CM/M2
ECHO AR MAX VEL PISA: 3 M/S
ECHO AV REGURGITANT PHT: 573 MS
ECHO BSA: 2.11 M2
ECHO EST RA PRESSURE: 3 MMHG
ECHO LA VOL A-L A2C: 57 ML (ref 18–58)
ECHO LA VOL A-L A4C: 67 ML (ref 18–58)
ECHO LA VOL BP: 67 ML (ref 18–58)
ECHO LA VOL MOD A2C: 53 ML (ref 18–58)
ECHO LA VOL MOD A4C: 63 ML (ref 18–58)
ECHO LA VOL/BSA BIPLANE: 33 ML/M2 (ref 16–34)
ECHO LA VOLUME AREA LENGTH: 72 ML
ECHO LA VOLUME INDEX A-L A2C: 28 ML/M2 (ref 16–34)
ECHO LA VOLUME INDEX A-L A4C: 33 ML/M2 (ref 16–34)
ECHO LA VOLUME INDEX AREA LENGTH: 35 ML/M2 (ref 16–34)
ECHO LA VOLUME INDEX MOD A2C: 26 ML/M2 (ref 16–34)
ECHO LA VOLUME INDEX MOD A4C: 31 ML/M2 (ref 16–34)
ECHO LV EF PHYSICIAN: 60 %
ECHO LV FRACTIONAL SHORTENING: 26 % (ref 28–44)
ECHO LV INTERNAL DIMENSION DIASTOLE INDEX: 1.66 CM/M2
ECHO LV INTERNAL DIMENSION DIASTOLIC: 3.4 CM (ref 4.2–5.9)
ECHO LV INTERNAL DIMENSION SYSTOLIC INDEX: 1.22 CM/M2
ECHO LV INTERNAL DIMENSION SYSTOLIC: 2.5 CM
ECHO LV IVSD: 1.1 CM (ref 0.6–1)
ECHO LV MASS 2D: 98.9 G (ref 88–224)
ECHO LV MASS INDEX 2D: 48.2 G/M2 (ref 49–115)
ECHO LV POSTERIOR WALL DIASTOLIC: 0.9 CM (ref 0.6–1)
ECHO LV RELATIVE WALL THICKNESS RATIO: 0.53
ECHO LVOT AREA: 3.1 CM2
ECHO LVOT DIAM: 2 CM
ECHO LVOT MEAN GRADIENT: 3 MMHG
ECHO LVOT PEAK GRADIENT: 5 MMHG
ECHO LVOT PEAK VELOCITY: 1.1 M/S
ECHO LVOT STROKE VOLUME INDEX: 23.7 ML/M2
ECHO LVOT SV: 48.7 ML
ECHO LVOT VTI: 15.5 CM
ECHO MV AREA PHT: 4 CM2
ECHO MV AREA VTI: 2.5 CM2
ECHO MV LVOT VTI INDEX: 1.28
ECHO MV MAX VELOCITY: 1 M/S
ECHO MV MEAN GRADIENT: 2 MMHG
ECHO MV MEAN VELOCITY: 0.6 M/S
ECHO MV PEAK GRADIENT: 4 MMHG
ECHO MV PRESSURE HALF TIME (PHT): 54.8 MS
ECHO MV VTI: 19.8 CM
ECHO RIGHT VENTRICULAR SYSTOLIC PRESSURE (RVSP): 33 MMHG
ECHO TV REGURGITANT MAX VELOCITY: 2.76 M/S
ECHO TV REGURGITANT PEAK GRADIENT: 30 MMHG
EOSINOPHIL # BLD: 0 K/UL (ref 0–0.4)
EOSINOPHIL NFR BLD: 0 % (ref 0–5)
ERYTHROCYTE [DISTWIDTH] IN BLOOD BY AUTOMATED COUNT: 27.8 % (ref 11.6–14.5)
GLOBULIN SER CALC-MCNC: 5.2 G/DL (ref 2–4)
GLUCOSE BLD STRIP.AUTO-MCNC: 134 MG/DL (ref 70–110)
GLUCOSE BLD STRIP.AUTO-MCNC: 142 MG/DL (ref 70–110)
GLUCOSE BLD STRIP.AUTO-MCNC: 150 MG/DL (ref 70–110)
GLUCOSE BLD STRIP.AUTO-MCNC: 178 MG/DL (ref 70–110)
GLUCOSE SERPL-MCNC: 119 MG/DL (ref 74–99)
GRAM STN SPEC: NORMAL
HCT VFR BLD AUTO: 32.7 % (ref 36–48)
HGB BLD-MCNC: 10 G/DL (ref 13–16)
IMM GRANULOCYTES # BLD AUTO: 0 K/UL (ref 0–0.04)
IMM GRANULOCYTES NFR BLD AUTO: 0 % (ref 0–0.5)
LYMPHOCYTES # BLD: 0.87 K/UL (ref 0.9–3.6)
LYMPHOCYTES NFR BLD: 5 % (ref 21–52)
Lab: NORMAL
Lab: NORMAL
MCH RBC QN AUTO: 25.9 PG (ref 24–34)
MCHC RBC AUTO-ENTMCNC: 30.6 G/DL (ref 31–37)
MCV RBC AUTO: 84.7 FL (ref 78–100)
MONOCYTES # BLD: 0.35 K/UL (ref 0.05–1.2)
MONOCYTES NFR BLD: 2 % (ref 3–10)
NEUTS SEG # BLD: 16.18 K/UL (ref 1.8–8)
NEUTS SEG NFR BLD: 93 % (ref 40–73)
NRBC # BLD: 0.39 K/UL (ref 0–0.01)
NRBC BLD-RTO: 2.2 PER 100 WBC
PLATELET # BLD AUTO: 397 K/UL (ref 135–420)
PLATELET COMMENT: ABNORMAL
PMV BLD AUTO: 11.1 FL (ref 9.2–11.8)
POTASSIUM SERPL-SCNC: 3.5 MMOL/L (ref 3.5–5.5)
PROT SERPL-MCNC: 6.5 G/DL (ref 6.4–8.2)
RBC # BLD AUTO: 3.86 M/UL (ref 4.35–5.65)
RBC MORPH BLD: ABNORMAL
RBC MORPH BLD: ABNORMAL
SERVICE CMNT-IMP: NORMAL
SODIUM SERPL-SCNC: 134 MMOL/L (ref 136–145)
VANCOMYCIN SERPL-MCNC: 34.1 UG/ML (ref 5–40)
WBC # BLD AUTO: 17.4 K/UL (ref 4.6–13.2)

## 2025-04-20 PROCEDURE — 99232 SBSQ HOSP IP/OBS MODERATE 35: CPT | Performed by: INTERNAL MEDICINE

## 2025-04-20 PROCEDURE — 6360000002 HC RX W HCPCS: Performed by: STUDENT IN AN ORGANIZED HEALTH CARE EDUCATION/TRAINING PROGRAM

## 2025-04-20 PROCEDURE — 6370000000 HC RX 637 (ALT 250 FOR IP): Performed by: STUDENT IN AN ORGANIZED HEALTH CARE EDUCATION/TRAINING PROGRAM

## 2025-04-20 PROCEDURE — 36415 COLL VENOUS BLD VENIPUNCTURE: CPT

## 2025-04-20 PROCEDURE — 1100000003 HC PRIVATE W/ TELEMETRY

## 2025-04-20 PROCEDURE — 2580000003 HC RX 258: Performed by: STUDENT IN AN ORGANIZED HEALTH CARE EDUCATION/TRAINING PROGRAM

## 2025-04-20 PROCEDURE — 82962 GLUCOSE BLOOD TEST: CPT

## 2025-04-20 PROCEDURE — 2500000003 HC RX 250 WO HCPCS: Performed by: STUDENT IN AN ORGANIZED HEALTH CARE EDUCATION/TRAINING PROGRAM

## 2025-04-20 PROCEDURE — C8929 TTE W OR WO FOL WCON,DOPPLER: HCPCS

## 2025-04-20 PROCEDURE — 80053 COMPREHEN METABOLIC PANEL: CPT

## 2025-04-20 PROCEDURE — 71045 X-RAY EXAM CHEST 1 VIEW: CPT

## 2025-04-20 PROCEDURE — 6360000004 HC RX CONTRAST MEDICATION: Performed by: STUDENT IN AN ORGANIZED HEALTH CARE EDUCATION/TRAINING PROGRAM

## 2025-04-20 PROCEDURE — 85025 COMPLETE CBC W/AUTO DIFF WBC: CPT

## 2025-04-20 PROCEDURE — 99232 SBSQ HOSP IP/OBS MODERATE 35: CPT | Performed by: STUDENT IN AN ORGANIZED HEALTH CARE EDUCATION/TRAINING PROGRAM

## 2025-04-20 PROCEDURE — 80202 ASSAY OF VANCOMYCIN: CPT

## 2025-04-20 PROCEDURE — 93306 TTE W/DOPPLER COMPLETE: CPT | Performed by: INTERNAL MEDICINE

## 2025-04-20 RX ORDER — SODIUM CHLORIDE, SODIUM LACTATE, POTASSIUM CHLORIDE, AND CALCIUM CHLORIDE .6; .31; .03; .02 G/100ML; G/100ML; G/100ML; G/100ML
250 INJECTION, SOLUTION INTRAVENOUS ONCE
Status: DISCONTINUED | OUTPATIENT
Start: 2025-04-20 | End: 2025-04-20

## 2025-04-20 RX ORDER — SERTRALINE HYDROCHLORIDE 25 MG/1
25 TABLET, FILM COATED ORAL NIGHTLY
Status: DISCONTINUED | OUTPATIENT
Start: 2025-04-20 | End: 2025-05-02 | Stop reason: HOSPADM

## 2025-04-20 RX ADMIN — PIPERACILLIN AND TAZOBACTAM 3375 MG: 3; .375 INJECTION, POWDER, LYOPHILIZED, FOR SOLUTION INTRAVENOUS at 00:50

## 2025-04-20 RX ADMIN — PIPERACILLIN AND TAZOBACTAM 3375 MG: 3; .375 INJECTION, POWDER, LYOPHILIZED, FOR SOLUTION INTRAVENOUS at 16:22

## 2025-04-20 RX ADMIN — SODIUM CHLORIDE, PRESERVATIVE FREE 10 ML: 5 INJECTION INTRAVENOUS at 20:43

## 2025-04-20 RX ADMIN — PIPERACILLIN AND TAZOBACTAM 3375 MG: 3; .375 INJECTION, POWDER, LYOPHILIZED, FOR SOLUTION INTRAVENOUS at 09:22

## 2025-04-20 RX ADMIN — APIXABAN 2.5 MG: 2.5 TABLET, FILM COATED ORAL at 09:23

## 2025-04-20 RX ADMIN — GUAIFENESIN 1200 MG: 600 TABLET, EXTENDED RELEASE ORAL at 20:43

## 2025-04-20 RX ADMIN — SULFUR HEXAFLUORIDE 2 ML: KIT at 15:34

## 2025-04-20 RX ADMIN — APIXABAN 2.5 MG: 2.5 TABLET, FILM COATED ORAL at 20:43

## 2025-04-20 RX ADMIN — DILTIAZEM HYDROCHLORIDE 240 MG: 240 CAPSULE, EXTENDED RELEASE ORAL at 09:23

## 2025-04-20 RX ADMIN — SERTRALINE HYDROCHLORIDE 25 MG: 25 TABLET ORAL at 20:43

## 2025-04-20 RX ADMIN — GUAIFENESIN 1200 MG: 600 TABLET, EXTENDED RELEASE ORAL at 09:22

## 2025-04-20 RX ADMIN — SODIUM CHLORIDE, SODIUM LACTATE, POTASSIUM CHLORIDE, AND CALCIUM CHLORIDE 250 ML: .6; .31; .03; .02 INJECTION, SOLUTION INTRAVENOUS at 10:53

## 2025-04-20 RX ADMIN — SODIUM CHLORIDE, PRESERVATIVE FREE 10 ML: 5 INJECTION INTRAVENOUS at 10:54

## 2025-04-20 ASSESSMENT — PAIN SCALES - GENERAL
PAINLEVEL_OUTOF10: 0

## 2025-04-20 NOTE — PROGRESS NOTES
Progress Note  Pulmonary, Critical Care, and Sleep Medicine    Name: Schuyler Philippe MRN: [id number]   : 1945 Hospital: [facility]   Date: 2025        IMPRESSION:   Acute hypoxic respiratory failure likely multifactorial, with passive atelectasis due to R pleural effusion, lung metastases with noted growth. Currently on RA  Sepsis due to possible post obstructive pneumonia with persistent leukocytosis and elevated PCT.  R pleural effusion, exudate by Light's criteria, likely parapneumonic. 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    Continue empiric Zosyn/Vanco and streamline to cultures, NGTD. Trend fever curve and WBC  Encouraged incentive spirometry  DNR, and in light of widespread metastatic disease, would recommend hospice consult  No further PCCM recommendations, will S/o case but will be available as needed.     Subjective/Interval History:   I have reviewed the flowsheet and previous day’s notes. Reviewed interval history. Discussed management with nursing staff.    25  Afebrile, VSS  No new respiratory symptoms  + anorexia       ROS:Pertinent items are noted in HPI.  Orders reviewed including medications. Changes made if indicated.   Telemetry monitor reviewed at the bedside.  Objective:   Vital Signs:    BP (!) 126/55   Pulse 96   Temp 97.3 °F (36.3 °C) (Oral)   Resp 27   Ht 1.727 m (5' 7.99\")   Wt 94.2 kg (207 lb 10.8 oz)   SpO2 95%   BMI 31.58 kg/m²             Temp (24hrs), Av.5 °F (36.4 °C), Min:97 °F (36.1 °C), Max:98.2 °F (36.8 °C)       Intake/Output:   Last shift:      No intake/output data recorded.  Last 3 shifts: 1901

## 2025-04-20 NOTE — CARE COORDINATION
Patient accepted by multiple facilities.  Family choice for [facility].  Will need SNF authorization via [facility].  [facility] office currently closed, however MSW [facility] to [facility] @ [phone]. [phone].  Authorization updates pending at this time.      [place] LMSW   Case Management

## 2025-04-20 NOTE — PROGRESS NOTES
[facility]  Progress Note    Patient: Schuyler Philippe Age: 79 y.o. : 1945 MR#: [id number] SSN: [id number]  Date: 2025                 DVT Prophylaxis:  [x]Lovenox  []Hep SQ  []SCDs  []Coumadin   []On Heparin gtt []PO anticoagulant    Anticipated discharge: 2025 to [facility], pending clinical course    Subjective:     The patient was seen and examined at the bedside with TERESA Payne.  Patient was resting comfortably and was on room air.  However, he was slightly tachypneic.  He denied any chest pain or shortness of breath.  However, his abdomen appears slightly distended and he had mild abdominal tenderness on palpation of the right upper quadrant.  A small amount of bright green output was noted in his ostomy.    Repeat chest x-ray and an ultrasound of the abdomen have been ordered.  After examining him, I spoke to his brother Mr. Siddhartha Philippe on the phone and updated him.  The patient's brother said that the patient had called him this morning and that he sounded irritable on the phone.  He stated that the patient has a history of PTSD and asked if he could be started on a medication to \"calm him down\".      Objective:   VS: BP (!) 126/55   Pulse 96   Temp 97.3 °F (36.3 °C) (Oral)   Resp 27   Ht 1.727 m (5' 7.99\")   Wt 94.2 kg (207 lb 10.8 oz)   SpO2 95%   BMI 31.58 kg/m²    Tmax/24hrs: Temp (24hrs), Av.5 °F (36.4 °C), Min:97 °F (36.1 °C), Max:98.2 °F (36.8 °C)    Intake/Output Summary (Last 24 hours) at 2025 1132  Last data filed at 2025 0500  Gross per 24 hour   Intake --   Output 725 ml   Net -725 ml        PHYSICAL EXAM  General Appearance: Appears frail; no acute distress  HENT: normocephalic/atraumatic, moist mucus membranes  Neck: No JVD, supple  Lungs: Diminished breath sounds over the right lung  CV: Irregularly irregular rhythm  Abdomen: soft, slightly distended with mild tenderness in the right upper quadrant; colostomy

## 2025-04-20 NOTE — PLAN OF CARE
Problem: Chronic Conditions and Co-morbidities  Goal: Patient's chronic conditions and co-morbidity symptoms are monitored and maintained or improved  4/19/2025 2054 by Mahesh Gambino RN  Outcome: Progressing  Flowsheets (Taken 4/19/2025 2054)  Care Plan - Patient's Chronic Conditions and Co-Morbidity Symptoms are Monitored and Maintained or Improved:   Monitor and assess patient's chronic conditions and comorbid symptoms for stability, deterioration, or improvement   Collaborate with multidisciplinary team to address chronic and comorbid conditions and prevent exacerbation or deterioration  Note: Pt. Monitor for discomfort and pain.  Pt. Denies comfortable on his semi-fowlers position.  Refused to be turned.  4/19/2025 1208 by Narda Scott, RN  Outcome: Progressing     Problem: Skin/Tissue Integrity  Goal: Skin integrity remains intact  Description: 1.  Monitor for areas of redness and/or skin breakdown2.  Assess vascular access sites hourly3.  Every 4-6 hours minimum:  Change oxygen saturation probe site4.  Every 4-6 hours:  If on nasal continuous positive airway pressure, respiratory therapy assess nares and determine need for appliance change or resting period  Outcome: Progressing  Flowsheets (Taken 4/19/2025 2054)  Skin Integrity Remains Intact:   Monitor for areas of redness and/or skin breakdown   Assess vascular access sites hourly  Note: Pt. Monitor for redness and breakdown.  Pt. Skin is clean dry and intact.  RN offered pt to be turned and discussed turning every 2 hours.  Pt. Verbalized understanding.     Problem: Safety - Adult  Goal: Free from fall injury  Outcome: Progressing  Flowsheets (Taken 4/19/2025 2054)  Free From Fall Injury: Instruct family/caregiver on patient safety  Note: Pt. Oriented with call bell when in need of help and assistance.  Pt. Verbalized understanding.

## 2025-04-21 ENCOUNTER — APPOINTMENT (OUTPATIENT)
Facility: HOSPITAL | Age: 80
End: 2025-04-21
Attending: FAMILY MEDICINE
Payer: OTHER GOVERNMENT

## 2025-04-21 LAB
ALBUMIN SERPL-MCNC: 1.3 G/DL (ref 3.4–5)
ALBUMIN/GLOB SERPL: 0.2 (ref 0.8–1.7)
ALP SERPL-CCNC: 219 U/L (ref 45–117)
ALT SERPL-CCNC: 75 U/L (ref 16–61)
ANION GAP SERPL CALC-SCNC: 13 MMOL/L (ref 3–18)
AST SERPL-CCNC: 248 U/L (ref 10–38)
BASOPHILS # BLD: 0.01 K/UL (ref 0–0.1)
BASOPHILS NFR BLD: 0.1 % (ref 0–2)
BILIRUB SERPL-MCNC: 3.1 MG/DL (ref 0.2–1)
BUN SERPL-MCNC: 33 MG/DL (ref 7–18)
BUN/CREAT SERPL: 26 (ref 12–20)
CALCIUM SERPL-MCNC: 8.4 MG/DL (ref 8.5–10.1)
CHLORIDE SERPL-SCNC: 103 MMOL/L (ref 100–111)
CO2 SERPL-SCNC: 18 MMOL/L (ref 21–32)
CREAT SERPL-MCNC: 1.25 MG/DL (ref 0.6–1.3)
DIFFERENTIAL METHOD BLD: ABNORMAL
EOSINOPHIL # BLD: 0.04 K/UL (ref 0–0.4)
EOSINOPHIL NFR BLD: 0.3 % (ref 0–5)
ERYTHROCYTE [DISTWIDTH] IN BLOOD BY AUTOMATED COUNT: 28 % (ref 11.6–14.5)
GLOBULIN SER CALC-MCNC: 5.4 G/DL (ref 2–4)
GLUCOSE BLD STRIP.AUTO-MCNC: 133 MG/DL (ref 70–110)
GLUCOSE BLD STRIP.AUTO-MCNC: 138 MG/DL (ref 70–110)
GLUCOSE BLD STRIP.AUTO-MCNC: 138 MG/DL (ref 70–110)
GLUCOSE BLD STRIP.AUTO-MCNC: 145 MG/DL (ref 70–110)
GLUCOSE SERPL-MCNC: 135 MG/DL (ref 74–99)
HCT VFR BLD AUTO: 34.2 % (ref 36–48)
HGB BLD-MCNC: 10.5 G/DL (ref 13–16)
IMM GRANULOCYTES # BLD AUTO: 0.29 K/UL (ref 0–0.04)
IMM GRANULOCYTES NFR BLD AUTO: 2 % (ref 0–0.5)
LYMPHOCYTES # BLD: 0.39 K/UL (ref 0.9–3.6)
LYMPHOCYTES NFR BLD: 2.7 % (ref 21–52)
MCH RBC QN AUTO: 25.8 PG (ref 24–34)
MCHC RBC AUTO-ENTMCNC: 30.7 G/DL (ref 31–37)
MCV RBC AUTO: 84 FL (ref 78–100)
MONOCYTES # BLD: 1.03 K/UL (ref 0.05–1.2)
MONOCYTES NFR BLD: 7.1 % (ref 3–10)
NEUTS SEG # BLD: 12.74 K/UL (ref 1.8–8)
NEUTS SEG NFR BLD: 87.8 % (ref 40–73)
NRBC # BLD: 0.25 K/UL (ref 0–0.01)
NRBC BLD-RTO: 1.7 PER 100 WBC
NT PRO BNP: 959 PG/ML (ref 0–1800)
PLATELET # BLD AUTO: 353 K/UL (ref 135–420)
PLATELET COMMENT: ABNORMAL
PMV BLD AUTO: 10.8 FL (ref 9.2–11.8)
POTASSIUM SERPL-SCNC: 4.2 MMOL/L (ref 3.5–5.5)
PROT SERPL-MCNC: 6.7 G/DL (ref 6.4–8.2)
RBC # BLD AUTO: 4.07 M/UL (ref 4.35–5.65)
RBC MORPH BLD: ABNORMAL
SODIUM SERPL-SCNC: 134 MMOL/L (ref 136–145)
WBC # BLD AUTO: 14.5 K/UL (ref 4.6–13.2)

## 2025-04-21 PROCEDURE — 99232 SBSQ HOSP IP/OBS MODERATE 35: CPT | Performed by: STUDENT IN AN ORGANIZED HEALTH CARE EDUCATION/TRAINING PROGRAM

## 2025-04-21 PROCEDURE — 2500000003 HC RX 250 WO HCPCS: Performed by: STUDENT IN AN ORGANIZED HEALTH CARE EDUCATION/TRAINING PROGRAM

## 2025-04-21 PROCEDURE — 36415 COLL VENOUS BLD VENIPUNCTURE: CPT

## 2025-04-21 PROCEDURE — 6360000002 HC RX W HCPCS: Performed by: STUDENT IN AN ORGANIZED HEALTH CARE EDUCATION/TRAINING PROGRAM

## 2025-04-21 PROCEDURE — 85025 COMPLETE CBC W/AUTO DIFF WBC: CPT

## 2025-04-21 PROCEDURE — 97530 THERAPEUTIC ACTIVITIES: CPT

## 2025-04-21 PROCEDURE — 6370000000 HC RX 637 (ALT 250 FOR IP): Performed by: STUDENT IN AN ORGANIZED HEALTH CARE EDUCATION/TRAINING PROGRAM

## 2025-04-21 PROCEDURE — 76705 ECHO EXAM OF ABDOMEN: CPT

## 2025-04-21 PROCEDURE — 80053 COMPREHEN METABOLIC PANEL: CPT

## 2025-04-21 PROCEDURE — 83880 ASSAY OF NATRIURETIC PEPTIDE: CPT

## 2025-04-21 PROCEDURE — 97535 SELF CARE MNGMENT TRAINING: CPT

## 2025-04-21 PROCEDURE — 82962 GLUCOSE BLOOD TEST: CPT

## 2025-04-21 PROCEDURE — 36410 VNPNXR 3YR/> PHY/QHP DX/THER: CPT

## 2025-04-21 PROCEDURE — 2580000003 HC RX 258: Performed by: STUDENT IN AN ORGANIZED HEALTH CARE EDUCATION/TRAINING PROGRAM

## 2025-04-21 PROCEDURE — 94761 N-INVAS EAR/PLS OXIMETRY MLT: CPT

## 2025-04-21 PROCEDURE — 1100000003 HC PRIVATE W/ TELEMETRY

## 2025-04-21 RX ORDER — SODIUM CHLORIDE, SODIUM LACTATE, POTASSIUM CHLORIDE, AND CALCIUM CHLORIDE .6; .31; .03; .02 G/100ML; G/100ML; G/100ML; G/100ML
250 INJECTION, SOLUTION INTRAVENOUS ONCE
Status: DISCONTINUED | OUTPATIENT
Start: 2025-04-21 | End: 2025-04-22

## 2025-04-21 RX ADMIN — OXYCODONE HYDROCHLORIDE 5 MG: 5 TABLET ORAL at 00:01

## 2025-04-21 RX ADMIN — PIPERACILLIN AND TAZOBACTAM 3375 MG: 3; .375 INJECTION, POWDER, LYOPHILIZED, FOR SOLUTION INTRAVENOUS at 09:41

## 2025-04-21 RX ADMIN — DILTIAZEM HYDROCHLORIDE 240 MG: 240 CAPSULE, EXTENDED RELEASE ORAL at 09:39

## 2025-04-21 RX ADMIN — GUAIFENESIN 1200 MG: 600 TABLET, EXTENDED RELEASE ORAL at 09:39

## 2025-04-21 RX ADMIN — SODIUM CHLORIDE, PRESERVATIVE FREE 10 ML: 5 INJECTION INTRAVENOUS at 00:02

## 2025-04-21 RX ADMIN — SODIUM CHLORIDE, PRESERVATIVE FREE 10 ML: 5 INJECTION INTRAVENOUS at 21:33

## 2025-04-21 RX ADMIN — APIXABAN 2.5 MG: 2.5 TABLET, FILM COATED ORAL at 09:39

## 2025-04-21 RX ADMIN — APIXABAN 2.5 MG: 2.5 TABLET, FILM COATED ORAL at 21:32

## 2025-04-21 ASSESSMENT — PAIN DESCRIPTION - LOCATION: LOCATION: BACK

## 2025-04-21 ASSESSMENT — PAIN SCALES - GENERAL
PAINLEVEL_OUTOF10: 0
PAINLEVEL_OUTOF10: 6
PAINLEVEL_OUTOF10: 0
PAINLEVEL_OUTOF10: 0

## 2025-04-21 ASSESSMENT — PAIN DESCRIPTION - PAIN TYPE: TYPE: CHRONIC PAIN

## 2025-04-21 ASSESSMENT — PAIN DESCRIPTION - ONSET: ONSET: GRADUAL

## 2025-04-21 ASSESSMENT — PAIN DESCRIPTION - DESCRIPTORS: DESCRIPTORS: BURNING;CRUSHING

## 2025-04-21 ASSESSMENT — PAIN - FUNCTIONAL ASSESSMENT: PAIN_FUNCTIONAL_ASSESSMENT: PREVENTS OR INTERFERES SOME ACTIVE ACTIVITIES AND ADLS

## 2025-04-21 ASSESSMENT — PAIN DESCRIPTION - ORIENTATION: ORIENTATION: POSTERIOR

## 2025-04-21 ASSESSMENT — PAIN DESCRIPTION - FREQUENCY: FREQUENCY: INTERMITTENT

## 2025-04-21 NOTE — PLAN OF CARE
Problem: Occupational Therapy - Adult  Goal: By Discharge: Performs self-care activities at highest level of function for planned discharge setting.  See evaluation for individualized goals.  Description: Occupational Therapy Goals:  Initiated 4/16/2025 to be met within 7-10 days.    1.  Patient will perform grooming with modified independence sitting at EOB >5 minutes.   2.  Patient will perform bathing with contact guard assistance.  3.  Patient will perform lower body dressing  with minimal assistance.  4.  Patient will perform toilet transfers with minimal assistance  5.  Patient will perform all aspects of toileting with minimal assistance.  6.  Patient will participate in upper extremity therapeutic exercise/activities with modified independence for 8-10 minutes to increase strength/endurance for ADLs.    7.  Patient will utilize energy conservation techniques during functional activities with verbal and visual cues.    PLOF:  Independent with ADLs and mobility prior to about 5 weeks ago. He has been in/out of the hospital for the past month. Lives alone.     Outcome: Not Progressing   OCCUPATIONAL THERAPY TREATMENT    Patient: Schuyler Philippe (79 y.o. male)  Date: 4/21/2025  Diagnosis: Respiratory failure [J96.90]  Pleural effusion on right [J90] Pleural effusion on right      Precautions: Fall Risk, General Precautions    Chart, occupational therapy assessment, plan of care, and goals were reviewed.  ASSESSMENT:  Co-treated w/PT to maximize pt participation. Much of session used to encourage/educate pt on importance of therapy. Pt refusing EOB and bed level activity despite education. No c/o pain or SOB.     Progression toward goals:  []          Improving appropriately and progressing toward goals  []          Improving slowly and progressing toward goals  [x]          Not making progress toward goals and plan of care will be adjusted     PLAN:  Patient continues to benefit from skilled intervention to  address the above impairments.  Continue treatment per established plan of care.    Further Equipment Recommendations for Discharge: hospital bed    AMPAC: AM-PAC Inpatient Daily Activity Raw Score: 12    Current research shows that an AM-PAC score of 17 or less is not associated with a discharge to the patient's home setting.    This AMPAC score should be considered in conjunction with interdisciplinary team recommendations to determine the most appropriate discharge setting. Patient's social support, diagnosis, medical stability, and prior level of function should also be taken into consideration.     SUBJECTIVE:   Patient stated, \"Just leave me alone.\"    OBJECTIVE DATA SUMMARY:   Cognitive/Behavioral Status:  Orientation  Orientation Level: Oriented to person;Oriented to place    ADL Intervention:  LE Dressing: Dependent/Total  Donning/doffing socks at bed level    Pain:  Intensity Pre-treatment: 0/10   Intensity Post-treatment: 0/10    Activity Tolerance:    Poor    After treatment:   [x]  Patient left in no apparent distress sitting up in chair  []  Patient left in no apparent distress in bed  [x]  Call bell left within reach  []  Nursing notified  []  Caregiver present  [x]  Bed/chair alarm activated    COMMUNICATION/EDUCATION:   Patient Education  Education Given To: Patient  Education Provided: Plan of Care  Education Method: Verbal;Teach Back  Barriers to Learning: Cognition  Education Outcome: Continued education needed    Thank you for this referral.  LUTHER Mccall  Minutes: 10

## 2025-04-21 NOTE — PROCEDURES
MIDLine Insertion Procedure Note    Procedure: Insertion of #4 FR/18G Midline    Indications:  Poor Access    Procedure Details   Informed consent was obtained for the procedure, including risks of infection, hemorrhage, and tissue/vascular damage were discussed. All questions answered.     #4 FR/18G Midline inserted to the right brachial vein per hospital protocol.   Blood return:  yes    Findings:  Best vein chosen for pt circumstances with US guidance, with <45% CVR.  2ml 1% lidocaine w/o epinephrine administered via local infiltration. Catheter inserted to 14 cm, with 0 cm. Exposed. Mid upper arm circumference is 31 cm.  There were no changes to vital signs. Catheter was flushed with 10 cc NS. Patient did tolerate procedure well. All  PICC kit components accounted for and intact.    Recommendations:  Okay to use line now.  MIDLINE Brochure given to patient with teaching instruction. Post procedure hand off given to TERESA Orta. Recommend to change IV tubing prior to use.     PROCEDURE NOTE  Date: 4/21/2025   Name: Schuyler Philippe  YOB: 1945    Procedures

## 2025-04-21 NOTE — PROGRESS NOTES
2032: Pt complained of his hand hurting at IV site. RN assesses site. Tried to flush IV, pt scream in pain, RN noticed swelling due to infiltration, IV removed. PT currently with no access.   2130: Charge Nurse tried twice to  put in new IV to no avail.  Charge nurse tried again with the use of a vein finder and primary nurse, attempt was still unsuccessful.   Nursing Supervisor Notified.  0230: TERESA Espinoza attempted twice and Nursing supervisor but all attempts for a new line were unsuccessful.   Dr. Jacques Notified of patient situation of no access. Orders placed for midline.

## 2025-04-21 NOTE — PLAN OF CARE
Problem: Physical Therapy - Adult  Goal: By Discharge: Performs mobility at highest level of function for planned discharge setting.  See evaluation for individualized goals.  Description: Physical Therapy Goals:  Initiated 4/16/2025 to be met within 7-10 days.    1.  Patient will move from supine to sit and sit to supine  in bed with modified independence.    2.  Patient will transfer from bed to chair and chair to bed with modified independence using the least restrictive device.  3.  Patient will perform sit to stand with modified independence.  4.  Patient will ambulate with modified independence for 150 feet with the least restrictive device.       PLOF: Independent with ADLs and mobility prior to about 5 weeks ago. He has been in/out of the hospital for the past month. Lives alone.       PHYSICAL THERAPY TREATMENT    Patient: Schuyler Philippe (79 y.o. male)  Date: 4/21/2025  Diagnosis: Respiratory failure [J96.90]  Pleural effusion on right [J90] Pleural effusion on right      Precautions: Fall Risk, General Precautions,  ,  ,  ,  ,  ,  ,      ASSESSMENT:  Patient is resting in bed upon entry. Seen with OT.  Needs maximum encouragement to participate in mobility with minimal effort. Patient refusing to participate in bed/ EOB level activity. All needs left in reach .    Progression toward goals:   []      Improving appropriately and progressing toward goals  [x]      Improving slowly and progressing toward goals  []      Not making progress toward goals and plan of care will be adjusted     PLAN:  Patient continues to benefit from skilled intervention to address the above impairments.  Continue treatment per established plan of care.    Further Equipment Recommendations for Discharge: hospital bed, WC, mechanical lift     [facility]: AM-PAC Inpatient Mobility Raw Score : 9      Current research shows that an AM-PAC score of 17 (13 without stairs) or less is not associated with a discharge to the patient's home

## 2025-04-21 NOTE — PROGRESS NOTES
normal fashion.  Ultrasound was used to localize the pocket of fluid and the skin was marked accordingly. The skin was cleansed and anesthetized and maximum sterile barrier technique was used. A sterile drape was applied. A 22-gauge needle was advanced into the pleural space with return of clear light yellow fluid. A 5 Salvadorean needle catheter was advanced into the pleural space and 1200 mL of fluid was removed before the catheter was removed. The patient tolerated the procedure well no apparent complications. A followup chest x-ray has been ordered.     Successful right thoracentesis.  Specimens obtained and sent to the lab. Electronically signed by Vinod Hopkins    XR CHEST 1 VIEW  Result Date: 4/16/2025  HISTORY: 79 years. Male.  post right thoracentesis. . EXAM:  XR CHEST 1 VIEW. Compared: Recent prior. FINDINGS: LUNGS/PLEURA: Improved aeration of right hemothorax is demonstrated with minimal residual right lung base pleural fluid present. Very small basilar pleural air is seen representing trapped lung. Unchanged sequela of minimal congestion. Unremarkable aeration of left hemithorax. MEDIASTINUM: Unremarkable. . BONES: Unremarkable. SOFT TISSUES: Unremarkable. TUBES/CATHETERS/DEVICES: None.      IMPRESSION: 1. Improved aeration of right hemothorax with minimal pleural air most likely represents trapped lung. Follow-up with plain imaging of the chest. . Electronically signed by Vinod Ecinityerica      Assessment/Plan:     Acute hypoxic respiratory failure secondary to possible parapneumonic effusion and pneumonia  S/p right thoracentesis with removal of 1.2 L of yellow-colored fluid on April 16, 2025.  Cytology was negative for malignancy.  Pleural fluid cultures were negative.  MRSA screen came back negative.  - Patient has completed a course of Zosyn and vancomycin.  Antibiotics have been discontinued.  Pulmonology input appreciated  .  Pulmonary hygiene.  Antitussives.  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   Continue diltiazem for rate control  Continue apixaban for long-term anticoagulation     Anemia of chronic disease  -Hemoglobin stable.  Transfuse to keep hemoglobin greater than 7.  No signs symptoms of active bleed.  Iron studies reviewed.  B12 folate normal     Hyperlipidemia  -Statin     Hypertension   -Home meds.     type 2 diabetes  -.  A1c 7.1, very good control.  SSI, Accu-Cheks.     PTSD  Continue low-dose sertraline.       Please contact Dr. De Luna if there are any questions. Greater than 35 minutes were spent reviewing the patient's chart, obtaining a thorough history, performing a physical exam, placing orders and dictating this document.  Findings and plan were also discussed with the patient/patient's family and with RN.  Greater than 50% of the time was spent on direct patient care.         Signed By: [unfilled]     April 21, 2025 1:29 PM

## 2025-04-21 NOTE — CARE COORDINATION
DIONICIO called the patient  spoke with Ericka with the .[phone] EXT. [phone]. To notify her [facility] has accepted the patient is agreeable, but will need authorization. No answer, DIONICIO left a VM.    Update: 2:14 pm  [facility] D[facility]ICIO faxed it to: [phone].     [facility] sated she will notify the appropriate person to assist with the Skilled nursing process and forward DIONICIO contact information.

## 2025-04-21 NOTE — PLAN OF CARE
Problem: Chronic Conditions and Co-morbidities  Goal: Patient's chronic conditions and co-morbidity symptoms are monitored and maintained or improved  Outcome: Progressing  Flowsheets (Taken 4/21/2025 0915)  Care Plan - Patient's Chronic Conditions and Co-Morbidity Symptoms are Monitored and Maintained or Improved:   Monitor and assess patient's chronic conditions and comorbid symptoms for stability, deterioration, or improvement   Collaborate with multidisciplinary team to address chronic and comorbid conditions and prevent exacerbation or deterioration   Update acute care plan with appropriate goals if chronic or comorbid symptoms are exacerbated and prevent overall improvement and discharge     Problem: Skin/Tissue Integrity  Goal: Skin integrity remains intact  Description: 1.  Monitor for areas of redness and/or skin breakdown2.  Assess vascular access sites hourly3.  Every 4-6 hours minimum:  Change oxygen saturation probe site4.  Every 4-6 hours:  If on nasal continuous positive airway pressure, respiratory therapy assess nares and determine need for appliance change or resting period  Outcome: Progressing  Flowsheets (Taken 4/21/2025 0915)  Skin Integrity Remains Intact:   Monitor for areas of redness and/or skin breakdown   Positioning devices   Assess need for specialty bed     Problem: ABCDS Injury Assessment  Goal: Absence of physical injury  Outcome: Progressing     Problem: Safety - Adult  Goal: Free from fall injury  Outcome: Progressing  Flowsheets (Taken 4/21/2025 0915)  Free From Fall Injury:   Instruct family/caregiver on patient safety   Based on caregiver fall risk screen, instruct family/caregiver to ask for assistance with transferring infant if caregiver noted to have fall risk factors     Problem: Pain  Goal: Verbalizes/displays adequate comfort level or baseline comfort level  Outcome: Progressing  Flowsheets (Taken 4/21/2025 0915)  Verbalizes/displays adequate comfort level or baseline

## 2025-04-21 NOTE — PROGRESS NOTES
Nighttime hospitalist note  I was notified by RN a few minutes ago that patient does not have any IV access.  I have placed orders for vascular access team to place a midline.

## 2025-04-22 LAB
ALBUMIN SERPL-MCNC: 1.2 G/DL (ref 3.4–5)
ALBUMIN/GLOB SERPL: 0.2 (ref 0.8–1.7)
ALP SERPL-CCNC: 215 U/L (ref 45–117)
ALT SERPL-CCNC: 73 U/L (ref 16–61)
ANION GAP SERPL CALC-SCNC: 12 MMOL/L (ref 3–18)
AST SERPL-CCNC: 239 U/L (ref 10–38)
BACTERIA SPEC CULT: NORMAL
BACTERIA SPEC CULT: NORMAL
BASOPHILS # BLD: 0.03 K/UL (ref 0–0.1)
BASOPHILS NFR BLD: 0.2 % (ref 0–2)
BILIRUB SERPL-MCNC: 3.4 MG/DL (ref 0.2–1)
BUN SERPL-MCNC: 35 MG/DL (ref 7–18)
BUN/CREAT SERPL: 29 (ref 12–20)
CALCIUM SERPL-MCNC: 8.5 MG/DL (ref 8.5–10.1)
CHLORIDE SERPL-SCNC: 104 MMOL/L (ref 100–111)
CO2 SERPL-SCNC: 15 MMOL/L (ref 21–32)
CREAT SERPL-MCNC: 1.21 MG/DL (ref 0.6–1.3)
DIFFERENTIAL METHOD BLD: ABNORMAL
EOSINOPHIL # BLD: 0 K/UL (ref 0–0.4)
EOSINOPHIL NFR BLD: 0 % (ref 0–5)
ERYTHROCYTE [DISTWIDTH] IN BLOOD BY AUTOMATED COUNT: 29.2 % (ref 11.6–14.5)
GLOBULIN SER CALC-MCNC: 5.5 G/DL (ref 2–4)
GLUCOSE BLD STRIP.AUTO-MCNC: 116 MG/DL (ref 70–110)
GLUCOSE BLD STRIP.AUTO-MCNC: 133 MG/DL (ref 70–110)
GLUCOSE BLD STRIP.AUTO-MCNC: 133 MG/DL (ref 70–110)
GLUCOSE BLD STRIP.AUTO-MCNC: 154 MG/DL (ref 70–110)
GLUCOSE SERPL-MCNC: 131 MG/DL (ref 74–99)
HCT VFR BLD AUTO: 37.8 % (ref 36–48)
HGB BLD-MCNC: 11.4 G/DL (ref 13–16)
IMM GRANULOCYTES # BLD AUTO: 0.17 K/UL (ref 0–0.04)
IMM GRANULOCYTES NFR BLD AUTO: 1.3 % (ref 0–0.5)
LACTATE SERPL-SCNC: 4 MMOL/L (ref 0.4–2)
LYMPHOCYTES # BLD: 0.43 K/UL (ref 0.9–3.6)
LYMPHOCYTES NFR BLD: 3.3 % (ref 21–52)
Lab: NORMAL
Lab: NORMAL
MCH RBC QN AUTO: 26.5 PG (ref 24–34)
MCHC RBC AUTO-ENTMCNC: 30.2 G/DL (ref 31–37)
MCV RBC AUTO: 87.7 FL (ref 78–100)
MONOCYTES # BLD: 1.15 K/UL (ref 0.05–1.2)
MONOCYTES NFR BLD: 8.9 % (ref 3–10)
NEUTS SEG # BLD: 11.08 K/UL (ref 1.8–8)
NEUTS SEG NFR BLD: 86.3 % (ref 40–73)
NRBC # BLD: 0.18 K/UL (ref 0–0.01)
NRBC BLD-RTO: 1.4 PER 100 WBC
PLATELET # BLD AUTO: 303 K/UL (ref 135–420)
PMV BLD AUTO: 10.7 FL (ref 9.2–11.8)
POTASSIUM SERPL-SCNC: 4.8 MMOL/L (ref 3.5–5.5)
PROT SERPL-MCNC: 6.7 G/DL (ref 6.4–8.2)
RBC # BLD AUTO: 4.31 M/UL (ref 4.35–5.65)
SODIUM SERPL-SCNC: 131 MMOL/L (ref 136–145)
WBC # BLD AUTO: 12.9 K/UL (ref 4.6–13.2)

## 2025-04-22 PROCEDURE — 99232 SBSQ HOSP IP/OBS MODERATE 35: CPT | Performed by: INTERNAL MEDICINE

## 2025-04-22 PROCEDURE — 97110 THERAPEUTIC EXERCISES: CPT

## 2025-04-22 PROCEDURE — 2500000003 HC RX 250 WO HCPCS: Performed by: STUDENT IN AN ORGANIZED HEALTH CARE EDUCATION/TRAINING PROGRAM

## 2025-04-22 PROCEDURE — 82962 GLUCOSE BLOOD TEST: CPT

## 2025-04-22 PROCEDURE — 6370000000 HC RX 637 (ALT 250 FOR IP): Performed by: STUDENT IN AN ORGANIZED HEALTH CARE EDUCATION/TRAINING PROGRAM

## 2025-04-22 PROCEDURE — 36415 COLL VENOUS BLD VENIPUNCTURE: CPT

## 2025-04-22 PROCEDURE — 2580000003 HC RX 258: Performed by: INTERNAL MEDICINE

## 2025-04-22 PROCEDURE — 6360000002 HC RX W HCPCS: Performed by: STUDENT IN AN ORGANIZED HEALTH CARE EDUCATION/TRAINING PROGRAM

## 2025-04-22 PROCEDURE — 85025 COMPLETE CBC W/AUTO DIFF WBC: CPT

## 2025-04-22 PROCEDURE — 83605 ASSAY OF LACTIC ACID: CPT

## 2025-04-22 PROCEDURE — 80053 COMPREHEN METABOLIC PANEL: CPT

## 2025-04-22 PROCEDURE — 1100000003 HC PRIVATE W/ TELEMETRY

## 2025-04-22 RX ORDER — SODIUM CHLORIDE, SODIUM LACTATE, POTASSIUM CHLORIDE, AND CALCIUM CHLORIDE .6; .31; .03; .02 G/100ML; G/100ML; G/100ML; G/100ML
500 INJECTION, SOLUTION INTRAVENOUS ONCE
Status: COMPLETED | OUTPATIENT
Start: 2025-04-22 | End: 2025-04-22

## 2025-04-22 RX ADMIN — APIXABAN 2.5 MG: 2.5 TABLET, FILM COATED ORAL at 21:07

## 2025-04-22 RX ADMIN — SODIUM CHLORIDE, PRESERVATIVE FREE 10 ML: 5 INJECTION INTRAVENOUS at 08:55

## 2025-04-22 RX ADMIN — SERTRALINE HYDROCHLORIDE 25 MG: 25 TABLET ORAL at 21:07

## 2025-04-22 RX ADMIN — SODIUM CHLORIDE, PRESERVATIVE FREE 10 ML: 5 INJECTION INTRAVENOUS at 21:09

## 2025-04-22 RX ADMIN — SODIUM CHLORIDE, PRESERVATIVE FREE 10 ML: 5 INJECTION INTRAVENOUS at 21:07

## 2025-04-22 RX ADMIN — ONDANSETRON 4 MG: 2 INJECTION, SOLUTION INTRAMUSCULAR; INTRAVENOUS at 09:02

## 2025-04-22 RX ADMIN — SODIUM CHLORIDE, SODIUM LACTATE, POTASSIUM CHLORIDE, AND CALCIUM CHLORIDE 500 ML: .6; .31; .03; .02 INJECTION, SOLUTION INTRAVENOUS at 18:32

## 2025-04-22 ASSESSMENT — PAIN SCALES - GENERAL
PAINLEVEL_OUTOF10: 0

## 2025-04-22 NOTE — PLAN OF CARE
Problem: Physical Therapy - Adult  Goal: By Discharge: Performs mobility at highest level of function for planned discharge setting.  See evaluation for individualized goals.  Description: Physical Therapy Goals:  Initiated 4/16/2025 to be met within 7-10 days.    1.  Patient will move from supine to sit and sit to supine  in bed with modified independence.    2.  Patient will transfer from bed to chair and chair to bed with modified independence using the least restrictive device.  3.  Patient will perform sit to stand with modified independence.  4.  Patient will ambulate with modified independence for 150 feet with the least restrictive device.       PLOF: Independent with ADLs and mobility prior to about 5 weeks ago. He has been in/out of the hospital for the past month. Lives alone.       Outcome: Progressing     PHYSICAL THERAPY TREATMENT    Patient: Schuyler Philippe (79 y.o. male)  Date: 4/22/2025  Diagnosis: Respiratory failure [J96.90]  Pleural effusion on right [J90] Pleural effusion on right      Precautions: Fall Risk, General Precautions, Bed Alarm      ASSESSMENT:  Pt received in bed. Denies pain. Max encouragement with little participation. Pt declines mobility but is agreeable to bed level therex. PROM to BLE intact. Able to perform AAROM to maintain joint integrity. Pt encouraged increasing activity level and to prepare to sit up next visit. Educated on importance of therapy to prevent deleterious effects of immobility.   Recommend for next PT session:  Supine <> sit     Progression toward goals:   [x]      Improving appropriately and progressing toward goals  []      Improving slowly and progressing toward goals  []      Not making progress toward goals and plan of care will be adjusted     PLAN:  Patient continues to benefit from skilled intervention to address the above impairments.  Continue treatment per established plan of care.    Further Equipment Recommendations for Discharge: bedside  commode, hospital bed, mechanical lift, and wheelchair 20 inch    AMPA: AM-PAC Inpatient Mobility Raw Score : 9      Current research shows that an AM-PAC score of 17 (13 without stairs) or less is not associated with a discharge to the patient's home setting.    This AMPA score should be considered in conjunction with interdisciplinary team recommendations to determine the most appropriate discharge setting. Patient's social support, diagnosis, medical stability, and prior level of function should also be taken into consideration.     SUBJECTIVE:   Patient stated, \"I am not moving right now.\"    OBJECTIVE DATA SUMMARY:   Critical Behavior:  Orientation  Overall Orientation Status: Within Normal Limits  Orientation Level: Oriented to person;Oriented to place       Functional Mobility Training:  Bed Mobility:  Bed Mobility Training  Bed Mobility Training: No  Transfers:  Transfer Training  Transfer Training: No    Ambulation/Gait Training:     Gait  Gait Training: No    Intensity Pre-treatment: 0/10   Intensity Post-treatment: 0/10      Activity Tolerance:   Activity Tolerance: Patient limited by fatigue;Patient limited by endurance  Please refer to the flowsheet for vital signs taken during this treatment.  After treatment:   [] Patient left in no apparent distress sitting up in chair  [x] Patient left in no apparent distress in bed  [x] Call bell left within reach  [x] Nursing notified  [] Caregiver present  [x] Bed/chair alarm activated  [] No alarm  [] SCDs applied      COMMUNICATION/EDUCATION:   Patient Education  Education Given To: Patient  Education Provided: Role of Therapy;Plan of Care  Education Method: Verbal;Teach Back  Barriers to Learning: None  Education Outcome: Verbalized understanding;Continued education needed      Soo Hernandez, PT  Minutes: 8

## 2025-04-22 NOTE — PROGRESS NOTES
Bedside and Verbal shift change report given to TERESA Ruby (oncoming nurse) by TERESA Lin (offgoing nurse). Report included the following information Nurse Handoff Report and Adult Overview.

## 2025-04-22 NOTE — PROGRESS NOTES
[facility] Hospitalist Group  Progress Note    Patient: Schuyler Philippe Age: 79 y.o. : 1945 MR#: [id number] SSN: [id number]  Date/Time: 2025     Subjective:   Patient seen and examined bedside. Lethargic and weak. Awaiting auth.    Dispo SNF  [facility] 25    Review of systems  General: No fevers or chills.  Cardiovascular: No chest pain or pressure. No palpitations.   Pulmonary: No shortness of breath, cough or wheeze.   Gastrointestinal: No abdominal pain, nausea, vomiting or diarrhea.   Genitourinary: No urinary frequency, urgency, hesitancy or dysuria.   Musculoskeletal: No joint or muscle pain, no back pain, no recent trauma.    Neurologic: No headache, numbness, tingling or weakness.   Assessment/Plan:   Acute hypoxic respiratory failure secondary to possible parapneumonic effusion and pneumonia  S/p right thoracentesis with removal of 1.2 L of yellow-colored fluid on 2025.  Cytology was negative for malignancy.  Pleural fluid cultures were negative.  MRSA screen came back negative.  - Patient has completed a course of Zosyn and vancomycin.  Antibiotics have been discontinued.  Pulmonology input appreciated  .  Pulmonary hygiene.  Antitussives.  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   Continue diltiazem for rate control  Continue apixaban for long-term anticoagulation     Anemia of chronic disease  -Hemoglobin stable.  Transfuse to keep hemoglobin greater than 7.  No signs symptoms of active bleed.  Iron studies reviewed.  B12 folate normal     Hyperlipidemia  -Statin     Hypertension   -Home meds.     type 2 diabetes  -.   - 11.8 FL    Nucleated RBCs 1.4 (H) 0  WBC    nRBC 0.18 (H) 0.00 - 0.01 K/uL    Neutrophils % 86.3 (H) 40.0 - 73.0 %    Lymphocytes % 3.3 (L) 21.0 - 52.0 %    Monocytes % 8.9 3.0 - 10.0 %    Eosinophils % 0.0 0.0 - 5.0 %    Basophils % 0.2 0.0 - 2.0 %    Immature Granulocytes % 1.3 (H) 0.0 - 0.5 %    Neutrophils Absolute 11.08 (H) 1.80 - 8.00 K/UL    Lymphocytes Absolute 0.43 (L) 0.90 - 3.60 K/UL    Monocytes Absolute 1.15 0.05 - 1.20 K/UL    Eosinophils Absolute 0.00 0.00 - 0.40 K/UL    Basophils Absolute 0.03 0.00 - 0.10 K/UL    Immature Granulocytes Absolute 0.17 (H) 0.00 - 0.04 K/UL    Differential Type AUTOMATED     Comprehensive Metabolic Panel    Collection Time: 04/22/25  4:12 AM   Result Value Ref Range    Sodium 131 (L) 136 - 145 mmol/L    Potassium 4.8 3.5 - 5.5 mmol/L    Chloride 104 100 - 111 mmol/L    CO2 15 (L) 21 - 32 mmol/L    Anion Gap 12 3.0 - 18 mmol/L    Glucose 131 (H) 74 - 99 mg/dL    BUN 35 (H) 7.0 - 18 MG/DL    Creatinine 1.21 0.6 - 1.3 MG/DL    BUN/Creatinine Ratio 29 (H) 12 - 20      Est, Glom Filt Rate 61 >60 ml/min/1.73m2    Calcium 8.5 8.5 - 10.1 MG/DL    Total Bilirubin 3.4 (H) 0.2 - 1.0 MG/DL    ALT 73 (H) 16 - 61 U/L     (H) 10 - 38 U/L    Alk Phosphatase 215 (H) 45 - 117 U/L    Total Protein 6.7 6.4 - 8.2 g/dL    Albumin 1.2 (L) 3.4 - 5.0 g/dL    Globulin 5.5 (H) 2.0 - 4.0 g/dL    Albumin/Globulin Ratio 0.2 (L) 0.8 - 1.7     POCT Glucose    Collection Time: 04/22/25  7:49 AM   Result Value Ref Range    POC Glucose 133 (H) 70 - 110 mg/dL   POCT Glucose    Collection Time: 04/22/25 12:31 PM   Result Value Ref Range    POC Glucose 154 (H) 70 - 110 mg/dL   Lactic Acid    Collection Time: 04/22/25 12:51 PM   Result Value Ref Range    Lactic Acid, Plasma 4.0 (HH) 0.4 - 2.0 MMOL/L       Signed By: Derrell Islas,      April 22, 2025      Disclaimer: Sections of this note are dictated using utilizing voice recognition software.  Minor typographical errors may be present. If

## 2025-04-22 NOTE — PLAN OF CARE
Problem: Chronic Conditions and Co-morbidities  Goal: Patient's chronic conditions and co-morbidity symptoms are monitored and maintained or improved  4/22/2025 1738 by Delia Duarte RN  Outcome: Progressing  Flowsheets (Taken 4/22/2025 0900)  Care Plan - Patient's Chronic Conditions and Co-Morbidity Symptoms are Monitored and Maintained or Improved: Monitor and assess patient's chronic conditions and comorbid symptoms for stability, deterioration, or improvement  Note: Assessing patient for nausea and vomiting and giving patient medication as needed       Problem: Discharge Planning  Goal: Discharge to home or other facility with appropriate resources  4/22/2025 1738 by Delia Duarte, RN  Outcome: Progressing  Flowsheets (Taken 4/22/2025 0900)  Discharge to home or other facility with appropriate resources: Identify barriers to discharge with patient and caregiver  Note: Educating patient on patient teaching     Problem: Skin/Tissue Integrity  Goal: Skin integrity remains intact  Description: 1.  Monitor for areas of redness and/or skin breakdown2.  Assess vascular access sites hourly3.  Every 4-6 hours minimum:  Change oxygen saturation probe site4.  Every 4-6 hours:  If on nasal continuous positive airway pressure, respiratory therapy assess nares and determine need for appliance change or resting period  4/22/2025 1738 by Delia Duarte RN  Outcome: Progressing  Flowsheets (Taken 4/22/2025 0900)  Skin Integrity Remains Intact: Assess vascular access sites hourly  Note: Patient skin intact and turned and repositioned frequently      Problem: ABCDS Injury Assessment  Goal: Absence of physical injury  4/22/2025 1738 by Delia Duarte, RN  Outcome: Progressing  Flowsheets (Taken 4/22/2025 0444 by Dinesh Aviles, RN)  Absence of Physical Injury: Implement safety measures based on patient assessment  Note: Performing hourly rounds to assess patient needs      Problem: Safety - Adult  Goal: Free from fall

## 2025-04-22 NOTE — PLAN OF CARE
Problem: Physical Therapy - Adult  Goal: By Discharge: Performs mobility at highest level of function for planned discharge setting.  See evaluation for individualized goals.  Description: Physical Therapy Goals:  Initiated 4/16/2025 to be met within 7-10 days.    1.  Patient will move from supine to sit and sit to supine  in bed with modified independence.    2.  Patient will transfer from bed to chair and chair to bed with modified independence using the least restrictive device.  3.  Patient will perform sit to stand with modified independence.  4.  Patient will ambulate with modified independence for 150 feet with the least restrictive device.       PLOF: Independent with ADLs and mobility prior to about 5 weeks ago. He has been in/out of the hospital for the past month. Lives alone.       4/21/2025 1544 by Kamala Garcia, PT  Outcome: Not Progressing

## 2025-04-22 NOTE — PLAN OF CARE
Problem: Physical Therapy - Adult  Goal: By Discharge: Performs mobility at highest level of function for planned discharge setting.  See evaluation for individualized goals.  Description: Physical Therapy Goals:  Initiated 4/16/2025 to be met within 7-10 days.    1.  Patient will move from supine to sit and sit to supine  in bed with modified independence.    2.  Patient will transfer from bed to chair and chair to bed with modified independence using the least restrictive device.  3.  Patient will perform sit to stand with modified independence.  4.  Patient will ambulate with modified independence for 150 feet with the least restrictive device.       PLOF: Independent with ADLs and mobility prior to about 5 weeks ago. He has been in/out of the hospital for the past month. Lives alone.       4/21/2025 1544 by Kamala Garcia, PT  Outcome: Not Progressing     Problem: Physical Therapy - Adult  Goal: By Discharge: Performs mobility at highest level of function for planned discharge setting.  See evaluation for individualized goals.  Description: Physical Therapy Goals:  Initiated 4/16/2025 to be met within 7-10 days.    1.  Patient will move from supine to sit and sit to supine  in bed with modified independence.    2.  Patient will transfer from bed to chair and chair to bed with modified independence using the least restrictive device.  3.  Patient will perform sit to stand with modified independence.  4.  Patient will ambulate with modified independence for 150 feet with the least restrictive device.       PLOF: Independent with ADLs and mobility prior to about 5 weeks ago. He has been in/out of the hospital for the past month. Lives alone.       4/21/2025 1544 by Kamala Garcia, PT  Outcome: Not Progressing

## 2025-04-22 NOTE — CARE COORDINATION
Ericka with the .[phone]. [phone]. Earlier today to follow up on the patients Auth. Ericka stated Joyce who is in another dept. Will follow up with DIONICIO.     DIONICIO has not received a call, DIONICIO left a message with Ericka requesting Joyce's extension. DIONICIO will follow up in the morning.     Update: 4:15 pm    Sharon called DIONICIO back. She provided her with Joyce's extension [phone]. DIONICIO will follow up in the morning.

## 2025-04-23 LAB
ALBUMIN SERPL-MCNC: 1.2 G/DL (ref 3.4–5)
ALBUMIN/GLOB SERPL: 0.2 (ref 0.8–1.7)
ALP SERPL-CCNC: 217 U/L (ref 45–117)
ALT SERPL-CCNC: 69 U/L (ref 16–61)
ANION GAP SERPL CALC-SCNC: 14 MMOL/L (ref 3–18)
AST SERPL-CCNC: 230 U/L (ref 10–38)
BASOPHILS # BLD: 0.02 K/UL (ref 0–0.1)
BASOPHILS NFR BLD: 0.2 % (ref 0–2)
BILIRUB SERPL-MCNC: 3.6 MG/DL (ref 0.2–1)
BUN SERPL-MCNC: 32 MG/DL (ref 7–18)
BUN/CREAT SERPL: 30 (ref 12–20)
CALCIUM SERPL-MCNC: 8.2 MG/DL (ref 8.5–10.1)
CHLORIDE SERPL-SCNC: 105 MMOL/L (ref 100–111)
CO2 SERPL-SCNC: 14 MMOL/L (ref 21–32)
CREAT SERPL-MCNC: 1.08 MG/DL (ref 0.6–1.3)
DIFFERENTIAL METHOD BLD: ABNORMAL
EOSINOPHIL # BLD: 0 K/UL (ref 0–0.4)
EOSINOPHIL NFR BLD: 0 % (ref 0–5)
ERYTHROCYTE [DISTWIDTH] IN BLOOD BY AUTOMATED COUNT: 29.3 % (ref 11.6–14.5)
GLOBULIN SER CALC-MCNC: 5.2 G/DL (ref 2–4)
GLUCOSE BLD STRIP.AUTO-MCNC: 121 MG/DL (ref 70–110)
GLUCOSE BLD STRIP.AUTO-MCNC: 140 MG/DL (ref 70–110)
GLUCOSE BLD STRIP.AUTO-MCNC: 140 MG/DL (ref 70–110)
GLUCOSE BLD STRIP.AUTO-MCNC: 163 MG/DL (ref 70–110)
GLUCOSE SERPL-MCNC: 129 MG/DL (ref 74–99)
HCT VFR BLD AUTO: 34.2 % (ref 36–48)
HGB BLD-MCNC: 10.5 G/DL (ref 13–16)
IMM GRANULOCYTES # BLD AUTO: 0.17 K/UL (ref 0–0.04)
IMM GRANULOCYTES NFR BLD AUTO: 1.3 % (ref 0–0.5)
LYMPHOCYTES # BLD: 0.32 K/UL (ref 0.9–3.6)
LYMPHOCYTES NFR BLD: 2.5 % (ref 21–52)
MCH RBC QN AUTO: 26.7 PG (ref 24–34)
MCHC RBC AUTO-ENTMCNC: 30.7 G/DL (ref 31–37)
MCV RBC AUTO: 87 FL (ref 78–100)
MONOCYTES # BLD: 1.33 K/UL (ref 0.05–1.2)
MONOCYTES NFR BLD: 10.5 % (ref 3–10)
NEUTS SEG # BLD: 10.81 K/UL (ref 1.8–8)
NEUTS SEG NFR BLD: 85.5 % (ref 40–73)
NRBC # BLD: 0.19 K/UL (ref 0–0.01)
NRBC BLD-RTO: 1.5 PER 100 WBC
PLATELET # BLD AUTO: 288 K/UL (ref 135–420)
PMV BLD AUTO: 10.3 FL (ref 9.2–11.8)
POTASSIUM SERPL-SCNC: 3.6 MMOL/L (ref 3.5–5.5)
PROT SERPL-MCNC: 6.4 G/DL (ref 6.4–8.2)
RBC # BLD AUTO: 3.93 M/UL (ref 4.35–5.65)
SODIUM SERPL-SCNC: 133 MMOL/L (ref 136–145)
WBC # BLD AUTO: 12.7 K/UL (ref 4.6–13.2)

## 2025-04-23 PROCEDURE — 80053 COMPREHEN METABOLIC PANEL: CPT

## 2025-04-23 PROCEDURE — 97164 PT RE-EVAL EST PLAN CARE: CPT

## 2025-04-23 PROCEDURE — 36415 COLL VENOUS BLD VENIPUNCTURE: CPT

## 2025-04-23 PROCEDURE — 6360000002 HC RX W HCPCS: Performed by: STUDENT IN AN ORGANIZED HEALTH CARE EDUCATION/TRAINING PROGRAM

## 2025-04-23 PROCEDURE — 99232 SBSQ HOSP IP/OBS MODERATE 35: CPT | Performed by: INTERNAL MEDICINE

## 2025-04-23 PROCEDURE — 97535 SELF CARE MNGMENT TRAINING: CPT

## 2025-04-23 PROCEDURE — 6370000000 HC RX 637 (ALT 250 FOR IP): Performed by: STUDENT IN AN ORGANIZED HEALTH CARE EDUCATION/TRAINING PROGRAM

## 2025-04-23 PROCEDURE — 6370000000 HC RX 637 (ALT 250 FOR IP): Performed by: INTERNAL MEDICINE

## 2025-04-23 PROCEDURE — 82962 GLUCOSE BLOOD TEST: CPT

## 2025-04-23 PROCEDURE — 97168 OT RE-EVAL EST PLAN CARE: CPT

## 2025-04-23 PROCEDURE — 1100000003 HC PRIVATE W/ TELEMETRY

## 2025-04-23 PROCEDURE — 2500000003 HC RX 250 WO HCPCS: Performed by: STUDENT IN AN ORGANIZED HEALTH CARE EDUCATION/TRAINING PROGRAM

## 2025-04-23 PROCEDURE — 85025 COMPLETE CBC W/AUTO DIFF WBC: CPT

## 2025-04-23 RX ORDER — METOPROLOL TARTRATE 25 MG/1
25 TABLET, FILM COATED ORAL 2 TIMES DAILY
Status: DISCONTINUED | OUTPATIENT
Start: 2025-04-23 | End: 2025-05-02 | Stop reason: HOSPADM

## 2025-04-23 RX ADMIN — APIXABAN 2.5 MG: 2.5 TABLET, FILM COATED ORAL at 08:56

## 2025-04-23 RX ADMIN — SODIUM CHLORIDE, PRESERVATIVE FREE 10 ML: 5 INJECTION INTRAVENOUS at 09:02

## 2025-04-23 RX ADMIN — DILTIAZEM HYDROCHLORIDE 240 MG: 240 CAPSULE, EXTENDED RELEASE ORAL at 08:56

## 2025-04-23 RX ADMIN — METOPROLOL TARTRATE 25 MG: 25 TABLET, FILM COATED ORAL at 21:56

## 2025-04-23 RX ADMIN — ONDANSETRON 4 MG: 2 INJECTION, SOLUTION INTRAMUSCULAR; INTRAVENOUS at 14:40

## 2025-04-23 RX ADMIN — APIXABAN 2.5 MG: 2.5 TABLET, FILM COATED ORAL at 21:56

## 2025-04-23 RX ADMIN — SERTRALINE HYDROCHLORIDE 25 MG: 25 TABLET ORAL at 21:56

## 2025-04-23 RX ADMIN — SODIUM CHLORIDE, PRESERVATIVE FREE 10 ML: 5 INJECTION INTRAVENOUS at 22:00

## 2025-04-23 RX ADMIN — SODIUM CHLORIDE, PRESERVATIVE FREE 10 ML: 5 INJECTION INTRAVENOUS at 21:57

## 2025-04-23 RX ADMIN — ONDANSETRON 4 MG: 4 TABLET, ORALLY DISINTEGRATING ORAL at 08:55

## 2025-04-23 RX ADMIN — METOPROLOL TARTRATE 25 MG: 25 TABLET, FILM COATED ORAL at 08:56

## 2025-04-23 ASSESSMENT — PAIN SCALES - GENERAL
PAINLEVEL_OUTOF10: 0

## 2025-04-23 NOTE — PROGRESS NOTES
[facility]  Progress Note    Patient: Schuyler Philippe Age: 79 y.o. : 1945 MR#: [id number] SSN: [id number]  Date/Time: 2025     Subjective:   Patient seen and examined at bedside.  Overall continues to be lethargic and weak.  Has no pain complaints.  Awaiting authorization for placement    Disposition SNF  Discharge 2025    Review of systems  General: No fevers or chills.  Cardiovascular: No chest pain or pressure. No palpitations.   Pulmonary: No shortness of breath, cough or wheeze.   Gastrointestinal: No abdominal pain, nausea, vomiting or diarrhea.   Genitourinary: No urinary frequency, urgency, hesitancy or dysuria.   Musculoskeletal: No joint or muscle pain, no back pain, no recent trauma.    Neurologic: No headache, numbness, tingling or weakness.   Assessment/Plan:   Acute hypoxic respiratory failure secondary to possible parapneumonic effusion and pneumonia  S/p right thoracentesis with removal of 1.2 L of yellow-colored fluid on 2025.  Cytology was negative for malignancy.  Pleural fluid cultures were negative.  MRSA screen came back negative.  - Patient has completed a course of Zosyn and vancomycin.  Antibiotics have been discontinued.  Pulmonology input appreciated  .  Pulmonary hygiene.  Antitussives.  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   Continue diltiazem for rate control  Continue apixaban for long-term anticoagulation     Anemia of chronic disease  -Hemoglobin stable.  Transfuse to keep hemoglobin greater than 7.  No signs symptoms of active bleed.  Iron studies reviewed.  B12 folate

## 2025-04-23 NOTE — PLAN OF CARE
Problem: Nutrition Deficit:  Goal: Optimize nutritional status  Outcome: Progressing  Flowsheets (Taken 4/23/2025 1415)  Nutrient intake appropriate for improving, restoring, or maintaining nutritional needs:   Assess nutritional status and recommend course of action   Monitor oral intake, labs, and treatment plans   Recommend appropriate diets, oral nutritional supplements, and vitamin/mineral supplements

## 2025-04-23 NOTE — PROGRESS NOTES
Bedside and Verbal shift change report given to TERESA Lin (oncoming nurse) by TERESA Ruby (offgoing nurse). Report included the following information Nurse Handoff Report and Adult Overview.

## 2025-04-23 NOTE — CARE COORDINATION
DIONICIO [facility] with Mahesh ( [facility], They requested the patient clinicals. DIONICIO faxed them to: [phone]     DIONICIO called to see if the fax were received no answer. DIONICIO left a VM.

## 2025-04-23 NOTE — PLAN OF CARE
Problem: Chronic Conditions and Co-morbidities  Goal: Patient's chronic conditions and co-morbidity symptoms are monitored and maintained or improved  4/22/2025 2218 by Flori Dumont RN  Outcome: Progressing  Flowsheets (Taken 4/22/2025 2218)  Care Plan - Patient's Chronic Conditions and Co-Morbidity Symptoms are Monitored and Maintained or Improved:   Monitor and assess patient's chronic conditions and comorbid symptoms for stability, deterioration, or improvement   Collaborate with multidisciplinary team to address chronic and comorbid conditions and prevent exacerbation or deterioration   Update acute care plan with appropriate goals if chronic or comorbid symptoms are exacerbated and prevent overall improvement and discharge  Note: Educate patient about Zoloft and use    4/22/2025 1738 by Delia Duarte RN  Outcome: Progressing  Flowsheets (Taken 4/22/2025 0900)  Care Plan - Patient's Chronic Conditions and Co-Morbidity Symptoms are Monitored and Maintained or Improved: Monitor and assess patient's chronic conditions and comorbid symptoms for stability, deterioration, or improvement  Note: Assessing patient for nausea and vomiting and giving patient medication as needed       Problem: Discharge Planning  Goal: Discharge to home or other facility with appropriate resources  4/22/2025 2218 by Flori Dumont, RN  Outcome: Progressing  Flowsheets (Taken 4/22/2025 2218)  Discharge to home or other facility with appropriate resources:   Identify discharge learning needs (meds, wound care, etc)   Identify barriers to discharge with patient and caregiver   Arrange for needed discharge resources and transportation as appropriate  Note: Involve patient in discharge plan  4/22/2025 1738 by Delia Duarte RN  Outcome: Progressing  Flowsheets (Taken 4/22/2025 0900)  Discharge to home or other facility with appropriate resources: Identify barriers to discharge with patient and caregiver  Note: Educating patient on patient  teaching     Problem: Skin/Tissue Integrity  Goal: Skin integrity remains intact  Description: 1.  Monitor for areas of redness and/or skin breakdown2.  Assess vascular access sites hourly3.  Every 4-6 hours minimum:  Change oxygen saturation probe site4.  Every 4-6 hours:  If on nasal continuous positive airway pressure, respiratory therapy assess nares and determine need for appliance change or resting period  4/22/2025 2218 by Flori Dumont, RN  Outcome: Progressing  Flowsheets (Taken 4/22/2025 2218)  Skin Integrity Remains Intact:   Every 4-6 hours:  If on nasal continuous positive airway pressure, assess nares and determine need for appliance change or resting period   Every 4-6 hours minimum:  Change oxygen saturation probe site   Monitor for areas of redness and/or skin breakdown   Turn and reposition as indicated  Note: Turn and reposition q2 hour  4/22/2025 1738 by Delia Duarte, RN  Outcome: Progressing  Flowsheets (Taken 4/22/2025 0900)  Skin Integrity Remains Intact: Assess vascular access sites hourly  Note: Patient skin intact and turned and repositioned frequently      Problem: Safety - Adult  Goal: Free from fall injury  4/22/2025 2218 by Flori Dumont, RN  Outcome: Progressing  Flowsheets (Taken 4/22/2025 2218)  Free From Fall Injury:   Based on caregiver fall risk screen, instruct family/caregiver to ask for assistance with transferring infant if caregiver noted to have fall risk factors   Instruct family/caregiver on patient safety  Note: Place bed alarm on  Place call light within reach  4/22/2025 1738 by Delia Duarte, RN  Outcome: Progressing  Flowsheets (Taken 4/21/2025 0915 by Ian Mcknight, RN)  Free From Fall Injury:   Instruct family/caregiver on patient safety   Based on caregiver fall risk screen, instruct family/caregiver to ask for assistance with transferring infant if caregiver noted to have fall risk factors  Note: Patient bed alarm on and call bell within reach      Problem:

## 2025-04-23 NOTE — CARE COORDINATION
CCOO called Joyce's [facility] [phone] no answer, Coco was unable to leave a Vm.     COCO called the patient COCO [place] to notify, No answer SW left a Vm.     Update: 11:14 pm    COCO called both Social Workers numbers no answer.

## 2025-04-23 NOTE — PLAN OF CARE
Problem: Physical Therapy - Adult  Goal: By Discharge: Performs mobility at highest level of function for planned discharge setting.  See evaluation for individualized goals.  Description: Physical Therapy Goals:  Initiated 4/16/2025 to be met within 7-10 days.    Re-evaluated 4/23/25     Initiated 4/23/2025 and to be accomplished within 7 day(s)    1.  Patient will maintain BLE ROM/strength via functional maintenance program exercises to prepare for OOB activity.      PLOF: Independent with ADLs and mobility prior to about 5 weeks ago. He has been in/out of the hospital for the past month. Lives alone.       Outcome: Progressing     PHYSICAL THERAPY RE-EVALUATION    Patient: Schuyler Philippe (79 y.o. male)  Date: 4/23/2025  Primary Diagnosis: Respiratory failure [J96.90]  Pleural effusion on right [J90]       Precautions: Fall Risk, General Precautions, Bed Alarm      ASSESSMENT :  Pt seen for re-evaluation s/p prolonged hospitalization and limited skilled participation in PT services. Pt requires max to total A for supine <> sit x 2 person assist this date. Self initiates lying back down after only a few seconds. Pt has been heavily educated on importance of mobility and role of therapy and continues to have limited participation. Will place pt on functional maintenance program to address LE ROM to maintain joint integrity in prep for OOB. Will continue to follow. RN updated. Bed alarm on for safety.   Recommend for next PT session:  Per FMP    DEFICITS/IMPAIRMENTS:     Body Structures, Functions, Activity Limitations Requiring Skilled Therapeutic Intervention: Decreased functional mobility ;Decreased strength;Decreased endurance;Decreased balance;Decreased tolerance to work activity    Patient will benefit from skilled intervention to address the above impairments.  Patient's rehabilitation potential/Therapy Prognosis: Fair.  Factors which may influence rehabilitation potential include:   []         None

## 2025-04-23 NOTE — PROGRESS NOTES
4 Eyes Skin Assessment     NAME:  Schuyler Philippe  YOB: 1945  MEDICAL RECORD NUMBER:  [id number]    The patient is being assessed for  Shift Handoff    I agree that at least one RN has performed a thorough Head to Toe Skin Assessment on the patient. ALL assessment sites listed below have been assessed.      Areas assessed by both nurses:    Head, Face, Ears, Shoulders, Back, Chest, Arms, Elbows, Hands, Sacrum. Buttock, Coccyx, Ischium, Legs. Feet and Heels, and Under Medical Devices         Does the Patient have a Wound? No noted wound(s)       Nate Prevention initiated by RN: Yes  Wound Care Orders initiated by RN: No    Pressure Injury (Stage 3,4, Unstageable, DTI, NWPT, and Complex wounds) if present, place Wound referral order by RN under : No    New Ostomies, if present place, Ostomy referral order under : No patient has ostomy     Nurse 1 eSignature: Electronically signed by Flori Dumont RN on 4/23/25 at 8:03 AM EDT    **SHARE this note so that the co-signing nurse can place an eSignature**    Nurse 2 eSignature: {Esignature:[id number]}

## 2025-04-23 NOTE — PLAN OF CARE
Problem: Occupational Therapy - Adult  Goal: By Discharge: Performs self-care activities at highest level of function for planned discharge setting.  See evaluation for individualized goals.  Description: Occupational Therapy Goals:  Initiated 4/16/2025 to be met within 7-10 days, re-evaluated 4/23/2025 - pt placed on FMP, will re-evaluate within 7-10 days.     1.  Patient will participate in functional maintenance program (FMP) to maintain/increase BUE ROM and strength for self-care/ADL tasks.    PLOF:  Independent with ADLs and mobility prior to about 5 weeks ago. He has been in/out of the hospital for the past month. Lives alone.     Outcome: Progressing  OCCUPATIONAL THERAPY RE-EVALUATION    Patient: Schuyler Philippe (79 y.o. male)  Date: 4/23/2025  Primary Diagnosis: Respiratory failure [J96.90]  Pleural effusion on right [J90]       Precautions: Fall Risk, General Precautions, Bed Alarm    ASSESSMENT :  Pt required Max encouragement to participate in OT. Max-nearly Total A x2 person to transition to sit EOB. Pt required constant support at EOB and continuously requesting to return to supine, only tolerated sitting for few seconds prior to self-initiated return to supine. Max Ax2 to reposition in bed for skin protection and comfort. Pt educated on importance of repositioning for skin protection, pt verbalized understanding. Pt was set-up to eat breakfast, was able to manipulate utensils with set-up. Due to minimal tolerance of OT interventions and decreased motivation pt will be placed on FMP at this time for BUE TherEx to work on gradually improving pt's tolerance of activity. Pt agreed with this plan. Reviewed POC with rehab tech, they demod understanding.    Recommend for next session:  Exercises with rehab tech    DEFICITS/IMPAIRMENTS:  Performance deficits / Impairments: Decreased functional mobility ;Decreased endurance;Decreased coordination;Decreased strength;Decreased cognition;Decreased safe  since last seen and reason for re-evaluation: Pt lisa limited progress towards goals and limited motivation to participate in therapy requiring encouragement for minimal participation which affects the amount of benefit he receives.  Past Medical History:   Diagnosis Date    A-fib (HCC)     Asbestosis (HCC)     Colon cancer (HCC)     Diabetes mellitus (HCC)     Hypertension     Prostate cancer (HCC)      Past Surgical History:   Procedure Laterality Date    APPENDECTOMY      COLECTOMY      with colostomy       Home Situation:   Social/Functional History  Lives With: Alone  Type of Home: House  Home Layout: One level  Home Access: Stairs to enter with rails  Entrance Stairs - Number of Steps: 2  Entrance Stairs - Rails: Both  Bathroom Shower/Tub: Tub/Shower unit, Shower chair with back  Bathroom Toilet: Standard  Bathroom Equipment: Shower chair  Bathroom Accessibility: Accessible  Home Equipment: Walker - Rolling, Wheelchair - Manual  Receives Help From: Family  Prior Level of Assist for ADLs: Independent  Prior Level of Assist for Homemaking: Independent  Homemaking Responsibilities: Yes  Prior Level of Assist for Transfers: Independent  Active : No  Patient's  Info: Brother drives him around  Mode of Transportation: Car  Occupation: Retired  []  Right hand dominant   []  Left hand dominant    Cognitive/Behavioral Status:  Orientation  Orientation Level: Oriented to person  Cognition  Overall Cognitive Status: Exceptions  Following Commands: Follows one step commands with increased time;Follows one step commands with repetition  Problem Solving: Assistance required to generate solutions;Assistance required to implement solutions  Initiation: Requires cues for all  Sequencing: Requires cues for some    Skin: visible skin intact  Edema: BLE    Vision/Perceptual:    Vision  Vision: Within Functional Limits         Coordination: BUE  Coordination: Generally decreased, functional        Balance:

## 2025-04-23 NOTE — PALLIATIVE CARE
Palliative Medicine Consult  [facility]: [phone] ([phone]  [facility]: [phone]    Goals of care defined.  Palliative team will sign off.  Please consult team as needed, if appropriate.   Thank you for the Palliative Medicine consult and allowing us to participate in the care of Schuyler Philippe.           CODE STATUS -DNR/DNI   DDNR on file       Tricia HITCHCOCK, RN  Palliative Medicine Inpatient RN  [facility]   Palliative [facility] Line: [phone]

## 2025-04-23 NOTE — PROGRESS NOTES
Comprehensive Nutrition Assessment    Type and Reason for Visit:  Initial, LOS    Nutrition Recommendations/Plan:   Add Glucerna TID with meals  Encourage PO intake, appetite is poor     Malnutrition Assessment:  Malnutrition Status:  Insufficient data at this time    Nutrition Assessment:    Pt admitted for pneumonia, pleural effusion. Pt s/p thoracentesis. Assessment for LOS. Palliative care has seen pt and defined goals of care. Pt ready for d/c to SNF awaiting authorization. Appetite is poor.    Nutrition Related Findings:    watery colostomy output 4/22 Wound Type: None       Current Nutrition Intake & Therapies:    Average Meal Intake: 1-25%, 26-50%  Average Supplements Intake: None Ordered  ADULT DIET; Easy to Chew; 3 carb choices (45 gm/meal)    Anthropometric Measures:  Height: 170 cm (5' 6.93\")  Ideal Body Weight (IBW): 148 lbs (67 kg)    Admission Body Weight: 81.6 kg (180 lb)  Current Body Weight: 93.9 kg (207 lb 0.2 oz), 139.9 % IBW.    Current BMI (kg/m2): 32.5  Usual Body Weight: 95.3 kg (210 lb)     % Weight Change (Calculated): -1.4  Weight Adjustment For: No Adjustment                 BMI Categories: Obese Class 1 (BMI 30.0-34.9)    Estimated Daily Nutrient Needs:  Energy Requirements Based On: Formula  Weight Used for Energy Requirements: Current  Energy (kcal/day): 3935-7230 kcal (MSJ x 1.2-1.5, cancer, increased)  Weight Used for Protein Requirements: Current  Protein (g/day):  g (1-1.2 g/kg, cancer increased protein needs)  Method Used for Fluid Requirements: 1 ml/kcal  Fluid (ml/day): 1633-5245 mL    Nutrition Diagnosis:   Inadequate oral intake related to inadequate protein-energy intake as evidenced by intake 26-50%  Increased nutrient needs related to increase demand for energy/nutrients as evidenced by  (cancer)    Nutrition Interventions:   Food and/or Nutrient Delivery: Continue Current Diet, Start Oral Nutrition Supplement  Nutrition Education/Counseling: No recommendation at this

## 2025-04-24 LAB
ALBUMIN SERPL-MCNC: 1.2 G/DL (ref 3.4–5)
ALBUMIN/GLOB SERPL: 0.2 (ref 0.8–1.7)
ALP SERPL-CCNC: 231 U/L (ref 45–117)
ALT SERPL-CCNC: 75 U/L (ref 16–61)
ANION GAP SERPL CALC-SCNC: 17 MMOL/L (ref 3–18)
AST SERPL-CCNC: 280 U/L (ref 10–38)
BASOPHILS # BLD: 0.04 K/UL (ref 0–0.1)
BASOPHILS NFR BLD: 0.4 % (ref 0–2)
BILIRUB SERPL-MCNC: 4.2 MG/DL (ref 0.2–1)
BUN SERPL-MCNC: 33 MG/DL (ref 7–18)
BUN/CREAT SERPL: 29 (ref 12–20)
CALCIUM SERPL-MCNC: 8.4 MG/DL (ref 8.5–10.1)
CHLORIDE SERPL-SCNC: 101 MMOL/L (ref 100–111)
CO2 SERPL-SCNC: 15 MMOL/L (ref 21–32)
CREAT SERPL-MCNC: 1.13 MG/DL (ref 0.6–1.3)
DIFFERENTIAL METHOD BLD: ABNORMAL
EOSINOPHIL # BLD: 0.01 K/UL (ref 0–0.4)
EOSINOPHIL NFR BLD: 0.1 % (ref 0–5)
ERYTHROCYTE [DISTWIDTH] IN BLOOD BY AUTOMATED COUNT: 29.5 % (ref 11.6–14.5)
GLOBULIN SER CALC-MCNC: 5.6 G/DL (ref 2–4)
GLUCOSE BLD STRIP.AUTO-MCNC: 112 MG/DL (ref 70–110)
GLUCOSE BLD STRIP.AUTO-MCNC: 127 MG/DL (ref 70–110)
GLUCOSE BLD STRIP.AUTO-MCNC: 131 MG/DL (ref 70–110)
GLUCOSE BLD STRIP.AUTO-MCNC: 137 MG/DL (ref 70–110)
GLUCOSE SERPL-MCNC: 96 MG/DL (ref 74–99)
HCT VFR BLD AUTO: 38.9 % (ref 36–48)
HGB BLD-MCNC: 12.2 G/DL (ref 13–16)
IMM GRANULOCYTES # BLD AUTO: 0.2 K/UL (ref 0–0.04)
IMM GRANULOCYTES NFR BLD AUTO: 1.9 % (ref 0–0.5)
LYMPHOCYTES # BLD: 0.52 K/UL (ref 0.9–3.6)
LYMPHOCYTES NFR BLD: 4.9 % (ref 21–52)
MCH RBC QN AUTO: 27.6 PG (ref 24–34)
MCHC RBC AUTO-ENTMCNC: 31.4 G/DL (ref 31–37)
MCV RBC AUTO: 88 FL (ref 78–100)
MONOCYTES # BLD: 1.03 K/UL (ref 0.05–1.2)
MONOCYTES NFR BLD: 9.7 % (ref 3–10)
NEUTS SEG # BLD: 8.81 K/UL (ref 1.8–8)
NEUTS SEG NFR BLD: 83 % (ref 40–73)
NRBC # BLD: 0.25 K/UL (ref 0–0.01)
NRBC BLD-RTO: 2.4 PER 100 WBC
PLATELET # BLD AUTO: 324 K/UL (ref 135–420)
PMV BLD AUTO: 11 FL (ref 9.2–11.8)
POTASSIUM SERPL-SCNC: 4.9 MMOL/L (ref 3.5–5.5)
PROT SERPL-MCNC: 6.8 G/DL (ref 6.4–8.2)
RBC # BLD AUTO: 4.42 M/UL (ref 4.35–5.65)
SODIUM SERPL-SCNC: 133 MMOL/L (ref 136–145)
WBC # BLD AUTO: 10.6 K/UL (ref 4.6–13.2)

## 2025-04-24 PROCEDURE — 6370000000 HC RX 637 (ALT 250 FOR IP): Performed by: STUDENT IN AN ORGANIZED HEALTH CARE EDUCATION/TRAINING PROGRAM

## 2025-04-24 PROCEDURE — 6370000000 HC RX 637 (ALT 250 FOR IP): Performed by: INTERNAL MEDICINE

## 2025-04-24 PROCEDURE — 80053 COMPREHEN METABOLIC PANEL: CPT

## 2025-04-24 PROCEDURE — 99232 SBSQ HOSP IP/OBS MODERATE 35: CPT | Performed by: INTERNAL MEDICINE

## 2025-04-24 PROCEDURE — 2500000003 HC RX 250 WO HCPCS: Performed by: INTERNAL MEDICINE

## 2025-04-24 PROCEDURE — 82962 GLUCOSE BLOOD TEST: CPT

## 2025-04-24 PROCEDURE — 1100000003 HC PRIVATE W/ TELEMETRY

## 2025-04-24 PROCEDURE — 2500000003 HC RX 250 WO HCPCS: Performed by: STUDENT IN AN ORGANIZED HEALTH CARE EDUCATION/TRAINING PROGRAM

## 2025-04-24 PROCEDURE — 2580000003 HC RX 258: Performed by: INTERNAL MEDICINE

## 2025-04-24 PROCEDURE — 94761 N-INVAS EAR/PLS OXIMETRY MLT: CPT

## 2025-04-24 PROCEDURE — 85025 COMPLETE CBC W/AUTO DIFF WBC: CPT

## 2025-04-24 PROCEDURE — 36415 COLL VENOUS BLD VENIPUNCTURE: CPT

## 2025-04-24 RX ADMIN — GUAIFENESIN 1200 MG: 600 TABLET, EXTENDED RELEASE ORAL at 20:27

## 2025-04-24 RX ADMIN — APIXABAN 2.5 MG: 2.5 TABLET, FILM COATED ORAL at 07:40

## 2025-04-24 RX ADMIN — SODIUM BICARBONATE: 84 INJECTION, SOLUTION INTRAVENOUS at 17:40

## 2025-04-24 RX ADMIN — GUAIFENESIN 1200 MG: 600 TABLET, EXTENDED RELEASE ORAL at 07:40

## 2025-04-24 RX ADMIN — SODIUM CHLORIDE, PRESERVATIVE FREE 10 ML: 5 INJECTION INTRAVENOUS at 20:28

## 2025-04-24 RX ADMIN — DILTIAZEM HYDROCHLORIDE 240 MG: 240 CAPSULE, EXTENDED RELEASE ORAL at 07:40

## 2025-04-24 RX ADMIN — METOPROLOL TARTRATE 25 MG: 25 TABLET, FILM COATED ORAL at 20:27

## 2025-04-24 RX ADMIN — APIXABAN 2.5 MG: 2.5 TABLET, FILM COATED ORAL at 20:27

## 2025-04-24 RX ADMIN — SODIUM CHLORIDE, PRESERVATIVE FREE 10 ML: 5 INJECTION INTRAVENOUS at 07:41

## 2025-04-24 RX ADMIN — METOPROLOL TARTRATE 25 MG: 25 TABLET, FILM COATED ORAL at 07:40

## 2025-04-24 RX ADMIN — SERTRALINE HYDROCHLORIDE 25 MG: 25 TABLET ORAL at 20:27

## 2025-04-24 ASSESSMENT — PAIN SCALES - GENERAL
PAINLEVEL_OUTOF10: 0

## 2025-04-24 NOTE — PROGRESS NOTES
potassium chloride (KLOR-CON M) extended release tablet 40 mEq  40 mEq Oral PRN    Or    potassium bicarb-citric acid (EFFER-K) effervescent tablet 40 mEq  40 mEq Oral PRN    Or    potassium chloride 10 mEq/100 mL IVPB (Peripheral Line)  10 mEq IntraVENous PRN    magnesium sulfate 2000 mg in 50 mL IVPB premix  2,000 mg IntraVENous PRN    ondansetron (ZOFRAN-ODT) disintegrating tablet 4 mg  4 mg Oral Q8H PRN    Or    ondansetron (ZOFRAN) injection 4 mg  4 mg IntraVENous Q6H PRN    polyethylene glycol (GLYCOLAX) packet 17 g  17 g Oral Daily PRN    acetaminophen (TYLENOL) tablet 650 mg  650 mg Oral Q6H PRN    Or    acetaminophen (TYLENOL) suppository 650 mg  650 mg Rectal Q6H PRN    [Held by provider] melatonin tablet 3 mg  3 mg Oral Nightly PRN    sodium chloride flush 0.9 % injection 5-40 mL  5-40 mL IntraVENous 2 times per day    sodium chloride flush 0.9 % injection 5-40 mL  5-40 mL IntraVENous PRN    0.9 % sodium chloride infusion   IntraVENous PRN    dilTIAZem (CARDIZEM CD) extended release capsule 240 mg  240 mg Oral Daily    insulin lispro (HUMALOG,ADMELOG) injection vial 0-4 Units  0-4 Units SubCUTAneous 4x Daily AC & HS    glucose chewable tablet 16 g  4 tablet Oral PRN    dextrose bolus 10% 125 mL  125 mL IntraVENous PRN    Or    dextrose bolus 10% 250 mL  250 mL IntraVENous PRN    glucagon injection 1 mg  1 mg SubCUTAneous PRN    dextrose 10 % infusion   IntraVENous Continuous PRN    ipratropium 0.5 mg-albuterol 2.5 mg (DUONEB) nebulizer solution 1 Dose  1 Dose Inhalation Q4H PRN       Labs:    Recent Results (from the past 24 hours)   POCT Glucose    Collection Time: 04/23/25  6:38 PM   Result Value Ref Range    POC Glucose 163 (H) 70 - 110 mg/dL   POCT Glucose    Collection Time: 04/23/25  9:43 PM   Result Value Ref Range    POC Glucose 140 (H) 70 - 110 mg/dL   Comprehensive Metabolic Panel    Collection Time: 04/24/25  3:03 AM   Result Value Ref Range    Sodium 133 (L) 136 - 145 mmol/L    Potassium 4.9  3.5 - 5.5 mmol/L    Chloride 101 100 - 111 mmol/L    CO2 15 (L) 21 - 32 mmol/L    Anion Gap 17 3.0 - 18 mmol/L    Glucose 96 74 - 99 mg/dL    BUN 33 (H) 7.0 - 18 MG/DL    Creatinine 1.13 0.6 - 1.3 MG/DL    BUN/Creatinine Ratio 29 (H) 12 - 20      Est, Glom Filt Rate 67 >60 ml/min/1.73m2    Calcium 8.4 (L) 8.5 - 10.1 MG/DL    Total Bilirubin 4.2 (H) 0.2 - 1.0 MG/DL    ALT 75 (H) 16 - 61 U/L     (H) 10 - 38 U/L    Alk Phosphatase 231 (H) 45 - 117 U/L    Total Protein 6.8 6.4 - 8.2 g/dL    Albumin 1.2 (L) 3.4 - 5.0 g/dL    Globulin 5.6 (H) 2.0 - 4.0 g/dL    Albumin/Globulin Ratio 0.2 (L) 0.8 - 1.7     CBC with Auto Differential    Collection Time: 04/24/25  3:03 AM   Result Value Ref Range    WBC 10.6 4.6 - 13.2 K/uL    RBC 4.42 4.35 - 5.65 M/uL    Hemoglobin 12.2 (L) 13.0 - 16.0 g/dL    Hematocrit 38.9 36.0 - 48.0 %    MCV 88.0 78.0 - 100.0 FL    MCH 27.6 24.0 - 34.0 PG    MCHC 31.4 31.0 - 37.0 g/dL    RDW 29.5 (H) 11.6 - 14.5 %    Platelets 324 135 - 420 K/uL    MPV 11.0 9.2 - 11.8 FL    Nucleated RBCs 2.4 (H) 0  WBC    nRBC 0.25 (H) 0.00 - 0.01 K/uL    Neutrophils % 83.0 (H) 40.0 - 73.0 %    Lymphocytes % 4.9 (L) 21.0 - 52.0 %    Monocytes % 9.7 3.0 - 10.0 %    Eosinophils % 0.1 0.0 - 5.0 %    Basophils % 0.4 0.0 - 2.0 %    Immature Granulocytes % 1.9 (H) 0.0 - 0.5 %    Neutrophils Absolute 8.81 (H) 1.80 - 8.00 K/UL    Lymphocytes Absolute 0.52 (L) 0.90 - 3.60 K/UL    Monocytes Absolute 1.03 0.05 - 1.20 K/UL    Eosinophils Absolute 0.01 0.00 - 0.40 K/UL    Basophils Absolute 0.04 0.00 - 0.10 K/UL    Immature Granulocytes Absolute 0.20 (H) 0.00 - 0.04 K/UL    Differential Type AUTOMATED     POCT Glucose    Collection Time: 04/24/25  8:44 AM   Result Value Ref Range    POC Glucose 112 (H) 70 - 110 mg/dL   POCT Glucose    Collection Time: 04/24/25 12:49 PM   Result Value Ref Range    POC Glucose 127 (H) 70 - 110 mg/dL       Signed By: Derrell Islas DO     April 24, 2025      Disclaimer: Sections of this

## 2025-04-24 NOTE — CARE COORDINATION
DIONICIO spoke with Joyce Mullins. 81Joy with [facility], she verified receiving the patient clinicals. She informed DIONICIO the screening team is reviewing.       Provider notified.

## 2025-04-24 NOTE — PLAN OF CARE
Problem: Chronic Conditions and Co-morbidities  Goal: Patient's chronic conditions and co-morbidity symptoms are monitored and maintained or improved  4/23/2025 2255 by Flori Dumont, RN  Outcome: Progressing  Flowsheets (Taken 4/23/2025 2244)  Care Plan - Patient's Chronic Conditions and Co-Morbidity Symptoms are Monitored and Maintained or Improved:   Monitor and assess patient's chronic conditions and comorbid symptoms for stability, deterioration, or improvement   Update acute care plan with appropriate goals if chronic or comorbid symptoms are exacerbated and prevent overall improvement and discharge  Note: Educate patient about medication and indication for the medication  4/23/2025 1558 by Delia Duarte, RN  Outcome: Progressing  Flowsheets (Taken 4/23/2025 0900)  Care Plan - Patient's Chronic Conditions and Co-Morbidity Symptoms are Monitored and Maintained or Improved: Monitor and assess patient's chronic conditions and comorbid symptoms for stability, deterioration, or improvement     Problem: Discharge Planning  Goal: Discharge to home or other facility with appropriate resources  4/23/2025 2255 by Flori Dumont, RN  Outcome: Progressing  Flowsheets (Taken 4/23/2025 2255)  Discharge to home or other facility with appropriate resources:   Arrange for interpreters to assist at discharge as needed   Identify discharge learning needs (meds, wound care, etc)   Arrange for needed discharge resources and transportation as appropriate  Note: Assess patient's length and stay and remain updated on barriers to discharge  4/23/2025 1558 by Delia Duarte, RN  Outcome: Progressing  Flowsheets (Taken 4/23/2025 0900)  Discharge to home or other facility with appropriate resources: Identify barriers to discharge with patient and caregiver     Problem: Skin/Tissue Integrity  Goal: Skin integrity remains intact  Description: 1.  Monitor for areas of redness and/or skin breakdown2.  Assess vascular access sites hourly3.

## 2025-04-24 NOTE — PLAN OF CARE
Problem: Chronic Conditions and Co-morbidities  Goal: Patient's chronic conditions and co-morbidity symptoms are monitored and maintained or improved  4/24/2025 0830 by Dorinda Lawson RN  Outcome: Progressing  Flowsheets (Taken 4/23/2025 2244 by Flori Dumont, RN)  Care Plan - Patient's Chronic Conditions and Co-Morbidity Symptoms are Monitored and Maintained or Improved:   Monitor and assess patient's chronic conditions and comorbid symptoms for stability, deterioration, or improvement   Update acute care plan with appropriate goals if chronic or comorbid symptoms are exacerbated and prevent overall improvement and discharge  Note: Patient with cancer to colon and prostate. Colonoscopy in place since 2023. Frequent emptying maintained and Q7 day changes  4/23/2025 2255 by Flori Dumont RN  Outcome: Progressing  Flowsheets (Taken 4/23/2025 2244)  Care Plan - Patient's Chronic Conditions and Co-Morbidity Symptoms are Monitored and Maintained or Improved:   Monitor and assess patient's chronic conditions and comorbid symptoms for stability, deterioration, or improvement   Update acute care plan with appropriate goals if chronic or comorbid symptoms are exacerbated and prevent overall improvement and discharge  Note: Educate patient about medication and indication for the medication     Problem: Discharge Planning  Goal: Discharge to home or other facility with appropriate resources  4/24/2025 0830 by Dorinda Lawson RN  Outcome: Progressing  Flowsheets (Taken 4/23/2025 2255 by Flori Dumont, RN)  Discharge to home or other facility with appropriate resources:   Arrange for interpreters to assist at discharge as needed   Identify discharge learning needs (meds, wound care, etc)   Arrange for needed discharge resources and transportation as appropriate  Note: Patient to go to snf once auth completed  4/23/2025 2255 by Flori Dumont, RN  Outcome: Progressing  Flowsheets (Taken 4/23/2025 2255)  Discharge to

## 2025-04-24 NOTE — PROGRESS NOTES
4 Eyes Skin Assessment     NAME:  Schyuler Philippe  YOB: 1945  MEDICAL RECORD NUMBER:  [id number]    The patient is being assessed for  Shift Handoff    I agree that at least one RN has performed a thorough Head to Toe Skin Assessment on the patient. ALL assessment sites listed below have been assessed.      Areas assessed by both nurses:    Head, Face, Ears, Shoulders, Back, Chest, Arms, Elbows, Hands, Sacrum. Buttock, Coccyx, Ischium, Legs. Feet and Heels, and Under Medical Devices         Does the Patient have a Wound? No noted wound(s)       Nate Prevention initiated by RN: Yes  Wound Care Orders initiated by RN: No    Pressure Injury (Stage 3,4, Unstageable, DTI, NWPT, and Complex wounds) if present, place Wound referral order by RN under : No    New Ostomies, if present place, Ostomy referral order under : No     Nurse 1 eSignature: Electronically signed by Flori Dumont RN on 4/24/25 at 7:41 AM EDT    **SHARE this note so that the co-signing nurse can place an eSignature**    Nurse 2 eSignature: Electronically signed by JIN CONNELL RN on 4/24/25 at 7:45 AM EDT

## 2025-04-25 PROBLEM — E86.0 DEHYDRATION: Status: RESOLVED | Noted: 2025-03-26 | Resolved: 2025-04-25

## 2025-04-25 LAB
ALBUMIN SERPL-MCNC: 1.2 G/DL (ref 3.4–5)
ALBUMIN/GLOB SERPL: 0.3 (ref 0.8–1.7)
ALP SERPL-CCNC: 224 U/L (ref 45–117)
ALT SERPL-CCNC: 81 U/L (ref 16–61)
ANION GAP SERPL CALC-SCNC: 10 MMOL/L (ref 3–18)
AST SERPL-CCNC: 307 U/L (ref 10–38)
BASOPHILS # BLD: 0.03 K/UL (ref 0–0.1)
BASOPHILS NFR BLD: 0.2 % (ref 0–2)
BILIRUB SERPL-MCNC: 4 MG/DL (ref 0.2–1)
BUN SERPL-MCNC: 36 MG/DL (ref 7–18)
BUN/CREAT SERPL: 32 (ref 12–20)
CALCIUM SERPL-MCNC: 8.4 MG/DL (ref 8.5–10.1)
CHLORIDE SERPL-SCNC: 101 MMOL/L (ref 100–111)
CO2 SERPL-SCNC: 20 MMOL/L (ref 21–32)
CREAT SERPL-MCNC: 1.11 MG/DL (ref 0.6–1.3)
DIFFERENTIAL METHOD BLD: ABNORMAL
EOSINOPHIL # BLD: 0.02 K/UL (ref 0–0.4)
EOSINOPHIL NFR BLD: 0.1 % (ref 0–5)
ERYTHROCYTE [DISTWIDTH] IN BLOOD BY AUTOMATED COUNT: 30.2 % (ref 11.6–14.5)
GLOBULIN SER CALC-MCNC: 4.8 G/DL (ref 2–4)
GLUCOSE BLD STRIP.AUTO-MCNC: 151 MG/DL (ref 70–110)
GLUCOSE BLD STRIP.AUTO-MCNC: 155 MG/DL (ref 70–110)
GLUCOSE SERPL-MCNC: 174 MG/DL (ref 74–99)
HCT VFR BLD AUTO: 32.1 % (ref 36–48)
HGB BLD-MCNC: 9.8 G/DL (ref 13–16)
IMM GRANULOCYTES # BLD AUTO: 0.19 K/UL (ref 0–0.04)
IMM GRANULOCYTES NFR BLD AUTO: 1.4 % (ref 0–0.5)
LYMPHOCYTES # BLD: 0.59 K/UL (ref 0.9–3.6)
LYMPHOCYTES NFR BLD: 4.3 % (ref 21–52)
MCH RBC QN AUTO: 26.4 PG (ref 24–34)
MCHC RBC AUTO-ENTMCNC: 30.5 G/DL (ref 31–37)
MCV RBC AUTO: 86.5 FL (ref 78–100)
MONOCYTES # BLD: 1.26 K/UL (ref 0.05–1.2)
MONOCYTES NFR BLD: 9.2 % (ref 3–10)
NEUTS SEG # BLD: 11.64 K/UL (ref 1.8–8)
NEUTS SEG NFR BLD: 84.8 % (ref 40–73)
NRBC # BLD: 0.2 K/UL (ref 0–0.01)
NRBC BLD-RTO: 1.5 PER 100 WBC
PLATELET # BLD AUTO: 292 K/UL (ref 135–420)
PMV BLD AUTO: 10.6 FL (ref 9.2–11.8)
POTASSIUM SERPL-SCNC: 3.7 MMOL/L (ref 3.5–5.5)
PROT SERPL-MCNC: 6 G/DL (ref 6.4–8.2)
RBC # BLD AUTO: 3.71 M/UL (ref 4.35–5.65)
SODIUM SERPL-SCNC: 131 MMOL/L (ref 136–145)
WBC # BLD AUTO: 13.7 K/UL (ref 4.6–13.2)

## 2025-04-25 PROCEDURE — 2500000003 HC RX 250 WO HCPCS: Performed by: INTERNAL MEDICINE

## 2025-04-25 PROCEDURE — 1100000003 HC PRIVATE W/ TELEMETRY

## 2025-04-25 PROCEDURE — 99233 SBSQ HOSP IP/OBS HIGH 50: CPT | Performed by: NURSE PRACTITIONER

## 2025-04-25 PROCEDURE — 2500000003 HC RX 250 WO HCPCS: Performed by: STUDENT IN AN ORGANIZED HEALTH CARE EDUCATION/TRAINING PROGRAM

## 2025-04-25 PROCEDURE — 82962 GLUCOSE BLOOD TEST: CPT

## 2025-04-25 PROCEDURE — 36415 COLL VENOUS BLD VENIPUNCTURE: CPT

## 2025-04-25 PROCEDURE — 80053 COMPREHEN METABOLIC PANEL: CPT

## 2025-04-25 PROCEDURE — 6370000000 HC RX 637 (ALT 250 FOR IP): Performed by: INTERNAL MEDICINE

## 2025-04-25 PROCEDURE — 6370000000 HC RX 637 (ALT 250 FOR IP): Performed by: STUDENT IN AN ORGANIZED HEALTH CARE EDUCATION/TRAINING PROGRAM

## 2025-04-25 PROCEDURE — 6360000002 HC RX W HCPCS: Performed by: INTERNAL MEDICINE

## 2025-04-25 PROCEDURE — 85025 COMPLETE CBC W/AUTO DIFF WBC: CPT

## 2025-04-25 PROCEDURE — 94761 N-INVAS EAR/PLS OXIMETRY MLT: CPT

## 2025-04-25 PROCEDURE — 6360000002 HC RX W HCPCS: Performed by: STUDENT IN AN ORGANIZED HEALTH CARE EDUCATION/TRAINING PROGRAM

## 2025-04-25 RX ADMIN — SODIUM CHLORIDE, PRESERVATIVE FREE 10 ML: 5 INJECTION INTRAVENOUS at 22:39

## 2025-04-25 RX ADMIN — APIXABAN 2.5 MG: 2.5 TABLET, FILM COATED ORAL at 22:37

## 2025-04-25 RX ADMIN — METOPROLOL TARTRATE 25 MG: 25 TABLET, FILM COATED ORAL at 09:34

## 2025-04-25 RX ADMIN — DILTIAZEM HYDROCHLORIDE 240 MG: 240 CAPSULE, EXTENDED RELEASE ORAL at 09:34

## 2025-04-25 RX ADMIN — SERTRALINE HYDROCHLORIDE 25 MG: 25 TABLET ORAL at 22:38

## 2025-04-25 RX ADMIN — METOPROLOL TARTRATE 25 MG: 25 TABLET, FILM COATED ORAL at 22:38

## 2025-04-25 RX ADMIN — GUAIFENESIN 1200 MG: 600 TABLET, EXTENDED RELEASE ORAL at 09:34

## 2025-04-25 RX ADMIN — SODIUM CHLORIDE, PRESERVATIVE FREE 10 ML: 5 INJECTION INTRAVENOUS at 22:38

## 2025-04-25 RX ADMIN — APIXABAN 2.5 MG: 2.5 TABLET, FILM COATED ORAL at 09:34

## 2025-04-25 RX ADMIN — SODIUM CHLORIDE, PRESERVATIVE FREE 10 ML: 5 INJECTION INTRAVENOUS at 09:34

## 2025-04-25 RX ADMIN — ONDANSETRON 4 MG: 2 INJECTION, SOLUTION INTRAMUSCULAR; INTRAVENOUS at 20:31

## 2025-04-25 RX ADMIN — BENZONATATE 200 MG: 100 CAPSULE ORAL at 22:37

## 2025-04-25 RX ADMIN — GUAIFENESIN 1200 MG: 600 TABLET, EXTENDED RELEASE ORAL at 22:37

## 2025-04-25 RX ADMIN — CEFTRIAXONE 1000 MG: 1 INJECTION, POWDER, FOR SOLUTION INTRAMUSCULAR; INTRAVENOUS at 23:29

## 2025-04-25 ASSESSMENT — PAIN SCALES - GENERAL
PAINLEVEL_OUTOF10: 0

## 2025-04-25 NOTE — CARE COORDINATION
[phone] ext. [phone], patient clinicals were sent this morning to be reviewed. Case management team will continue to follow.    Leslie DELACRUZW   Case Management

## 2025-04-25 NOTE — CONSULTS
Palliative Medicine  Patient Name: Schuyler Philippe  YOB: 1945  MRN: [id number]  Age: 79 y.o.  Gender: male    Date of Initial Consult: 4/16/2025   Reconsulted 4/25/2025   Date of Service: 4/25/2025  Time: 2:46 PM  Provider: ROMULO Ashley NP  Hospital Day: 11  Admit Date: 4/15/2025  Referring Provider: Dr Burns        Reasons for Consultation:  Goals of Care    HISTORY OF PRESENT ILLNESS (HPI):   Schuyler Philippe is a 79 y.o. male with a past medical history of hypertension, atrial fin on Eliquis, colon cancer with mets to the liver and lung , who was admitted on 4/15/2025 from [facility]  with a diagnosis of Pleural effusion . Patient initially presented to [facility] with shortness of breath. He was found to have a right pleural effusion and transferred to [facility] for a dell level of care. Palliative medicine is consulted for goals of care.     4/25/2025 Reconsulted on Mr Philippe due to health decline, increased WBC. Today very weak appearing, more frail appearing. Sleepy not engaging with us  as he has previously been. Family including brother at bedside.     4/16/2025 Patient is alert and oriented x 3. Currently denies pain and shortness of breath. He is on nasal cannula oxygen. Anxious for thoracentesis.          PALLIATIVE DIAGNOSES:    Encounter for palliative care / advanced care plan discussions  Goals of care   Pleural effusion   Metastatic cancer of the colon   Anemia of chronic disease     ASSESSMENT AND PLAN:   Encounter for palliative care / advanced care plan discussions    April 25, 2025 Re consulted today in patient with increased weakness, frailness. Appears to be declining despite medical treatment. Appears thinner. Brother Siddhartha at bedside. Appreciate attending conversations with patient and brother. Hospice broached. Brother feels Mr Philippe is worse and this would be best course of action for his comfort. However difficult for us to assess if Schuyler

## 2025-04-25 NOTE — PLAN OF CARE
Problem: Chronic Conditions and Co-morbidities  Goal: Patient's chronic conditions and co-morbidity symptoms are monitored and maintained or improved  Outcome: Progressing  Flowsheets (Taken 4/23/2025 2244 by Flori Dumont, RN)  Care Plan - Patient's Chronic Conditions and Co-Morbidity Symptoms are Monitored and Maintained or Improved:   Monitor and assess patient's chronic conditions and comorbid symptoms for stability, deterioration, or improvement   Update acute care plan with appropriate goals if chronic or comorbid symptoms are exacerbated and prevent overall improvement and discharge     Problem: Discharge Planning  Goal: Discharge to home or other facility with appropriate resources  Outcome: Progressing  Flowsheets (Taken 4/23/2025 2255 by Flori Dumont, RN)  Discharge to home or other facility with appropriate resources:   Arrange for interpreters to assist at discharge as needed   Identify discharge learning needs (meds, wound care, etc)   Arrange for needed discharge resources and transportation as appropriate     Problem: Skin/Tissue Integrity  Goal: Skin integrity remains intact  Description: 1.  Monitor for areas of redness and/or skin breakdown2.  Assess vascular access sites hourly3.  Every 4-6 hours minimum:  Change oxygen saturation probe site4.  Every 4-6 hours:  If on nasal continuous positive airway pressure, respiratory therapy assess nares and determine need for appliance change or resting period  Outcome: Progressing  Flowsheets (Taken 4/23/2025 2255 by Flori Dumont, RN)  Skin Integrity Remains Intact:   Turn and reposition as indicated   Pressure redistribution bed/mattress (bed type)   Check visual cues for pain     Problem: Safety - Adult  Goal: Free from fall injury  Outcome: Progressing  Flowsheets (Taken 4/23/2025 2255 by Flori Dumont, RN)  Free From Fall Injury:   Based on caregiver fall risk screen, instruct family/caregiver to ask for assistance with transferring infant if

## 2025-04-25 NOTE — PLAN OF CARE
Problem: Chronic Conditions and Co-morbidities  Goal: Patient's chronic conditions and co-morbidity symptoms are monitored and maintained or improved  4/25/2025 1331 by Guanaco Joya RN  Outcome: Progressing  4/25/2025 1211 by Kamala Enriquez RN  Outcome: Progressing  Flowsheets (Taken 4/23/2025 2244 by Flori Dumont, RN)  Care Plan - Patient's Chronic Conditions and Co-Morbidity Symptoms are Monitored and Maintained or Improved:   Monitor and assess patient's chronic conditions and comorbid symptoms for stability, deterioration, or improvement   Update acute care plan with appropriate goals if chronic or comorbid symptoms are exacerbated and prevent overall improvement and discharge     Problem: Discharge Planning  Goal: Discharge to home or other facility with appropriate resources  4/25/2025 1331 by Guanaco Joya RN  Outcome: Progressing  4/25/2025 1211 by Kamala Enriquez RN  Outcome: Progressing  Flowsheets (Taken 4/23/2025 2255 by Flori Dumont, RN)  Discharge to home or other facility with appropriate resources:   Arrange for interpreters to assist at discharge as needed   Identify discharge learning needs (meds, wound care, etc)   Arrange for needed discharge resources and transportation as appropriate     Problem: Skin/Tissue Integrity  Goal: Skin integrity remains intact  Description: 1.  Monitor for areas of redness and/or skin breakdown2.  Assess vascular access sites hourly3.  Every 4-6 hours minimum:  Change oxygen saturation probe site4.  Every 4-6 hours:  If on nasal continuous positive airway pressure, respiratory therapy assess nares and determine need for appliance change or resting period  4/25/2025 1331 by Guanaco Joya RN  Outcome: Progressing  4/25/2025 1211 by Kamala Enriquez RN  Outcome: Progressing  Flowsheets (Taken 4/23/2025 2255 by Flori Dumont, RN)  Skin Integrity Remains Intact:   Turn and reposition as indicated   Pressure redistribution bed/mattress (bed

## 2025-04-25 NOTE — PALLIATIVE CARE
Palliative Medicine     Palliative Medicine team Ashley Vidales, LARRY and Tricia Tubbs RN received a call from Attending MD stating patient functional status currently is weak and lethargic. He requested for Palliative team to reengage with patient and his brother for goals of care ands introduction of hospice support. The plan for discharge is skilled nursing post hospital.       Mr Philippe is known to this department for this admission. He is more frail appearing, sleepy and not as engaging with us as he has previously been. Family including brother at bedside.  Introduced the support of hospice.  Mr Siddhartha Philippe,  Brother feels Mr Philippe is worse and would be best served if his care was focused on one of comfort. Patient is hard to read and it is difficult to know if he understands as he would not engage, kept eyes closed and was teary.     Team spoke with Mr. Siddhartha Philippe, primary MPOA outside room. Mr. MALA Philippe is aware of is brother's declining health just over these past days and wishes for him to be comfortable. Siddhartha admits Payan may not completely understand or accept his health decline at this time.  If patient is still here on Monday will meet with patient and brother at 10 am. If he is d/c'd without hospice referral Siddhartha can speak to SNF SW and attending ( if patient unable to make this decision ) and ask for hospice to be consulted. Goals of care DNR/DNI continue medical treatment at current time.        Tricia DELACRUZN, RN  Palliative Medicine Inpatient RN  Palliative [facility] Line: [phone]

## 2025-04-25 NOTE — PROGRESS NOTES
Advance Care Planning   Healthcare Decision Maker:    Primary Decision Maker: Siddhartha Philippe - Brother/Sister - [phone]    Click here to complete Healthcare Decision Makers including selection of the Healthcare Decision Maker Relationship (ie \"Primary\").  Patient received visit from Spiritual Care Partner for Schuyler Philippe, who is a 79 y.o.,male.      The  provided the following Interventions:  Continued the relationship of care and support.   Chart reviewed.    The following outcomes were achieved:  Patient received visit from Spiritual Care Partner providing information on Spiritual Health and Chaplains at [facility].    Assessment:  There are no further spiritual or Islam issues which require Spiritual Care Services interventions at this time.     Plan:  Chaplains will continue to follow and will provide pastoral care on an as needed/requested basis.   recommends bedside caregivers page  on duty if patient shows signs of acute spiritual or emotional distress.        Toni Kapoor  Spiritual Care   [phone]     Spiritual Health History and Assessment/Progress Note  [facility]    Follow-up, Emotional distress,  ,      Name: Schuyler Philippe MRN: [id number]    Age: 79 y.o.     Sex: male   Language: English   Anglican: Other   Pleural effusion on right     Date: 4/25/2025            Total Time Calculated: 3 min              Spiritual Assessment continued in [facility] 2S TELEMETRY        Referral/Consult From: Multi-disciplinary team   Encounter Overview/Reason: Follow-up  Service Provided For: Patient    Prabha, Belief, Meaning:   Patient unable to assess at this time  Family/Friends have beliefs or practices that help with coping during difficult times      Importance and Influence:  Patient unable to assess at this time  Family/Friends have spiritual/personal beliefs that influence decisions regarding the patient's health    Community:  Patient unable to  assess at this time  Family/Friends are connected with a spiritual community: and feel well-supported. Support system includes: Extended family    Assessment and Plan of Care:     Patient Interventions include:   Family/Friends Interventions include: Facilitated expression of thoughts and feelings and Explored spiritual coping/struggle/distress    Patient Plan of Care: Spiritual Care available upon further referral  Family/Friends Plan of Care: Spiritual Care available upon further referral    Electronically signed by ROBERT Barkley on 4/25/2025 at 2:42 PM

## 2025-04-26 LAB
ALBUMIN SERPL-MCNC: 1.4 G/DL (ref 3.4–5)
ALBUMIN/GLOB SERPL: 0.2 (ref 0.8–1.7)
ALP SERPL-CCNC: 273 U/L (ref 45–117)
ALT SERPL-CCNC: 95 U/L (ref 16–61)
ANION GAP SERPL CALC-SCNC: 9 MMOL/L (ref 3–18)
AST SERPL-CCNC: 318 U/L (ref 10–38)
BASOPHILS # BLD: 0.03 K/UL (ref 0–0.1)
BASOPHILS NFR BLD: 0.2 % (ref 0–2)
BILIRUB SERPL-MCNC: 5 MG/DL (ref 0.2–1)
BUN SERPL-MCNC: 36 MG/DL (ref 7–18)
BUN/CREAT SERPL: 31 (ref 12–20)
CALCIUM SERPL-MCNC: 8.9 MG/DL (ref 8.5–10.1)
CHLORIDE SERPL-SCNC: 101 MMOL/L (ref 100–111)
CO2 SERPL-SCNC: 21 MMOL/L (ref 21–32)
CREAT SERPL-MCNC: 1.16 MG/DL (ref 0.6–1.3)
DIFFERENTIAL METHOD BLD: ABNORMAL
EOSINOPHIL # BLD: 0.02 K/UL (ref 0–0.4)
EOSINOPHIL NFR BLD: 0.1 % (ref 0–5)
ERYTHROCYTE [DISTWIDTH] IN BLOOD BY AUTOMATED COUNT: 30.9 % (ref 11.6–14.5)
GLOBULIN SER CALC-MCNC: 5.7 G/DL (ref 2–4)
GLUCOSE BLD STRIP.AUTO-MCNC: 129 MG/DL (ref 70–110)
GLUCOSE BLD STRIP.AUTO-MCNC: 143 MG/DL (ref 70–110)
GLUCOSE BLD STRIP.AUTO-MCNC: 149 MG/DL (ref 70–110)
GLUCOSE BLD STRIP.AUTO-MCNC: 158 MG/DL (ref 70–110)
GLUCOSE SERPL-MCNC: 136 MG/DL (ref 74–99)
HCT VFR BLD AUTO: 39.3 % (ref 36–48)
HGB BLD-MCNC: 12.4 G/DL (ref 13–16)
IMM GRANULOCYTES # BLD AUTO: 0.26 K/UL (ref 0–0.04)
IMM GRANULOCYTES NFR BLD AUTO: 1.7 % (ref 0–0.5)
LYMPHOCYTES # BLD: 0.43 K/UL (ref 0.9–3.6)
LYMPHOCYTES NFR BLD: 2.8 % (ref 21–52)
MCH RBC QN AUTO: 28.1 PG (ref 24–34)
MCHC RBC AUTO-ENTMCNC: 31.6 G/DL (ref 31–37)
MCV RBC AUTO: 88.9 FL (ref 78–100)
MONOCYTES # BLD: 1.27 K/UL (ref 0.05–1.2)
MONOCYTES NFR BLD: 8.2 % (ref 3–10)
NEUTS SEG # BLD: 13.49 K/UL (ref 1.8–8)
NEUTS SEG NFR BLD: 87 % (ref 40–73)
NRBC # BLD: 0.26 K/UL (ref 0–0.01)
NRBC BLD-RTO: 1.7 PER 100 WBC
PLATELET # BLD AUTO: 260 K/UL (ref 135–420)
PLATELET COMMENT: ABNORMAL
PMV BLD AUTO: 10.7 FL (ref 9.2–11.8)
POTASSIUM SERPL-SCNC: 3.9 MMOL/L (ref 3.5–5.5)
PROT SERPL-MCNC: 7.1 G/DL (ref 6.4–8.2)
RBC # BLD AUTO: 4.42 M/UL (ref 4.35–5.65)
RBC MORPH BLD: ABNORMAL
SODIUM SERPL-SCNC: 131 MMOL/L (ref 136–145)
WBC # BLD AUTO: 15.5 K/UL (ref 4.6–13.2)

## 2025-04-26 PROCEDURE — 2500000003 HC RX 250 WO HCPCS: Performed by: STUDENT IN AN ORGANIZED HEALTH CARE EDUCATION/TRAINING PROGRAM

## 2025-04-26 PROCEDURE — 94761 N-INVAS EAR/PLS OXIMETRY MLT: CPT

## 2025-04-26 PROCEDURE — 80053 COMPREHEN METABOLIC PANEL: CPT

## 2025-04-26 PROCEDURE — 1100000003 HC PRIVATE W/ TELEMETRY

## 2025-04-26 PROCEDURE — 99232 SBSQ HOSP IP/OBS MODERATE 35: CPT | Performed by: INTERNAL MEDICINE

## 2025-04-26 PROCEDURE — 6370000000 HC RX 637 (ALT 250 FOR IP): Performed by: STUDENT IN AN ORGANIZED HEALTH CARE EDUCATION/TRAINING PROGRAM

## 2025-04-26 PROCEDURE — 6360000002 HC RX W HCPCS: Performed by: INTERNAL MEDICINE

## 2025-04-26 PROCEDURE — 6370000000 HC RX 637 (ALT 250 FOR IP): Performed by: INTERNAL MEDICINE

## 2025-04-26 PROCEDURE — 82962 GLUCOSE BLOOD TEST: CPT

## 2025-04-26 PROCEDURE — 36415 COLL VENOUS BLD VENIPUNCTURE: CPT

## 2025-04-26 PROCEDURE — 85025 COMPLETE CBC W/AUTO DIFF WBC: CPT

## 2025-04-26 PROCEDURE — 2500000003 HC RX 250 WO HCPCS: Performed by: INTERNAL MEDICINE

## 2025-04-26 RX ADMIN — APIXABAN 2.5 MG: 2.5 TABLET, FILM COATED ORAL at 08:31

## 2025-04-26 RX ADMIN — SODIUM CHLORIDE, PRESERVATIVE FREE 10 ML: 5 INJECTION INTRAVENOUS at 21:14

## 2025-04-26 RX ADMIN — SODIUM CHLORIDE, PRESERVATIVE FREE 10 ML: 5 INJECTION INTRAVENOUS at 09:15

## 2025-04-26 RX ADMIN — SERTRALINE HYDROCHLORIDE 25 MG: 25 TABLET ORAL at 21:02

## 2025-04-26 RX ADMIN — CEFTRIAXONE 1000 MG: 1 INJECTION, POWDER, FOR SOLUTION INTRAMUSCULAR; INTRAVENOUS at 23:25

## 2025-04-26 RX ADMIN — GUAIFENESIN 1200 MG: 600 TABLET, EXTENDED RELEASE ORAL at 08:31

## 2025-04-26 RX ADMIN — OXYCODONE HYDROCHLORIDE 5 MG: 5 TABLET ORAL at 08:30

## 2025-04-26 RX ADMIN — SODIUM CHLORIDE, PRESERVATIVE FREE 10 ML: 5 INJECTION INTRAVENOUS at 21:15

## 2025-04-26 RX ADMIN — APIXABAN 2.5 MG: 2.5 TABLET, FILM COATED ORAL at 21:02

## 2025-04-26 RX ADMIN — SODIUM CHLORIDE, PRESERVATIVE FREE 10 ML: 5 INJECTION INTRAVENOUS at 09:16

## 2025-04-26 RX ADMIN — GUAIFENESIN 1200 MG: 600 TABLET, EXTENDED RELEASE ORAL at 21:02

## 2025-04-26 RX ADMIN — METOPROLOL TARTRATE 25 MG: 25 TABLET, FILM COATED ORAL at 21:02

## 2025-04-26 ASSESSMENT — PAIN DESCRIPTION - LOCATION: LOCATION: BACK;ARM

## 2025-04-26 ASSESSMENT — PAIN SCALES - GENERAL
PAINLEVEL_OUTOF10: 0
PAINLEVEL_OUTOF10: 8
PAINLEVEL_OUTOF10: 0
PAINLEVEL_OUTOF10: 0
PAINLEVEL_OUTOF10: 8
PAINLEVEL_OUTOF10: 0
PAINLEVEL_OUTOF10: 0

## 2025-04-26 ASSESSMENT — PAIN - FUNCTIONAL ASSESSMENT: PAIN_FUNCTIONAL_ASSESSMENT: ACTIVITIES ARE NOT PREVENTED

## 2025-04-26 NOTE — PROGRESS NOTES
Pt did not eat Breakfast or lunch, He has had 240 cc of orange juice and 1/2  container of applesauce.

## 2025-04-26 NOTE — PROGRESS NOTES
0.9 % sodium chloride infusion   IntraVENous PRN    potassium chloride (KLOR-CON M) extended release tablet 40 mEq  40 mEq Oral PRN    Or    potassium bicarb-citric acid (EFFER-K) effervescent tablet 40 mEq  40 mEq Oral PRN    Or    potassium chloride 10 mEq/100 mL IVPB (Peripheral Line)  10 mEq IntraVENous PRN    magnesium sulfate 2000 mg in 50 mL IVPB premix  2,000 mg IntraVENous PRN    ondansetron (ZOFRAN-ODT) disintegrating tablet 4 mg  4 mg Oral Q8H PRN    Or    ondansetron (ZOFRAN) injection 4 mg  4 mg IntraVENous Q6H PRN    polyethylene glycol (GLYCOLAX) packet 17 g  17 g Oral Daily PRN    acetaminophen (TYLENOL) tablet 650 mg  650 mg Oral Q6H PRN    Or    acetaminophen (TYLENOL) suppository 650 mg  650 mg Rectal Q6H PRN    [Held by provider] melatonin tablet 3 mg  3 mg Oral Nightly PRN    sodium chloride flush 0.9 % injection 5-40 mL  5-40 mL IntraVENous 2 times per day    sodium chloride flush 0.9 % injection 5-40 mL  5-40 mL IntraVENous PRN    0.9 % sodium chloride infusion   IntraVENous PRN    dilTIAZem (CARDIZEM CD) extended release capsule 240 mg  240 mg Oral Daily    insulin lispro (HUMALOG,ADMELOG) injection vial 0-4 Units  0-4 Units SubCUTAneous 4x Daily AC & HS    glucose chewable tablet 16 g  4 tablet Oral PRN    dextrose bolus 10% 125 mL  125 mL IntraVENous PRN    Or    dextrose bolus 10% 250 mL  250 mL IntraVENous PRN    glucagon injection 1 mg  1 mg SubCUTAneous PRN    dextrose 10 % infusion   IntraVENous Continuous PRN    ipratropium 0.5 mg-albuterol 2.5 mg (DUONEB) nebulizer solution 1 Dose  1 Dose Inhalation Q4H PRN       Labs:    Recent Results (from the past 24 hours)   Comprehensive Metabolic Panel    Collection Time: 04/25/25  4:23 AM   Result Value Ref Range    Sodium 131 (L) 136 - 145 mmol/L    Potassium 3.7 3.5 - 5.5 mmol/L    Chloride 101 100 - 111 mmol/L    CO2 20 (L) 21 - 32 mmol/L    Anion Gap 10 3.0 - 18 mmol/L    Glucose 174 (H) 74 - 99 mg/dL    BUN 36 (H) 7.0 - 18 MG/DL

## 2025-04-26 NOTE — PROGRESS NOTES
phonetic based errors in grammar and contents. Even though attempts were made to correct all the mistakes, some may have been missed, and remained in the body of the document. If questions arise, please contact our department.    Signed By: Hernando Upton MD     April 26, 2025

## 2025-04-27 LAB
GLUCOSE BLD STRIP.AUTO-MCNC: 116 MG/DL (ref 70–110)
GLUCOSE BLD STRIP.AUTO-MCNC: 117 MG/DL (ref 70–110)
GLUCOSE BLD STRIP.AUTO-MCNC: 124 MG/DL (ref 70–110)
GLUCOSE BLD STRIP.AUTO-MCNC: 124 MG/DL (ref 70–110)

## 2025-04-27 PROCEDURE — 6370000000 HC RX 637 (ALT 250 FOR IP): Performed by: STUDENT IN AN ORGANIZED HEALTH CARE EDUCATION/TRAINING PROGRAM

## 2025-04-27 PROCEDURE — 99232 SBSQ HOSP IP/OBS MODERATE 35: CPT | Performed by: INTERNAL MEDICINE

## 2025-04-27 PROCEDURE — 82962 GLUCOSE BLOOD TEST: CPT

## 2025-04-27 PROCEDURE — 2500000003 HC RX 250 WO HCPCS: Performed by: STUDENT IN AN ORGANIZED HEALTH CARE EDUCATION/TRAINING PROGRAM

## 2025-04-27 PROCEDURE — 1100000003 HC PRIVATE W/ TELEMETRY

## 2025-04-27 PROCEDURE — 6360000002 HC RX W HCPCS: Performed by: INTERNAL MEDICINE

## 2025-04-27 PROCEDURE — 6370000000 HC RX 637 (ALT 250 FOR IP): Performed by: INTERNAL MEDICINE

## 2025-04-27 PROCEDURE — 2500000003 HC RX 250 WO HCPCS: Performed by: INTERNAL MEDICINE

## 2025-04-27 RX ADMIN — APIXABAN 2.5 MG: 2.5 TABLET, FILM COATED ORAL at 20:51

## 2025-04-27 RX ADMIN — SODIUM CHLORIDE, PRESERVATIVE FREE 10 ML: 5 INJECTION INTRAVENOUS at 09:30

## 2025-04-27 RX ADMIN — APIXABAN 2.5 MG: 2.5 TABLET, FILM COATED ORAL at 09:28

## 2025-04-27 RX ADMIN — SODIUM CHLORIDE, PRESERVATIVE FREE 10 ML: 5 INJECTION INTRAVENOUS at 09:29

## 2025-04-27 RX ADMIN — METOPROLOL TARTRATE 25 MG: 25 TABLET, FILM COATED ORAL at 20:51

## 2025-04-27 RX ADMIN — SODIUM CHLORIDE, PRESERVATIVE FREE 10 ML: 5 INJECTION INTRAVENOUS at 20:52

## 2025-04-27 RX ADMIN — METOPROLOL TARTRATE 25 MG: 25 TABLET, FILM COATED ORAL at 09:28

## 2025-04-27 RX ADMIN — SODIUM CHLORIDE, PRESERVATIVE FREE 10 ML: 5 INJECTION INTRAVENOUS at 20:51

## 2025-04-27 RX ADMIN — SERTRALINE HYDROCHLORIDE 25 MG: 25 TABLET ORAL at 20:51

## 2025-04-27 RX ADMIN — CEFTRIAXONE 1000 MG: 1 INJECTION, POWDER, FOR SOLUTION INTRAMUSCULAR; INTRAVENOUS at 23:30

## 2025-04-27 RX ADMIN — GUAIFENESIN 1200 MG: 600 TABLET, EXTENDED RELEASE ORAL at 20:50

## 2025-04-27 RX ADMIN — DILTIAZEM HYDROCHLORIDE 240 MG: 240 CAPSULE, EXTENDED RELEASE ORAL at 09:27

## 2025-04-27 ASSESSMENT — PAIN SCALES - GENERAL
PAINLEVEL_OUTOF10: 0

## 2025-04-27 NOTE — PLAN OF CARE
Problem: Chronic Conditions and Co-morbidities  Goal: Patient's chronic conditions and co-morbidity symptoms are monitored and maintained or improved  Outcome: Progressing  Flowsheets (Taken 4/23/2025 2244 by Flori Dumont, RN)  Care Plan - Patient's Chronic Conditions and Co-Morbidity Symptoms are Monitored and Maintained or Improved:   Monitor and assess patient's chronic conditions and comorbid symptoms for stability, deterioration, or improvement   Update acute care plan with appropriate goals if chronic or comorbid symptoms are exacerbated and prevent overall improvement and discharge     Problem: Discharge Planning  Goal: Discharge to home or other facility with appropriate resources  Outcome: Progressing  Flowsheets (Taken 4/23/2025 2255 by Flori Dumont, RN)  Discharge to home or other facility with appropriate resources:   Arrange for interpreters to assist at discharge as needed   Identify discharge learning needs (meds, wound care, etc)   Arrange for needed discharge resources and transportation as appropriate     Problem: Skin/Tissue Integrity  Goal: Skin integrity remains intact  Description: 1.  Monitor for areas of redness and/or skin breakdown2.  Assess vascular access sites hourly3.  Every 4-6 hours minimum:  Change oxygen saturation probe site4.  Every 4-6 hours:  If on nasal continuous positive airway pressure, respiratory therapy assess nares and determine need for appliance change or resting period  Outcome: Progressing  Flowsheets (Taken 4/26/2025 2018)  Skin Integrity Remains Intact: Turn and reposition as indicated     Problem: Safety - Adult  Goal: Free from fall injury  Outcome: Progressing  Flowsheets (Taken 4/23/2025 2255 by Flori Dumont, RN)  Free From Fall Injury:   Based on caregiver fall risk screen, instruct family/caregiver to ask for assistance with transferring infant if caregiver noted to have fall risk factors   Instruct family/caregiver on patient safety     Problem:

## 2025-04-27 NOTE — PROGRESS NOTES
[facility] Hospitalist Group  Progress Note    Patient: Schuyler Philippe Age: 79 y.o. : 1945 MR#: [id number] SSN: [id number]  Date/Time: 2025     C/C: Shortness of breath         HPI : 79-year-old male past medical history of colon cancer s/p colectomy and colostomy, mets to liver presented to [facility] with increasing shortness of breath, this was his second visit there, during first visit he was diagnosed with pneumonia and possible parapneumonic effusion discharged on antibiotics came back with increasing shortness of breath was transferred to [facility] for increasing pleural effusion.  1.2 L of yellow-colored fluid was extracted during pleural tap on 2025 cytology was negative cultures were negative still possible fluid collection secondary to malignancy.  Patient also had atrial fibrillation with rapid ventricular rate which resolved with Cardizem and currently on apixaban.  Overall patient's condition is very poor and deteriorating fast.  Palliative care consultation is done patient currently is DNR/DNI may go for hospice care          Subjective:  Patient is very teary this time  I am unable to get definitive information why he is upset  Does not appear to be in any particular distress           Review of Systems:  Proper ROS cannot be obtained  Assessment/Plan:     1.  Acute hypoxic respiratory failure requiring initially BiPAP    2 pleural effusion s/p drainage transudate    3 colon cancer with metastasis to liver    4 permanent atrial fibrillation with rapid ventricular rate    5 anemia of chronic illness    PLAN:  2025  - Patient appears to be at baseline  - No respiratory distress noted  - Blood pressure is at fairly controlled , on room air O2 saturation is 96%  - Elevation in WBC count so far cultures are negative    2025  - Continue current treatment  Palliative care note reviewed-   - Consideration is for possible comfort

## 2025-04-27 NOTE — PROGRESS NOTES
4 Eyes Skin Assessment     NAME:  Schuyler Philippe  YOB: 1945  MEDICAL RECORD NUMBER:  [id number]    The patient is being assessed for  Shift Handoff    I agree that at least one RN has performed a thorough Head to Toe Skin Assessment on the patient. ALL assessment sites listed below have been assessed.      Areas assessed by both nurses:    Head, Face, Ears, Shoulders, Back, Chest, Arms, Elbows, Hands, Sacrum. Buttock, Coccyx, Ischium, Legs. Feet and Heels, and Under Medical Devices         Does the Patient have a Wound? No noted wound(s)       Nate Prevention initiated by RN: No  Wound Care Orders initiated by RN: No    Pressure Injury (Stage 3,4, Unstageable, DTI, NWPT, and Complex wounds) if present, place Wound referral order by RN under : No    New Ostomies, if present place, Ostomy referral order under : Yes     Nurse 1 eSignature: Electronically signed by Gloria Clarke RN on 4/27/25 at 6:56 AM EDT    **SHARE this note so that the co-signing nurse can place an eSignature**    Nurse 2 eSignature: Electronically signed by Umberto Harris RN on 4/27/25 at 08:11 AM EDT

## 2025-04-27 NOTE — PLAN OF CARE
Problem: Chronic Conditions and Co-morbidities  Goal: Patient's chronic conditions and co-morbidity symptoms are monitored and maintained or improved  4/27/2025 1329 by Umberto Harris RN  Outcome: Progressing  Flowsheets (Taken 4/23/2025 2244 by Flori Dumont, RN)  Care Plan - Patient's Chronic Conditions and Co-Morbidity Symptoms are Monitored and Maintained or Improved:   Monitor and assess patient's chronic conditions and comorbid symptoms for stability, deterioration, or improvement   Update acute care plan with appropriate goals if chronic or comorbid symptoms are exacerbated and prevent overall improvement and discharge     Problem: Discharge Planning  Goal: Discharge to home or other facility with appropriate resources  4/27/2025 1329 by Umberto Harris RN  Outcome: Progressing  Flowsheets (Taken 4/23/2025 2255 by Flori Dumont, RN)  Discharge to home or other facility with appropriate resources:   Arrange for interpreters to assist at discharge as needed   Identify discharge learning needs (meds, wound care, etc)   Arrange for needed discharge resources and transportation as appropriate     Problem: Skin/Tissue Integrity  Goal: Skin integrity remains intact  Description: 1.  Monitor for areas of redness and/or skin breakdown2.  Assess vascular access sites hourly3.  Every 4-6 hours minimum:  Change oxygen saturation probe site4.  Every 4-6 hours:  If on nasal continuous positive airway pressure, respiratory therapy assess nares and determine need for appliance change or resting period  4/27/2025 1329 by Umberto Harris RN  Outcome: Progressing  Flowsheets (Taken 4/27/2025 1329)  Skin Integrity Remains Intact:   Monitor for areas of redness and/or skin breakdown   Turn and reposition as indicated     Problem: Safety - Adult  Goal: Free from fall injury  4/27/2025 1329 by Umberto Harris, RN  Outcome: Progressing  Flowsheets (Taken 4/23/2025 2255 by Flori Dumont, RN)  Free From Fall Injury:

## 2025-04-27 NOTE — PROGRESS NOTES
4 Eyes Skin Assessment     NAME:  Schuyler Philippe  YOB: 1945  MEDICAL RECORD NUMBER:  [id number]    The patient is being assessed for  Shift Handoff    I agree that at least one RN has performed a thorough Head to Toe Skin Assessment on the patient. ALL assessment sites listed below have been assessed.      Areas assessed by both nurses:    Head, Face, Ears, Shoulders, Back, Chest, Arms, Elbows, Hands, Sacrum. Buttock, Coccyx, Ischium, and Legs. Feet and Heels        Does the Patient have a Wound? No noted wound(s)       Nate Prevention initiated by RN: Yes  Wound Care Orders initiated by RN: No    Pressure Injury (Stage 3,4, Unstageable, DTI, NWPT, and Complex wounds) if present, place Wound referral order by RN under : No    New Ostomies, if present place, Ostomy referral order under : No     Nurse 1 eSignature: Electronically signed by Umberto Harris RN on 4/27/25 at 7:04 PM EDT    **SHARE this note so that the co-signing nurse can place an eSignature**    Nurse 2 eSignature: Electronically signed by Gloria Clarke RN on 4/27/25 at 7:38 PM EDT

## 2025-04-28 LAB
ANION GAP SERPL CALC-SCNC: 8 MMOL/L (ref 3–18)
BACTERIA SPEC CULT: NORMAL
BUN SERPL-MCNC: 36 MG/DL (ref 7–18)
BUN/CREAT SERPL: 29 (ref 12–20)
CALCIUM SERPL-MCNC: 9 MG/DL (ref 8.5–10.1)
CHLORIDE SERPL-SCNC: 101 MMOL/L (ref 100–111)
CO2 SERPL-SCNC: 24 MMOL/L (ref 21–32)
CREAT SERPL-MCNC: 1.24 MG/DL (ref 0.6–1.3)
ERYTHROCYTE [DISTWIDTH] IN BLOOD BY AUTOMATED COUNT: 32.5 % (ref 11.6–14.5)
GLUCOSE BLD STRIP.AUTO-MCNC: 113 MG/DL (ref 70–110)
GLUCOSE BLD STRIP.AUTO-MCNC: 123 MG/DL (ref 70–110)
GLUCOSE BLD STRIP.AUTO-MCNC: 124 MG/DL (ref 70–110)
GLUCOSE BLD STRIP.AUTO-MCNC: 125 MG/DL (ref 70–110)
GLUCOSE BLD STRIP.AUTO-MCNC: 142 MG/DL (ref 70–110)
GLUCOSE SERPL-MCNC: 121 MG/DL (ref 74–99)
HCT VFR BLD AUTO: 43 % (ref 36–48)
HGB BLD-MCNC: 13.2 G/DL (ref 13–16)
MCH RBC QN AUTO: 27.1 PG (ref 24–34)
MCHC RBC AUTO-ENTMCNC: 30.7 G/DL (ref 31–37)
MCV RBC AUTO: 88.3 FL (ref 78–100)
NRBC # BLD: 0.36 K/UL (ref 0–0.01)
NRBC BLD-RTO: 2.8 PER 100 WBC
PLATELET # BLD AUTO: 251 K/UL (ref 135–420)
PMV BLD AUTO: 11.9 FL (ref 9.2–11.8)
POTASSIUM SERPL-SCNC: 3.7 MMOL/L (ref 3.5–5.5)
RBC # BLD AUTO: 4.87 M/UL (ref 4.35–5.65)
SERVICE CMNT-IMP: NORMAL
SODIUM SERPL-SCNC: 133 MMOL/L (ref 136–145)
WBC # BLD AUTO: 12.8 K/UL (ref 4.6–13.2)

## 2025-04-28 PROCEDURE — 92610 EVALUATE SWALLOWING FUNCTION: CPT

## 2025-04-28 PROCEDURE — 85027 COMPLETE CBC AUTOMATED: CPT

## 2025-04-28 PROCEDURE — 80048 BASIC METABOLIC PNL TOTAL CA: CPT

## 2025-04-28 PROCEDURE — 99233 SBSQ HOSP IP/OBS HIGH 50: CPT | Performed by: NURSE PRACTITIONER

## 2025-04-28 PROCEDURE — 94761 N-INVAS EAR/PLS OXIMETRY MLT: CPT

## 2025-04-28 PROCEDURE — 1100000003 HC PRIVATE W/ TELEMETRY

## 2025-04-28 PROCEDURE — 92526 ORAL FUNCTION THERAPY: CPT

## 2025-04-28 PROCEDURE — 6370000000 HC RX 637 (ALT 250 FOR IP): Performed by: STUDENT IN AN ORGANIZED HEALTH CARE EDUCATION/TRAINING PROGRAM

## 2025-04-28 PROCEDURE — 36415 COLL VENOUS BLD VENIPUNCTURE: CPT

## 2025-04-28 PROCEDURE — 99232 SBSQ HOSP IP/OBS MODERATE 35: CPT | Performed by: INTERNAL MEDICINE

## 2025-04-28 PROCEDURE — 2500000003 HC RX 250 WO HCPCS: Performed by: STUDENT IN AN ORGANIZED HEALTH CARE EDUCATION/TRAINING PROGRAM

## 2025-04-28 PROCEDURE — 82962 GLUCOSE BLOOD TEST: CPT

## 2025-04-28 PROCEDURE — 6370000000 HC RX 637 (ALT 250 FOR IP): Performed by: INTERNAL MEDICINE

## 2025-04-28 RX ORDER — MULTIVITAMIN WITH IRON
1 TABLET ORAL DAILY
Status: DISCONTINUED | OUTPATIENT
Start: 2025-04-28 | End: 2025-05-02

## 2025-04-28 RX ADMIN — APIXABAN 2.5 MG: 2.5 TABLET, FILM COATED ORAL at 22:35

## 2025-04-28 RX ADMIN — SODIUM CHLORIDE, PRESERVATIVE FREE 10 ML: 5 INJECTION INTRAVENOUS at 09:13

## 2025-04-28 RX ADMIN — SERTRALINE HYDROCHLORIDE 25 MG: 25 TABLET ORAL at 22:35

## 2025-04-28 RX ADMIN — METOPROLOL TARTRATE 25 MG: 25 TABLET, FILM COATED ORAL at 22:35

## 2025-04-28 RX ADMIN — DILTIAZEM HYDROCHLORIDE 240 MG: 240 CAPSULE, EXTENDED RELEASE ORAL at 09:12

## 2025-04-28 RX ADMIN — GUAIFENESIN 1200 MG: 600 TABLET, EXTENDED RELEASE ORAL at 22:36

## 2025-04-28 RX ADMIN — METOPROLOL TARTRATE 25 MG: 25 TABLET, FILM COATED ORAL at 09:12

## 2025-04-28 RX ADMIN — APIXABAN 2.5 MG: 2.5 TABLET, FILM COATED ORAL at 09:12

## 2025-04-28 RX ADMIN — SODIUM CHLORIDE, PRESERVATIVE FREE 10 ML: 5 INJECTION INTRAVENOUS at 09:12

## 2025-04-28 RX ADMIN — SODIUM CHLORIDE, PRESERVATIVE FREE 10 ML: 5 INJECTION INTRAVENOUS at 22:37

## 2025-04-28 RX ADMIN — SODIUM CHLORIDE, PRESERVATIVE FREE 10 ML: 5 INJECTION INTRAVENOUS at 22:36

## 2025-04-28 RX ADMIN — GUAIFENESIN 1200 MG: 600 TABLET, EXTENDED RELEASE ORAL at 09:12

## 2025-04-28 ASSESSMENT — PAIN SCALES - GENERAL
PAINLEVEL_OUTOF10: 0
PAINLEVEL_OUTOF10: 0

## 2025-04-28 NOTE — PROGRESS NOTES
Comprehensive Nutrition Assessment    Type and Reason for Visit:  Reassess    Nutrition Recommendations/Plan:   Continue current diet as tolerated.  Order Magic Cup (each provides 290 kcal, 9g protein) BID  Daily wts.  Plan to order MVI supplementation 2/2 poor PO intake.   Continue to monitor tolerance of PO, compliance of oral supplements, weight, labs, and plan of care during admission.     Malnutrition Assessment:  Malnutrition Status:  Insufficient data (poor PO intake, NFPE deferred to follow-up) (04/28/25 1616)    Context:  Acute Illness       Nutrition Assessment:    Pt admitted for pneumonia, pleural effusion. Pt s/p thoracentesis 4/16- 1.2 L removed. Palliative care following, last note 4/28- hospice consult placed; GOC DNR/DNI, continue medical interventions with hospice at d/c. SLP following, last note 4/28- full, honey-thick liquids. Poor intake continues, 0-25% x 5 entries. Plan to order oral nutrition supplements to encourage PO intake. Continue to monitor PO calorie/protein intake during admission.    Nutrition Related Findings:    Pertinent Meds:   Reviewed. Pertinent Labs:  Recent Labs     04/26/25  1315 04/28/25  0710   GLUCOSE 136* 121*   BUN 36* 36*   CREATININE 1.16 1.24   * 133*   K 3.9 3.7    101   CO2 21 24   CALCIUM 8.9 9.0     Recent Labs     04/26/25  2108 04/27/25  0819 04/27/25  1225 04/27/25  1629 04/27/25  2103 04/28/25  0507 04/28/25  0807 04/28/25  1150   POCGLU 143* 124* 124* 116* 117* 123* 113* 124*       Last BM: 04/28/25    Skin: Wound Type: None    Edema:    Right lower extremity, Left lower extremity   Edema Generalized: +1        RLE Edema: +1  LLE Edema: +1     Current Nutrition Intake & Therapies:    Average Meal Intake: 0%, 1-25%  Average Supplements Intake: None Ordered  ADULT ORAL NUTRITION SUPPLEMENT; Breakfast, Lunch, Dinner; Diabetic Oral Supplement  ADULT DIET; Full Liquid; Moderately Thick (Honey)    Anthropometric Measures:  Height: 170 cm (5'

## 2025-04-28 NOTE — ACP (ADVANCE CARE PLANNING)
Advance Care Planning     Palliative Team Advance Care Planning (ACP) Conversation    Date of Conversation: 04/28/25     ACP documents on file prior to discussion:  -Power of  for Healthcare  -Portable DNR    Healthcare Decision Maker:    Primary Decision Maker: Siddhartha Philippe - Brother/Sister - [phone]     Conversation Summary:      Follow up visit made to patient along with Palliative team member JULIANA Vidales NP.  Patient resting quietly in bed, drowsy but alerts to name. Able to answer several questions by shaking his head in response. He did shake head \"yes\" when asked if he wants to focus on his comfort rather than therapy at this time. 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    Spoke with brother in hallway. Brother feels much better that the patient was able to \"agree\" on being comfortable and having hospice support. Brother would like the patient to go to [facility] since it is closer for him to visit.  made aware of family request and hospice consult.  Bedside RN and Dr Upton made aware of our conversation and hospice consult.       Palliative team remains available to provide support to Mr Philippe and his family during this hospital stay.    Resuscitation Status:   Code Status: DNR     Documentation Completed:  -No new documents completed.    Tia Sevilla RN  Palliative Medicine Inpatient RN  Palliative [place] [facility]: [phone]

## 2025-04-28 NOTE — PROGRESS NOTES
Physical Therapy Functional Maintenance Program     Patient completed ROM as follows.    Bilateral Lower Extremity Exercise:      EXERCISE   Sets   Reps   Active Active Assist   Passive   Comments   Ankle Pumps  5  [] [] [x]    Heel Slides  5  [] [] [x]    Hip Abd/adduction  5 [] [] [x]    Quad Sets   [] [] [] Hold for 5 secs   Glut Sets   [] [] [] Hold for 5 secs   Knee extension  5 [] [] [x]    Straight Leg Raises  5 [] [] [x]      Pain:  Intensity Pre-treatment: 0/10   Intensity Post-treatment: 0/10        [x]  Alerted nurse.   [x]  Call bell within patient reach.   [x]  Patient resting in bed with no apparent distress .     Thank you,  Adina Marquis, Rehab Technician

## 2025-04-28 NOTE — PROGRESS NOTES
Occupational Therapy Functional Maintenance Program     Patient completed ROM as follows.    Bilateral Upper Extremity Exercise:      EXERCISE   Sets   Reps   Active Active Assist   Passive   Comments   Shoulder horizontal abduction   5  [] [] [x]     Shoulder flexion    5 [] [] [x]     Elbow flex/ext    5 [] [] [x]     Wrist flexion/extension    5 [] [] [x]     Hand flex/ext    5 [] [] [x]     Wrist radial/ulnar deviation    5 [] [] [x]     Wrist sup/pro  5 [] [] [x]      Pain:  Intensity Pre-treatment: 0/10   Intensity Post-treatment: 0/10      [x]  Alerted nurse.   [x]  Call bell within patient reach.   [x]  Patient resting in bed with no apparent distress .     Thank you,  Adina Marquis, Rehab Technician

## 2025-04-28 NOTE — PROGRESS NOTES
Family is asking for a medication for sore throat. I ask pt if he wanted pain meds and he stated no.

## 2025-04-28 NOTE — CARE COORDINATION
DIONICIO returned Ericka with the [facility] EXT[phone] call, On the VM she was checking on the status of the patient. to check on the patient DIONICIO left a VM asking her to return DIONICIO call.

## 2025-04-28 NOTE — PLAN OF CARE
Problem: SLP Adult - Impaired Swallowing  Goal: By Discharge: Advance to least restrictive diet without signs or symptoms of aspiration for planned discharge setting.  See evaluation for individualized goals.  Description:   Patient will:  1. Tolerate PO trials with 0 s/s overt distress in 4/5 trials  2. Utilize compensatory swallow strategies/maneuvers (decrease bite/sip, size/rate, alt. liq/sol) with min cues in 4/5 trials  3. Perform oral-motor/laryngeal exercises to increase oropharyngeal swallow function with min cues  4. Complete an objective swallow study (i.e., MBSS) to assess swallow integrity, r/o aspiration, and determine of safest LRD, min A as indicated/ordered by MD     Rec:     Full honey-thick liquids  Aspiration precautions  HOB >45 during po intake, remain >30 for 30-45 minutes after po   Small sips; liquids with slow feeding rate   Oral care TID  Meds in puree   MBS 4/29/25    Outcome: Progressing     SPEECH LANGUAGE PATHOLOGY BEDSIDE SWALLOW EVALUATION/TREATMENT    Patient: Schuyler Philippe (79 y.o. male)  Date: 4/28/2025  Primary Diagnosis: Respiratory failure (HCC) [J96.90]  Pleural effusion on right [J90]       Precautions: Aspiration  PLOF: As per H&P  ASSESSMENT:  Based on the objective data described below, the patient presents with moderate pharyngeal dysphagia. Rn reports choking with thin liquids. Before PO trials pt reports pain in throat. Pt with immediate wet congested/gurgly cough following x1 teaspoon of thin liquids. Pt with increased control of honey-thick liquids. Pt tolerated consecutive sips of honey-thick liquids via teaspoon without any overt s/sx of aspiration. Pt's initiation to swallow was delayed and laryngeal elevation was weak/decreased to palpation. Pt declined puree solids and breakfast tray and stated, \"I don't want any solids\". MBS ordered for 4/29/25 to further assess oropharyngeal swallow and determine the LRD. Recommend full honey-thick liquids, aspiration

## 2025-04-28 NOTE — PROGRESS NOTES
4 Eyes Skin Assessment     NAME:  Schuyler Philippe  YOB: 1945  MEDICAL RECORD NUMBER:  [id number]    The patient is being assessed for  Shift Handoff    I agree that at least one RN has performed a thorough Head to Toe Skin Assessment on the patient. ALL assessment sites listed below have been assessed.      Areas assessed by both nurses:    Head, Face, Ears, Shoulders, Back, Chest, Arms, Elbows, Hands, Sacrum. Buttock, Coccyx, Ischium, Legs. Feet and Heels, and Under Medical Devices         Does the Patient have a Wound? No noted wound(s)       Nate Prevention initiated by RN: No  Wound Care Orders initiated by RN: No    Pressure Injury (Stage 3,4, Unstageable, DTI, NWPT, and Complex wounds) if present, place Wound referral order by RN under : No    New Ostomies, if present place, Ostomy referral order under : Yes     Nurse 1 eSignature: Electronically signed by Gloria Clarke RN on 4/28/25 at 6:08 AM EDT    **SHARE this note so that the co-signing nurse can place an eSignature**    Nurse 2 eSignature: {Esignature:[id number]}

## 2025-04-28 NOTE — PROGRESS NOTES
[facility] Hospitalist Group  Progress Note    Patient: Schuyler Philippe Age: 79 y.o. : 1945 MR#: [id number] SSN: [id number]  Date/Time: 2025     C/C: Shortness of breath         HPI : 79-year-old male past medical history of colon cancer s/p colectomy and colostomy, mets to liver presented to [facility] with increasing shortness of breath, this was his second visit there, during first visit he was diagnosed with pneumonia and possible parapneumonic effusion discharged on antibiotics came back with increasing shortness of breath was transferred to [facility] for increasing pleural effusion.  1.2 L of yellow-colored fluid was extracted during pleural tap on 2025 cytology was negative cultures were negative still possible fluid collection secondary to malignancy.  Patient also had atrial fibrillation with rapid ventricular rate which resolved with Cardizem and currently on apixaban.  Overall patient's condition is very poor and deteriorating fast.  Palliative care consultation is done patient currently is DNR/DNI may go for hospice care          Subjective:  Patient not in distress  Patient is awake but orientation is questionable           Review of Systems:  Proper ROS cannot be obtained  Assessment/Plan:     1.  Acute hypoxic respiratory failure requiring initially BiPAP    2 pleural effusion s/p drainage transudate    3 colon cancer with metastasis to liver    4 permanent atrial fibrillation with rapid ventricular rate    5 anemia of chronic illness    PLAN:  2025  - Clinically stable  - Discussed with palliative-patient's goals of care have been changed-hospice care consult placed  - No new issues  - Continue other medical management as ordered      2025  - Patient appears to be at baseline  - No respiratory distress noted  - Blood pressure is at fairly controlled , on room air O2 saturation is 96%  - Elevation in WBC count so far cultures

## 2025-04-28 NOTE — CONSULTS
Palliative Medicine  Patient Name: Schuyler Philippe  YOB: 1945  MRN: [id number]  Age: 79 y.o.  Gender: male    Date of Initial Consult: 4/16/2025   Reconsulted 4/25/2025   Date of Service: 4/28/2025  Time: 12:53 PM  Provider: ROMULO Ashley NP  Hospital Day: 14  Admit Date: 4/15/2025  Referring Provider: Dr Burns        Reasons for Consultation:  Goals of Care    HISTORY OF PRESENT ILLNESS (HPI):   Schuyler Philippe is a 79 y.o. male with a past medical history of hypertension, atrial fin on Eliquis, colon cancer with mets to the liver and lung , who was admitted on 4/15/2025 from [facility]  with a diagnosis of Pleural effusion . Patient initially presented to [facility] with shortness of breath. He was found to have a right pleural effusion and transferred to [facility] for a dell level of care. Palliative medicine is consulted for goals of care.     4/28/2025 Patient is very fatigued sleepy but alerts. Calm this am. Met with his brother Siddhartha and patient. Hospice consult placed.     4/25/2025 Reconsulted on Mr Philippe due to health decline, increased WBC. Today very weak appearing, more frail appearing. Sleepy not engaging with us  as he has previously been. Family including brother at bedside.     4/16/2025 Patient is alert and oriented x 3. Currently denies pain and shortness of breath. He is on nasal cannula oxygen. Anxious for thoracentesis.          PALLIATIVE DIAGNOSES:    Encounter for palliative care / advanced care plan discussions  Goals of care   Pleural effusion   Metastatic cancer of the colon   Anemia of chronic disease     ASSESSMENT AND PLAN:   Encounter for palliative care / advanced care plan discussions    April 28, 2025 Patient seen along with Ms Di RN. Mr Philippe lying in bed. He appears very weak and fatigued this am. He would alert to his name and answer a few questions for us. He told our team he wanted to be comfortable and focus on his symptoms and  Caregiver [] Pt Lives at Facility  If \"Yes\" to discuss with social work during IDT    Anticipatory grief assessment:   [x] Normal  / [] Maladaptive     If \"Maladaptive\" to discuss with social work during IDT         LAB AND IMAGING FINDINGS:   Objective data reviewed:  labs, images, records, medication use, vitals, and chart     FINAL COMMENTS   Thank you for allowing Palliative Medicine to participate in the care of Schuyler Philippe.    Only check if applicable and billing time based rather than MDM  [x] The total encounter time on this service date was 50__ minutes which was spent performing a face-to-face encounter and personally completing the provider-level activities documented in the note. This includes time spent prior to the visit and after the visit in direct care of the patient. This time does not include time spent in any separately reportable services.    Electronically signed by   ROMULO Ashley NP  Palliative Care Team  on 4/28/2025 at 12:53 PM

## 2025-04-28 NOTE — CARE COORDINATION
DIONICIO spoke pt brother Siddhartha  (PDM) discuss pt discharge plan.    Siddhartha agreeable pt discharging to [facility] with hospice.      DIONICIO SAFEMAIL Siddhartha choice list for hospice.    [email or web address]       Pt hospice order uploaded via [employer].    Addendum  15:53 DIONICIO called Siddhartha to follow up if received hospice choice list, Siddhartha received unable to open email.    Siddhartha asked for  choice list to left in pt room. DIONICIO left a copy with (Eddie)  Siddhartha twin brother.    Norm Barakat BSW  Case Management

## 2025-04-28 NOTE — PLAN OF CARE
Problem: Metabolic/Fluid and Electrolytes - Adult  Goal: Electrolytes maintained within normal limits  Outcome: Progressing  Note: Will monitor and manage fluid intake. Labs are being closely monitored and electrolytes are replaced as needed.      Problem: Nutrition Deficit:  Goal: Optimize nutritional status  Outcome: Progressing  Flowsheets (Taken 4/28/2025 3158)  Nutrient intake appropriate for improving, restoring, or maintaining nutritional needs: Assess nutritional status and recommend course of action  Note: Pt has a poor appetite. Will continue to set up tray and attempt to feed. Pt is admitted for Pleural effusion.

## 2025-04-29 ENCOUNTER — APPOINTMENT (OUTPATIENT)
Facility: HOSPITAL | Age: 80
End: 2025-04-29
Attending: FAMILY MEDICINE
Payer: OTHER GOVERNMENT

## 2025-04-29 LAB
GLUCOSE BLD STRIP.AUTO-MCNC: 117 MG/DL (ref 70–110)
GLUCOSE BLD STRIP.AUTO-MCNC: 118 MG/DL (ref 70–110)
GLUCOSE BLD STRIP.AUTO-MCNC: 128 MG/DL (ref 70–110)
GLUCOSE BLD STRIP.AUTO-MCNC: 130 MG/DL (ref 70–110)

## 2025-04-29 PROCEDURE — 6370000000 HC RX 637 (ALT 250 FOR IP): Performed by: INTERNAL MEDICINE

## 2025-04-29 PROCEDURE — 74230 X-RAY XM SWLNG FUNCJ C+: CPT

## 2025-04-29 PROCEDURE — 92611 MOTION FLUOROSCOPY/SWALLOW: CPT

## 2025-04-29 PROCEDURE — 6370000000 HC RX 637 (ALT 250 FOR IP): Performed by: STUDENT IN AN ORGANIZED HEALTH CARE EDUCATION/TRAINING PROGRAM

## 2025-04-29 PROCEDURE — 1100000003 HC PRIVATE W/ TELEMETRY

## 2025-04-29 PROCEDURE — 2500000003 HC RX 250 WO HCPCS: Performed by: STUDENT IN AN ORGANIZED HEALTH CARE EDUCATION/TRAINING PROGRAM

## 2025-04-29 PROCEDURE — 82962 GLUCOSE BLOOD TEST: CPT

## 2025-04-29 PROCEDURE — 94761 N-INVAS EAR/PLS OXIMETRY MLT: CPT

## 2025-04-29 PROCEDURE — 2500000003 HC RX 250 WO HCPCS: Performed by: INTERNAL MEDICINE

## 2025-04-29 PROCEDURE — 99232 SBSQ HOSP IP/OBS MODERATE 35: CPT | Performed by: INTERNAL MEDICINE

## 2025-04-29 RX ADMIN — GUAIFENESIN 1200 MG: 600 TABLET, EXTENDED RELEASE ORAL at 09:06

## 2025-04-29 RX ADMIN — THERA TABS 1 TABLET: TAB at 09:06

## 2025-04-29 RX ADMIN — BARIUM SULFATE 10 ML: 400 SUSPENSION ORAL at 13:26

## 2025-04-29 RX ADMIN — DILTIAZEM HYDROCHLORIDE 240 MG: 240 CAPSULE, EXTENDED RELEASE ORAL at 09:06

## 2025-04-29 RX ADMIN — METOPROLOL TARTRATE 25 MG: 25 TABLET, FILM COATED ORAL at 09:06

## 2025-04-29 RX ADMIN — SODIUM CHLORIDE, PRESERVATIVE FREE 10 ML: 5 INJECTION INTRAVENOUS at 09:06

## 2025-04-29 RX ADMIN — METOPROLOL TARTRATE 25 MG: 25 TABLET, FILM COATED ORAL at 20:46

## 2025-04-29 RX ADMIN — SODIUM CHLORIDE, PRESERVATIVE FREE 10 ML: 5 INJECTION INTRAVENOUS at 20:46

## 2025-04-29 RX ADMIN — APIXABAN 2.5 MG: 2.5 TABLET, FILM COATED ORAL at 20:46

## 2025-04-29 RX ADMIN — SERTRALINE HYDROCHLORIDE 25 MG: 25 TABLET ORAL at 20:45

## 2025-04-29 RX ADMIN — GUAIFENESIN 1200 MG: 600 TABLET, EXTENDED RELEASE ORAL at 20:45

## 2025-04-29 RX ADMIN — APIXABAN 2.5 MG: 2.5 TABLET, FILM COATED ORAL at 09:06

## 2025-04-29 ASSESSMENT — PAIN SCALES - GENERAL
PAINLEVEL_OUTOF10: 0

## 2025-04-29 NOTE — SIGNIFICANT EVENT
[facility]  Rapid/Medical Response Team Note      Patient:    Schuyler Philippe 79 y.o. male, : 1945  Patient MRN: [id number]    Admission Date: 4/15/2025   Admission Diagnosis: Respiratory failure (HCC) [J96.90]  Pleural effusion on right [J90]      RAPID RESPONSE  Rapid response called for: AMS    OBJECTIVE    MD by [facility] floor, near barium swallow room.    Patient responsive to pain, not to voice.    Pupils - ERRL  120/80 and 80 pulse  SpO2 > 95    Tech says patient was responding appropriately and following commands during the procedure.  He had spit out the swallow contrast.  Aspiration did not occur during the procedure.  When they moved him from the chair to the bed, the patient became unresponsive.    While determining possible interventions to fix change in mental status, the patient's chart was reviewed.  Progress note for today says that the patient is hospice and comfort care measures only.    Dr. Upton was called and confirms that to be true, and will place the appropriate orders.      RRT/MRT start time: 1331              RRT/MRT end time: 1344  Vitals:    25 1100   BP:    Pulse: 79   Resp:    Temp:    SpO2:         Physical Exam:  Gen: unresponsive, mouth open  HEENT: normocephalic, atraumatic, MMM, no thyromegaly, no JVD  CV: RRR, S1/S2 present without M/R/G, +2 radial pulses  Pulm: CTAB, no wheezes, no crackles  Abd: S/NT/ND, no rebound, no guarding, no hepatosplenomegaly   MSK: no clubbing, no edema  Skin: warm, dry, intact, no rash  Neuro: CN II-XII grossly intact, no focal deficits appreciated   Psych: A&Ox0      ASSESSMENT/PLAN AND DISPOSITION  Schuyler Philippe is a 79 y.o. year old male admitted for Respiratory failure (HCC) [J96.90]  Pleural effusion on right [J90]  Rapid Response Team/ Medical Response Team Called For: AMS      Disposition: 2s   Patient condition: poor      Primary team resuming care.     [facility] Senior resident Dr. Cleveland present during Rapid

## 2025-04-29 NOTE — CARE COORDINATION
[facility]  4/29/2025, 1:43 PM    Patient Name: Schuyler Philippe                   YOB: 1945    Patient offered star rated [place] [facility] for hospice.     DIONICIO spoke with Siddhartha (ISMAEL) pt brother at bedside regarding hospice agency.    [facility] is the facility chosen.      Norm Barakat BSW  Case Management

## 2025-04-29 NOTE — CARE COORDINATION
Clinicals sent via [employer] to [facility] to [email or web address], for them review for hospice.    Mary DELACRUZN RN  Case Management  [phone]

## 2025-04-29 NOTE — CARE COORDINATION
Shaylee liaison with [facility] confirmed the pt can to be admitted to the facility for hospice.    Mary Medina BSN RN  Case Management  [phone]

## 2025-04-29 NOTE — PROGRESS NOTES
[facility] Hospitalist Group  Progress Note    Patient: Schuyler Philippe Age: 79 y.o. : 1945 MR#: [id number] SSN: [id number]  Date/Time: 2025     C/C: Shortness of breath         HPI : 79-year-old male past medical history of colon cancer s/p colectomy and colostomy, mets to liver presented to [facility] with increasing shortness of breath, this was his second visit there, during first visit he was diagnosed with pneumonia and possible parapneumonic effusion discharged on antibiotics came back with increasing shortness of breath was transferred to [facility] for increasing pleural effusion.  1.2 L of yellow-colored fluid was extracted during pleural tap on 2025 cytology was negative cultures were negative still possible fluid collection secondary to malignancy.  Patient also had atrial fibrillation with rapid ventricular rate which resolved with Cardizem and currently on apixaban.  Overall patient's condition is very poor and deteriorating fast.  Palliative care consultation is done patient currently is DNR/DNI may go for hospice care          Subjective:  Patient is sleepy  Does not appear to be in any distress of pain or discomfort           Review of Systems:  Proper ROS cannot be obtained  Assessment/Plan:     1.  Acute hypoxic respiratory failure requiring initially BiPAP    2 pleural effusion s/p drainage transudate    3 colon cancer with metastasis to liver    4 permanent atrial fibrillation with rapid ventricular rate    5 anemia of chronic illness    PLAN:  2025  - Patient is quite comfortable  - Continue current comfort meds  - Discussed with  regarding placement  -  [facility] or other places  - Currently there are no family members in the room with patient.  ADD:  - Tried to call patient's Brother and care taker , I wanted to update patient's condition since RRT called  provider] melatonin tablet 3 mg  3 mg Oral Nightly PRN    sodium chloride flush 0.9 % injection 5-40 mL  5-40 mL IntraVENous 2 times per day    sodium chloride flush 0.9 % injection 5-40 mL  5-40 mL IntraVENous PRN    0.9 % sodium chloride infusion   IntraVENous PRN    dilTIAZem (CARDIZEM CD) extended release capsule 240 mg  240 mg Oral Daily    insulin lispro (HUMALOG,ADMELOG) injection vial 0-4 Units  0-4 Units SubCUTAneous 4x Daily AC & HS    glucose chewable tablet 16 g  4 tablet Oral PRN    dextrose bolus 10% 125 mL  125 mL IntraVENous PRN    Or    dextrose bolus 10% 250 mL  250 mL IntraVENous PRN    glucagon injection 1 mg  1 mg SubCUTAneous PRN    dextrose 10 % infusion   IntraVENous Continuous PRN    ipratropium 0.5 mg-albuterol 2.5 mg (DUONEB) nebulizer solution 1 Dose  1 Dose Inhalation Q4H PRN       Labs:    Recent Labs     04/28/25  0710   WBC 12.8   HGB 13.2   HCT 43.0        Recent Labs     04/26/25  1315 04/28/25  0710   * 133*   K 3.9 3.7    101   CO2 21 24   BUN 36* 36*   ALT 95*  --          Time spent on direct patient care >35 mints     Complexity : High complex - due to multiple medical issues outlined above.     CODE Status : Full code    Case discussed with:   [] Family  [x]Nursing  []Case Management         Disclaimer: Sections of this note are dictated utilizing voice recognition software, which may have resulted in some phonetic based errors in grammar and contents. Even though attempts were made to correct all the mistakes, some may have been missed, and remained in the body of the document. If questions arise, please contact our department.    Signed By: Hernando Upton MD     April 29, 2025

## 2025-04-29 NOTE — PALLIATIVE CARE
Palliative Medicine    Palliative team made a follow up visit to patient. He is resting comfortably in bed with eyes closed. Respirations are shallow, non labored.  He did alert to gentle touch. Shook head \"yes\" when asked if he was \"OK\".   No visitors present.      Palliative team remains available to provide support to Mr Philippe and his family during this hospital stay.    CODE STATUS: DNR/DNI    Tia Sevilla RN  Palliative Medicine Inpatient RN  Palliative COPE Line: [phone]

## 2025-04-29 NOTE — PROGRESS NOTES
Pt's interactions have decreased once he went down to do his swallow test that he was unsuccessful with. . Pt is not eating, drinking talking as much. He is asking where he is? Why is he here? I informed hi. He was not able to remember his brother's name or if he had any visits today. (Pt had multiple visits from family, hospice, MD and case management)

## 2025-04-29 NOTE — PLAN OF CARE
Problem: Chronic Conditions and Co-morbidities  Goal: Patient's chronic conditions and co-morbidity symptoms are monitored and maintained or improved  Outcome: Progressing     Problem: Discharge Planning  Goal: Discharge to home or other facility with appropriate resources  Outcome: Progressing     Problem: Skin/Tissue Integrity  Goal: Skin integrity remains intact  Description: 1.  Monitor for areas of redness and/or skin breakdown2.  Assess vascular access sites hourly3.  Every 4-6 hours minimum:  Change oxygen saturation probe site4.  Every 4-6 hours:  If on nasal continuous positive airway pressure, respiratory therapy assess nares and determine need for appliance change or resting period  Outcome: Progressing     Problem: Safety - Adult  Goal: Free from fall injury  Outcome: Progressing     Problem: Skin/Tissue Integrity - Adult  Goal: Skin integrity remains intact  Description: 1.  Monitor for areas of redness and/or skin breakdown2.  Assess vascular access sites hourly3.  Every 4-6 hours minimum:  Change oxygen saturation probe site4.  Every 4-6 hours:  If on nasal continuous positive airway pressure, respiratory therapy assess nares and determine need for appliance change or resting period  Outcome: Progressing     Problem: Musculoskeletal - Adult  Goal: Return mobility to safest level of function  Outcome: Progressing     Problem: Metabolic/Fluid and Electrolytes - Adult  Goal: Electrolytes maintained within normal limits  4/29/2025 0313 by Melissa Baker, RN  Outcome: Progressing  4/28/2025 1356 by Nohelia Patino, RN  Outcome: Progressing  Note: Will monitor and manage fluid intake. Labs are being closely monitored and electrolytes are replaced as needed.   Goal: Hemodynamic stability and optimal renal function maintained  Outcome: Progressing  Goal: Glucose maintained within prescribed range  Outcome: Progressing     Problem: Nutrition Deficit:  Goal: Optimize nutritional status  Recent Flowsheet

## 2025-04-29 NOTE — PROGRESS NOTES
Rapid called on pt for altered mental status. RT responded. No respiratory intervention required. RT checked out with nurse supervisor.

## 2025-04-29 NOTE — PLAN OF CARE
Problem: SLP Adult - Impaired Swallowing  Goal: By Discharge: Advance to least restrictive diet without signs or symptoms of aspiration for planned discharge setting.  See evaluation for individualized goals.  Description:   Patient will:  1. Tolerate PO trials with 0 s/s overt distress in 4/5 trials- not met  2. Utilize compensatory swallow strategies/maneuvers (decrease bite/sip, size/rate, alt. liq/sol) with min cues in 4/5 trials- not met  3. Perform oral-motor/laryngeal exercises to increase oropharyngeal swallow function with min cues- n/a  4. Complete an objective swallow study (i.e., MBSS) to assess swallow integrity, r/o aspiration, and determine of safest LRD, min A as indicated/ordered by MD- met    Rec:     Comfort feeds: may benefit from full honey-thick liquids  Aspiration precautions  HOB >45 during po intake, remain >30 for 30-45 minutes after po   Small sips; liquids with slow feeding rate   Oral care TID  Meds in puree   MBS 4/29/25  Outcome: Completed   SPEECH PATHOLOGY MODIFIED BARIUM SWALLOW STUDY/DISCHARGE    Patient: Schuyler Philippe (79 y.o. male)  Date: 4/29/2025  Primary Diagnosis: Respiratory failure (HCC) [J96.90]  Pleural effusion on right [J90]       Precautions: Aspiration    ASSESSMENT :  Based on the objective data described below, the patient presents with mod-sev pharyngeal dysphagia. Pt with oral expulsion of all PO presentations during MBS; no aspiration/penetration oral contrast observed during fluoro. Throughout the progression of study, pt demonstrated worsened mental status and eventual unresponsiveness. RRT called at that time. Per chart review: he has been transitioned to comfort feeds. He may benefit from full honey-thick liquids, aspiration precautions, oral care TID, and meds crushed. No further skilled SLP services are indicated at this time. Please re-consult if needed.      PLAN :  Recommendations and Planned Interventions: No further skilled SLP services are

## 2025-04-29 NOTE — ACP (ADVANCE CARE PLANNING)
Advance Care Planning   Healthcare Decision Maker:    Primary Decision Maker: Siddhartha Philippe - Brother/Sister - [phone]     completed follow up visit /RRT with patient who is not responsive at this time with very shallow breathing. There is an advance directive on file for patient. He is a DNR and was just made comfort care/hospice.  No family present. Family to be called. Chaplains will continue to follow and will provide pastoral care on an as needed/requested basis    Spiritual Health History and Assessment/Progress Note  [facility]    Follow-up, Emotional distress,  ,      Name: Schuyler Philippe MRN: [id number]    Age: 79 y.o.     Sex: male   Language: English   Spiritism: Other   Pleural effusion on right     Date: 4/29/2025            Total Time Calculated: 3 min              Spiritual Assessment continued in [facility] 2S TELEMETRY        Referral/Consult From: Multi-disciplinary team   Encounter Overview/Reason: Follow-up  Service Provided For: Patient    Prabha, Belief, Meaning:   Patient unable to assess at this time  Family/Friends No family/friends present      Importance and Influence:  Patient unable to assess at this time  Family/Friends No family/friends present    Community:  Patient feels well-supported. Support system includes: Extended family  Family/Friends No family/friends present    Assessment and Plan of Care:     Patient Interventions include:   Family/Friends Interventions include:     Patient Plan of Care:   Family/Friends Plan of Care: No family/friends present    Electronically signed by Jerry Gaffney Jr., [facility] on 4/29/2025 at 2:16 PM

## 2025-04-29 NOTE — CARE COORDINATION
04/28/25 1510   Avoidable Days   Payor SNF Auth     Auth pending for [facility] with hospice, waiting for [facility].      Norm Barakat BSW  Case Management

## 2025-04-29 NOTE — CARE COORDINATION
DIONICIO called the [facility] [phone] ext [phone] to inquire status of SNF contract, spoke with Jennifer the contract is still under review.     DIONICIO Stahl Ext.is [phone] who handles hospice services.

## 2025-04-29 NOTE — CARE COORDINATION
Received a call back from Jennifer at the [facility], informed her the pt is now hospice, she requested the clinical be emailed to [email or web address].    Mary HITCHCOCK RN  Case Management  [phone]

## 2025-04-29 NOTE — CONSULTS
Room #: 211      JEANNINE: [id number]      Situation: Wound Care Consult    Background:    PMH:   Active Ambulatory Problems     Diagnosis Date Noted    Syncope and collapse 07/09/2023    Acute kidney injury 07/09/2023    Diabetes mellitus (HCC)     Hypertension     A-fib (HCC)     Anemia 09/22/2023    Acute blood loss anemia 09/22/2023    Malignant neoplasm of colon, unspecified (HCC) 04/08/2025    Bronchitis, not specified as acute or chronic 04/08/2025    Gastroesophageal reflux disease 04/08/2025    Ileostomy status (HCC) 04/08/2025    Encounter for therapeutic drug level monitoring 04/08/2025    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   Preventive measures in place: glide sheet, limited layers, pillows for turning, silicone to sacrum and bilateral heels    Assessment:   Patient found supine.  Patient is Awake and alert and Agitated. Initially yelling \"help\", but when entered the room his speech is garbled and difficult to understand.  He mentions something about water but refuses offers for beverages, he has several on his tray.  Noted to have mismatching ostomy supplies on at this time.  Large wafer with small pouch.  Currently no leakage.      Wound(s) Description:           Wound 04/29/25 Sacrum (Active)   Wound Image    04/29/25 0958   Wound Etiology Pressure Stage 2 04/29/25 0958   Dressing Status Reinforced dressing 04/29/25 0958   Dressing/Treatment Silicone border 04/29/25 0958   Wound Length (cm) 1.3 cm 04/29/25 0958   Wound Width (cm) 0.6 cm 04/29/25 0958   Wound Surface Area (cm^2) 0.78 cm^2 04/29/25 0958   Wound Assessment Pale granulation tissue 04/29/25 0958   Drainage

## 2025-04-29 NOTE — PROGRESS NOTES
Perfect served MD to see if he wanted to proceed with the Barium swallow. He stated to go ahead. I explained to him that the pt was not eating only drinking but not so much. Pt stated he wanted to be left alone and he is tired.     Pt was already gone downstairs Lizet called transport to get pt without checking in with the nurse. Once another nurse notified me of her signing for him to go to have the test I then try to stop the test until I check to see if the MD still wanted it.     The pt is on Hospice. That is why I wanted clarity on what order we were carrying out.

## 2025-04-29 NOTE — CARE COORDINATION
CM called and spoke with liaison Shaylee liaison for [facility] informed pt will be discharging on hospice and not skilled she stated she ill call the facility and call CM back to make sure they can still accommodate the pt.    Mary DELACRUZN RN  Case Management  [phone]

## 2025-04-29 NOTE — PROGRESS NOTES
Mimi was called on the pt for AMS. Pt was not a good candidate to move forward with barium swallow. Test was canceled.

## 2025-04-29 NOTE — CARE COORDINATION
04/29/25 1513   Avoidable Days   Payor SNF Auth     Auth pending for [facility] with hospice, waiting for [facility].    Norm Barakat BSW  Case Management

## 2025-04-30 LAB
GLUCOSE BLD STRIP.AUTO-MCNC: 103 MG/DL (ref 70–110)
GLUCOSE BLD STRIP.AUTO-MCNC: 108 MG/DL (ref 70–110)
GLUCOSE BLD STRIP.AUTO-MCNC: 112 MG/DL (ref 70–110)
GLUCOSE BLD STRIP.AUTO-MCNC: 150 MG/DL (ref 70–110)
GLUCOSE BLD STRIP.AUTO-MCNC: 94 MG/DL (ref 70–110)

## 2025-04-30 PROCEDURE — 6370000000 HC RX 637 (ALT 250 FOR IP): Performed by: STUDENT IN AN ORGANIZED HEALTH CARE EDUCATION/TRAINING PROGRAM

## 2025-04-30 PROCEDURE — 6370000000 HC RX 637 (ALT 250 FOR IP): Performed by: INTERNAL MEDICINE

## 2025-04-30 PROCEDURE — 1100000003 HC PRIVATE W/ TELEMETRY

## 2025-04-30 PROCEDURE — 94761 N-INVAS EAR/PLS OXIMETRY MLT: CPT

## 2025-04-30 PROCEDURE — 82962 GLUCOSE BLOOD TEST: CPT

## 2025-04-30 PROCEDURE — 2500000003 HC RX 250 WO HCPCS: Performed by: STUDENT IN AN ORGANIZED HEALTH CARE EDUCATION/TRAINING PROGRAM

## 2025-04-30 PROCEDURE — 99239 HOSP IP/OBS DSCHRG MGMT >30: CPT | Performed by: INTERNAL MEDICINE

## 2025-04-30 RX ORDER — SERTRALINE HYDROCHLORIDE 25 MG/1
25 TABLET, FILM COATED ORAL NIGHTLY
Qty: 30 TABLET | Refills: 3 | Status: SHIPPED | OUTPATIENT
Start: 2025-04-30

## 2025-04-30 RX ORDER — NALOXONE HYDROCHLORIDE 0.4 MG/ML
0.4 INJECTION, SOLUTION INTRAMUSCULAR; INTRAVENOUS; SUBCUTANEOUS PRN
Qty: 1 EACH | Refills: 0 | Status: SHIPPED | OUTPATIENT
Start: 2025-04-30

## 2025-04-30 RX ORDER — ONDANSETRON 4 MG/1
4 TABLET, ORALLY DISINTEGRATING ORAL EVERY 8 HOURS PRN
Qty: 10 TABLET | Refills: 0 | Status: SHIPPED | OUTPATIENT
Start: 2025-04-30

## 2025-04-30 RX ORDER — POLYETHYLENE GLYCOL 3350 17 G/17G
17 POWDER, FOR SOLUTION ORAL DAILY PRN
Qty: 30 PACKET | Refills: 0 | Status: SHIPPED | OUTPATIENT
Start: 2025-04-30 | End: 2025-05-30

## 2025-04-30 RX ADMIN — APIXABAN 2.5 MG: 2.5 TABLET, FILM COATED ORAL at 21:45

## 2025-04-30 RX ADMIN — APIXABAN 2.5 MG: 2.5 TABLET, FILM COATED ORAL at 09:40

## 2025-04-30 RX ADMIN — OXYCODONE HYDROCHLORIDE 5 MG: 5 TABLET ORAL at 17:25

## 2025-04-30 RX ADMIN — SODIUM CHLORIDE, PRESERVATIVE FREE 10 ML: 5 INJECTION INTRAVENOUS at 21:47

## 2025-04-30 RX ADMIN — DILTIAZEM HYDROCHLORIDE 240 MG: 240 CAPSULE, EXTENDED RELEASE ORAL at 09:40

## 2025-04-30 RX ADMIN — SERTRALINE HYDROCHLORIDE 25 MG: 25 TABLET ORAL at 21:45

## 2025-04-30 RX ADMIN — GUAIFENESIN 1200 MG: 600 TABLET, EXTENDED RELEASE ORAL at 21:45

## 2025-04-30 RX ADMIN — METOPROLOL TARTRATE 25 MG: 25 TABLET, FILM COATED ORAL at 21:45

## 2025-04-30 RX ADMIN — METOPROLOL TARTRATE 25 MG: 25 TABLET, FILM COATED ORAL at 09:41

## 2025-04-30 RX ADMIN — SODIUM CHLORIDE, PRESERVATIVE FREE 10 ML: 5 INJECTION INTRAVENOUS at 09:41

## 2025-04-30 RX ADMIN — GUAIFENESIN 1200 MG: 600 TABLET, EXTENDED RELEASE ORAL at 09:40

## 2025-04-30 RX ADMIN — THERA TABS 1 TABLET: TAB at 09:40

## 2025-04-30 ASSESSMENT — PAIN SCALES - GENERAL
PAINLEVEL_OUTOF10: 7
PAINLEVEL_OUTOF10: 0
PAINLEVEL_OUTOF10: 1

## 2025-04-30 ASSESSMENT — PAIN - FUNCTIONAL ASSESSMENT: PAIN_FUNCTIONAL_ASSESSMENT: ACTIVITIES ARE NOT PREVENTED

## 2025-04-30 ASSESSMENT — PAIN DESCRIPTION - DESCRIPTORS: DESCRIPTORS: ACHING;DISCOMFORT

## 2025-04-30 ASSESSMENT — PAIN DESCRIPTION - LOCATION: LOCATION: GENERALIZED

## 2025-04-30 NOTE — PALLIATIVE CARE
Palliative Medicine   Palliative Medicine team members, Tabby Dyer NP and  Tricia Tubbs RN rounded on comfort care patient, Mr. Schuyler Philippe room 211.  Mr. Philippe was lying in bed resting without outward signs of discomfort. Breathing is unlabored. No Family are present at bedside.  Patient alerted to his name and stated he was wanted a drink. Using a spoon, palliative RN provided patient with thickened water with ice chips.  Patient expressed gratitude and took about 4 to 5 spoonfuls. Palliative Team will continue to follow for comfort assessment.  Plan is discharged to nursing facility with hospice support when arranged.     CODE STATUS- DNR/DNI    DDNR on file   Comfort Measures Only     Tricia Tubbs BSN, RN  Palliative Medicine Inpatient RN  Palliative COPE Line: [phone]

## 2025-04-30 NOTE — CARE COORDINATION
CM called the [facility] [phone] ext [phone] , left voicemail fro Ms. Stahl to return call.    Mary Medina BSN RN  Case Management  [phone]

## 2025-04-30 NOTE — CARE COORDINATION
CM called the [facility] [phone] ext [phone]  spoke with Ms. Stahl, she requested the clinicals be [facility] [phone].   Clinicals faxed.    Mary HITCHCOCK RN  Case Management  [phone]

## 2025-04-30 NOTE — PROGRESS NOTES
Per MD note yesterday, pt now comfort care and is pending placement for hospice. Will sign off from OT at this time.       Thank you,   Angelique Kaplan MS, OTR/L

## 2025-04-30 NOTE — PROGRESS NOTES
Patient is pending placement for hospice. Will sign off from PT at this time. Thank you.   Kamala Garcia PT, DPT

## 2025-04-30 NOTE — PROGRESS NOTES
4 Eyes Skin Assessment     NAME:  Schuyler Philippe  YOB: 1945  MEDICAL RECORD NUMBER:  [id number]    The patient is being assessed for  Shift Handoff    I agree that at least one RN has performed a thorough Head to Toe Skin Assessment on the patient. ALL assessment sites listed below have been assessed.      Areas assessed by both nurses:    Head, Face, Ears, Shoulders, Back, Chest, Arms, Elbows, Hands, Sacrum. Buttock, Coccyx, Ischium, Legs. Feet and Heels, and Under Medical Devices         Does the Patient have a Wound? Yes wound(s) were present on assessment. LDA wound assessment was Initiated and completed by RN       Nate Prevention initiated by RN: Yes  Wound Care Orders initiated by RN: Yes    Pressure Injury (Stage 3,4, Unstageable, DTI, NWPT, and Complex wounds) if present, place Wound referral order by RN under : No    New Ostomies, if present place, Ostomy referral order under : Yes     Nurse 1 eSignature: Electronically signed by Gloria Clarke RN on 4/30/25 at 6:55 AM EDT    **SHARE this note so that the co-signing nurse can place an eSignature**    Nurse 2 eSignature: {Esignature:[id number]}

## 2025-04-30 NOTE — CARE COORDINATION
04/30/25 1543   Avoidable Days   Payor SNF Auth     [facility]ing for [facility] for [facility]F with hospice.      Norm Barakat BSW  Case Management

## 2025-04-30 NOTE — CARE COORDINATION
DIONICIO received  a call from Renae Valles from [facility] regarding pt and discharge plan.     DIONICIO asked Renae to check pt AUTH for status update.      Renae informed DIONICIO that she will call [facility] for an update.     Renae stated she will call DIONICIO back with updates.       Norm DELACRUZW  Case Management

## 2025-04-30 NOTE — PROGRESS NOTES
NUTRITION FOLLOW UP NOTE    Pt admitted for pneumonia, pleural effusion. Pt s/p thoracentesis 4/16- 1.2 L removed. Palliative care following, last note 4/29- goals of care DNR/DNI. SLP following, last eval 4/29- comfort feeds, rec'd full honey-thick liquids. Note comfort measures currently ordered. Nutrition interventions no longer appropriate per goals of care.    Current Nutrition Therapies:  Average Meal Intake: 0%, 1-25%  Average Supplements Intake: Unable to assess  Diet: ADULT DIET; Full Liquid; Moderately Thick (Honey)  ADULT ORAL NUTRITION SUPPLEMENT; Lunch, Dinner; Frozen Oral Supplement       Nutrition Related Findings:   Pertinent Meds:   MVI Pertinent Labs:  Recent Labs     04/28/25  0710   GLUCOSE 121*   BUN 36*   CREATININE 1.24   *   K 3.7      CO2 24   CALCIUM 9.0     Recent Labs     04/28/25  1150 04/28/25  1644 04/28/25  2019 04/29/25  0728 04/29/25  1358 04/29/25  1642 04/29/25  1944 04/30/25  0604   POCGLU 124* 125* 142* 118* 130* 128* 117* 103       Last BM: 04/30/25    Skin: Wound Type: Pressure Injury, Stage II    Edema:    Right lower extremity, Left lower extremity   Edema Generalized: +1     RLE Edema: +1  LLE Edema: +1     Estimated Nutrition Needs: 92 kg   Energy Requirements Based On: Formula  Weight Used for Energy Requirements: Current  Energy (kcal/day): 1906-8078 (MSJ x 1-1.2)  Weight Used for Protein Requirements: Current  Protein (g/day):  (1-1.2)  Method Used for Fluid Requirements: 1 ml/kcal  Fluid (ml/day): 8671-3429    Nutrition Recommendations/Plan:   Continue current diet and supplements as tolerated/desired by pt; diet for comfort.   Nutrition interventions no longer appropriate per goals of care.   Continue to monitor plan of care during admission.       Esperanza Taylor RD  Contact: [phone]  Available via [employer]

## 2025-04-30 NOTE — PLAN OF CARE
Signed.    Problem: Chronic Conditions and Co-morbidities  Goal: Patient's chronic conditions and co-morbidity symptoms are monitored and maintained or improved  Outcome: Progressing  Flowsheets (Taken 4/29/2025 2328)  Care Plan - Patient's Chronic Conditions and Co-Morbidity Symptoms are Monitored and Maintained or Improved:   Monitor and assess patient's chronic conditions and comorbid symptoms for stability, deterioration, or improvement   Collaborate with multidisciplinary team to address chronic and comorbid conditions and prevent exacerbation or deterioration   Update acute care plan with appropriate goals if chronic or comorbid symptoms are exacerbated and prevent overall improvement and discharge     Problem: Skin/Tissue Integrity  Goal: Skin integrity remains intact  Description: 1.  Monitor for areas of redness and/or skin breakdown2.  Assess vascular access sites hourly3.  Every 4-6 hours minimum:  Change oxygen saturation probe site4.  Every 4-6 hours:  If on nasal continuous positive airway pressure, respiratory therapy assess nares and determine need for appliance change or resting period  Outcome: Progressing  Flowsheets (Taken 4/29/2025 2328)  Skin Integrity Remains Intact:   Monitor for areas of redness and/or skin breakdown   Assess vascular access sites hourly   Turn and reposition as indicated   Positioning devices   Monitor skin under medical devices   Check visual cues for pain     Problem: Safety - Adult  Goal: Free from fall injury  Outcome: Progressing  Flowsheets (Taken 4/29/2025 2328)  Free From Fall Injury:   Based on caregiver fall risk screen, instruct family/caregiver to ask for assistance with transferring infant if caregiver noted to have fall risk factors   Instruct family/caregiver on patient safety     Problem: SLP Adult - Impaired Swallowing  Goal: By Discharge: Advance to least restrictive diet without signs or symptoms of aspiration for planned discharge setting.  See

## 2025-04-30 NOTE — CARE COORDINATION
Cm called [facility] [phone] [facility] ex [phone] spoke with Fabi for follow up on Hospice authorization, cm was informed that clinicals was never received and to refax clinicals to [phone], clinical faxed and confirmation received. Auth remains pending at this time.

## 2025-05-01 LAB
GLUCOSE BLD STRIP.AUTO-MCNC: 125 MG/DL (ref 70–110)
GLUCOSE BLD STRIP.AUTO-MCNC: 130 MG/DL (ref 70–110)
GLUCOSE BLD STRIP.AUTO-MCNC: 130 MG/DL (ref 70–110)
GLUCOSE BLD STRIP.AUTO-MCNC: 136 MG/DL (ref 70–110)

## 2025-05-01 PROCEDURE — 1100000003 HC PRIVATE W/ TELEMETRY

## 2025-05-01 PROCEDURE — 6370000000 HC RX 637 (ALT 250 FOR IP): Performed by: STUDENT IN AN ORGANIZED HEALTH CARE EDUCATION/TRAINING PROGRAM

## 2025-05-01 PROCEDURE — 2500000003 HC RX 250 WO HCPCS: Performed by: STUDENT IN AN ORGANIZED HEALTH CARE EDUCATION/TRAINING PROGRAM

## 2025-05-01 PROCEDURE — 6370000000 HC RX 637 (ALT 250 FOR IP): Performed by: INTERNAL MEDICINE

## 2025-05-01 PROCEDURE — 94761 N-INVAS EAR/PLS OXIMETRY MLT: CPT

## 2025-05-01 PROCEDURE — 82962 GLUCOSE BLOOD TEST: CPT

## 2025-05-01 PROCEDURE — 99232 SBSQ HOSP IP/OBS MODERATE 35: CPT | Performed by: HOSPITALIST

## 2025-05-01 RX ADMIN — SERTRALINE HYDROCHLORIDE 25 MG: 25 TABLET ORAL at 23:36

## 2025-05-01 RX ADMIN — THERA TABS 1 TABLET: TAB at 10:41

## 2025-05-01 RX ADMIN — METOPROLOL TARTRATE 25 MG: 25 TABLET, FILM COATED ORAL at 10:43

## 2025-05-01 RX ADMIN — APIXABAN 2.5 MG: 2.5 TABLET, FILM COATED ORAL at 10:42

## 2025-05-01 RX ADMIN — GUAIFENESIN 1200 MG: 600 TABLET, EXTENDED RELEASE ORAL at 10:42

## 2025-05-01 RX ADMIN — SODIUM CHLORIDE, PRESERVATIVE FREE 10 ML: 5 INJECTION INTRAVENOUS at 23:37

## 2025-05-01 RX ADMIN — METOPROLOL TARTRATE 25 MG: 25 TABLET, FILM COATED ORAL at 23:37

## 2025-05-01 RX ADMIN — OXYCODONE HYDROCHLORIDE 5 MG: 5 TABLET ORAL at 05:48

## 2025-05-01 RX ADMIN — APIXABAN 2.5 MG: 2.5 TABLET, FILM COATED ORAL at 23:37

## 2025-05-01 ASSESSMENT — PAIN DESCRIPTION - ORIENTATION: ORIENTATION: MID

## 2025-05-01 ASSESSMENT — PAIN SCALES - GENERAL
PAINLEVEL_OUTOF10: 0
PAINLEVEL_OUTOF10: 4
PAINLEVEL_OUTOF10: 0

## 2025-05-01 ASSESSMENT — PAIN DESCRIPTION - LOCATION: LOCATION: BUTTOCKS;GENERALIZED

## 2025-05-01 ASSESSMENT — PAIN DESCRIPTION - DESCRIPTORS: DESCRIPTORS: ACHING

## 2025-05-01 ASSESSMENT — PAIN - FUNCTIONAL ASSESSMENT: PAIN_FUNCTIONAL_ASSESSMENT: PREVENTS OR INTERFERES SOME ACTIVE ACTIVITIES AND ADLS

## 2025-05-01 NOTE — PLAN OF CARE
Problem: Chronic Conditions and Co-morbidities  Goal: Patient's chronic conditions and co-morbidity symptoms are monitored and maintained or improved  Outcome: Progressing  Flowsheets (Taken 5/1/2025 0227 by Gloria Clarke, RN)  Care Plan - Patient's Chronic Conditions and Co-Morbidity Symptoms are Monitored and Maintained or Improved:   Monitor and assess patient's chronic conditions and comorbid symptoms for stability, deterioration, or improvement   Collaborate with multidisciplinary team to address chronic and comorbid conditions and prevent exacerbation or deterioration   Update acute care plan with appropriate goals if chronic or comorbid symptoms are exacerbated and prevent overall improvement and discharge     Problem: Discharge Planning  Goal: Discharge to home or other facility with appropriate resources  Outcome: Progressing  Flowsheets (Taken 5/1/2025 0227 by Gloria Clarke, RN)  Discharge to home or other facility with appropriate resources:   Identify barriers to discharge with patient and caregiver   Arrange for needed discharge resources and transportation as appropriate   Identify discharge learning needs (meds, wound care, etc)   Arrange for interpreters to assist at discharge as needed     Problem: Skin/Tissue Integrity  Goal: Skin integrity remains intact  Description: 1.  Monitor for areas of redness and/or skin breakdown2.  Assess vascular access sites hourly3.  Every 4-6 hours minimum:  Change oxygen saturation probe site4.  Every 4-6 hours:  If on nasal continuous positive airway pressure, respiratory therapy assess nares and determine need for appliance change or resting period  Outcome: Progressing  Flowsheets (Taken 5/1/2025 0227 by Gloria Clarke, RN)  Skin Integrity Remains Intact:   Monitor for areas of redness and/or skin breakdown   Monitor skin under medical devices   Turn and reposition as indicated     Problem: Safety - Adult  Goal: Free from fall

## 2025-05-01 NOTE — PALLIATIVE CARE
Palliative Medicine    Palliative team rounded on comfort patient. Patient is resting comfortably in bed, eyes closed. Alerted to name. Respirations are easy and non labored. Patient shook his head \"no\" to pain, hunger and thirst. No visitors at the bedside.      Palliative team remains available to provide support to Mr Philippe and his family during this hospital stay.    CODE STATUS: DNR/DNI, COMFORT MEASURES      Tia Sevilla RN  Palliative Medicine Inpatient RN  Palliative COPE Line: [phone]

## 2025-05-01 NOTE — CARE COORDINATION
Awaiting auth [facility] the [facility] for hospice at [facility].    Leslei Allan BSW   Case Management

## 2025-05-01 NOTE — PROGRESS NOTES
4 Eyes Skin Assessment     NAME:  Schuyler Philippe  YOB: 1945  MEDICAL RECORD NUMBER:  [id number]    The patient is being assessed for  Shift Handoff    I agree that at least one RN has performed a thorough Head to Toe Skin Assessment on the patient. ALL assessment sites listed below have been assessed.      Areas assessed by both nurses:    Head, Face, Ears, Shoulders, Back, Chest, Arms, Elbows, Hands, Sacrum. Buttock, Coccyx, Ischium, Legs. Feet and Heels, and Under Medical Devices         Does the Patient have a Wound? Yes wound(s) were present on assessment. LDA wound assessment was Initiated and completed by RN       Nate Prevention initiated by RN: Yes  Wound Care Orders initiated by RN: Yes    Pressure Injury (Stage 3,4, Unstageable, DTI, NWPT, and Complex wounds) if present, place Wound referral order by RN under : No    New Ostomies, if present place, Ostomy referral order under : Yes     Nurse 1 eSignature: Electronically signed by Gloria Clarke RN on 5/1/25 at 8:02 AM EDT    **SHARE this note so that the co-signing nurse can place an eSignature**    Nurse 2 eSignature: {Esignature:[id number]}

## 2025-05-01 NOTE — CARE COORDINATION
Patient awaiting auth [facility] the [facility] for [facility] for Hospice.    Leslie Allan BSW   Case Management

## 2025-05-01 NOTE — PROGRESS NOTES
[facility]ist [facility]  Progress Note    Patient: Schuyler Philippe Age: 79 y.o. : 1945 MR#: [id number] SSN: [id number]  Date/Time: 2025     Subjective: Patient lying in the bed, wakes up easily but does not communicate much.  Looks comfortable, no acute distress     Assessment/Plan:   1.  Acute hypoxic respiratory failure requiring initially BiPAP  2. Pleural effusion s/p drainage transudate  3. Colon cancer with metastasis to liver  4. Permanent atrial fibrillation with rapid ventricular rate  5. Anemia of chronic illness  6.  Mild hypothermia today    Plan  Will continue current medications p.o. as tolerated  Will place warm blankets and monitor  No need for aggressive workup as patient is on comfort measures  Case management working on placement with hospice  Discussed with RN    Discussed with patient and also with brother Siddhartha over the phone and explained about my above plan care.  Discussed with him about possible transfer to hospital facility tomorrow.  Also discussed about if worsening condition overnight, no escalation of care, continue comfort measures, comfort feeds as tolerated during the hospital stay.      Dispo plan: Facility with hospice once the [facility] approves        Case discussed with:  [x]Patient  [x]Family  []Nursing  []Case Management  DVT Prophylaxis:  []Lovenox  []Hep SQ  []SCDs  []Coumadin   []Eliquis/Xarelto     Objective:   VS: /77   Pulse 96   Temp (!) 94.8 °F (34.9 °C) Comment: after applying several warm blankets on top of pt  Resp 18   Ht 1.7 m (5' 6.93\")   Wt 91.5 kg (201 lb 11.2 oz)   SpO2 94%   BMI 31.66 kg/m²    Tmax/24hrs: Temp (24hrs), Av.6 °F (35.9 °C), Min:94.8 °F (34.9 °C), Max:97.3 °F (36.3 °C)  IOBRIEF  Intake/Output Summary (Last 24 hours) at 2025 9402  Last data filed at 2025 0555  Gross per 24 hour   Intake --   Output 200 ml   Net -200 ml       General:  Alert, cooperative, no acute distress    Looks

## 2025-05-01 NOTE — PROGRESS NOTES
pt's rectal temp 94.9. applied warm blanket on pt    Perfect served Dr. Wilson message     9310 MD is aware    5162 perfect served message to Dr. Wilson palliative care note from today shows Code status DNR/DNI, comfort measures. Please change code status to comfort meassures

## 2025-05-01 NOTE — CARE COORDINATION
Received a call from Ms. Rodriguez from the [facility], she stated the auth should be approved for today, and that we should call the facility and check if the facility has auth prior to discharging pt.    Called and spoke with Shaylee Benjamin requested a call back once they receive auth.    Mary Medina BSN RN  Case Management  [phone]

## 2025-05-02 VITALS
BODY MASS INDEX: 31.66 KG/M2 | RESPIRATION RATE: 18 BRPM | TEMPERATURE: 95.6 F | OXYGEN SATURATION: 94 % | SYSTOLIC BLOOD PRESSURE: 107 MMHG | HEART RATE: 100 BPM | DIASTOLIC BLOOD PRESSURE: 64 MMHG | HEIGHT: 67 IN | WEIGHT: 201.7 LBS

## 2025-05-02 PROCEDURE — 6370000000 HC RX 637 (ALT 250 FOR IP): Performed by: NURSE PRACTITIONER

## 2025-05-02 PROCEDURE — 2500000003 HC RX 250 WO HCPCS: Performed by: STUDENT IN AN ORGANIZED HEALTH CARE EDUCATION/TRAINING PROGRAM

## 2025-05-02 PROCEDURE — 6370000000 HC RX 637 (ALT 250 FOR IP): Performed by: INTERNAL MEDICINE

## 2025-05-02 PROCEDURE — 94761 N-INVAS EAR/PLS OXIMETRY MLT: CPT

## 2025-05-02 PROCEDURE — 99232 SBSQ HOSP IP/OBS MODERATE 35: CPT | Performed by: NURSE PRACTITIONER

## 2025-05-02 PROCEDURE — 99239 HOSP IP/OBS DSCHRG MGMT >30: CPT | Performed by: HOSPITALIST

## 2025-05-02 PROCEDURE — 6370000000 HC RX 637 (ALT 250 FOR IP): Performed by: STUDENT IN AN ORGANIZED HEALTH CARE EDUCATION/TRAINING PROGRAM

## 2025-05-02 RX ORDER — LORAZEPAM 2 MG/ML
0.5 CONCENTRATE ORAL EVERY 4 HOURS PRN
Status: DISCONTINUED | OUTPATIENT
Start: 2025-05-02 | End: 2025-05-02 | Stop reason: HOSPADM

## 2025-05-02 RX ORDER — ATROPINE SULFATE 10 MG/ML
2 SOLUTION/ DROPS OPHTHALMIC EVERY 4 HOURS PRN
Status: DISCONTINUED | OUTPATIENT
Start: 2025-05-02 | End: 2025-05-02 | Stop reason: HOSPADM

## 2025-05-02 RX ORDER — MORPHINE SULFATE 20 MG/ML
5 SOLUTION ORAL
Refills: 0 | Status: DISCONTINUED | OUTPATIENT
Start: 2025-05-02 | End: 2025-05-02 | Stop reason: HOSPADM

## 2025-05-02 RX ORDER — ATROPINE SULFATE 10 MG/ML
2 SOLUTION/ DROPS OPHTHALMIC EVERY 4 HOURS PRN
Status: SHIPPED | DISCHARGE
Start: 2025-05-02

## 2025-05-02 RX ADMIN — MORPHINE SULFATE 5 MG: 100 SOLUTION ORAL at 13:47

## 2025-05-02 RX ADMIN — METOPROLOL TARTRATE 25 MG: 25 TABLET, FILM COATED ORAL at 11:15

## 2025-05-02 RX ADMIN — APIXABAN 2.5 MG: 2.5 TABLET, FILM COATED ORAL at 11:14

## 2025-05-02 RX ADMIN — THERA TABS 1 TABLET: TAB at 11:15

## 2025-05-02 RX ADMIN — SODIUM CHLORIDE, PRESERVATIVE FREE 10 ML: 5 INJECTION INTRAVENOUS at 11:16

## 2025-05-02 ASSESSMENT — PAIN SCALES - GENERAL
PAINLEVEL_OUTOF10: 0

## 2025-05-02 NOTE — PROGRESS NOTES
Palliative Medicine  Patient Name: Schuyler Philippe  YOB: 1945  MRN: [id number]  Age: 79 y.o.  Gender: male    Date of Initial Consult: 4/16/2025   Reconsulted 4/25/2025   Date of Service: 5/2/2025  Time: 1:56 PM  Provider: ROMULO Ashley NP  Hospital Day: 18  Admit Date: 4/15/2025  Referring Provider: Dr Burns        Reasons for Consultation:  Goals of Care    HISTORY OF PRESENT ILLNESS (HPI):   Schuyler Philippe is a 79 y.o. male with a past medical history of hypertension, atrial fin on Eliquis, colon cancer with mets to the liver and lung , who was admitted on 4/15/2025 from [facility]  with a diagnosis of Pleural effusion . Patient initially presented to [facility] with shortness of breath. He was found to have a right pleural effusion and transferred to [facility] for a dell level of care. Palliative medicine is consulted for goals of care.     5/2/2025 drowsy this am but will alert to his name. Added Morphine SL for pain and / or shortness of breath appears slightly uncomfortable.     4/28/2025 Patient is very fatigued sleepy but alerts. Calm this am. Met with his brother Siddhartha and patient. Hospice consult placed.     4/25/2025 Reconsulted on Mr Philippe due to health decline, increased WBC. Today very weak appearing, more frail appearing. Sleepy not engaging with us  as he has previously been. Family including brother at bedside.     4/16/2025 Patient is alert and oriented x 3. Currently denies pain and shortness of breath. He is on nasal cannula oxygen. Anxious for thoracentesis.          PALLIATIVE DIAGNOSES:    Encounter for palliative care / advanced care plan discussions  Goals of care   Pleural effusion   Metastatic cancer of the colon   Anemia of chronic disease     ASSESSMENT AND PLAN:   Encounter for palliative care / advanced care plan discussions    May 2, 2025 Mr Phiilppe seen at bedside. Drowsy but will alert to his name. Brother Chino at bedside. Schuyler looks a  bit uncomfortable but did not answer if he was in pain. He stated \" just let me go.\" Patient appears very terminal. Will add Roxanol ( listed allergy however listed as dizziness) likely side effect vs true allergy. Benefit out weighs risk in this scenario. Will also add Ativan for anxiety / terminal agitation. Noted plans for d/c later today to facility with hospice support.Goals of care DNR/DNI comfort measures / hospice at d/c.     Please see below for previous notes per palliative team     April 28, 2025 Patient seen along with Ms Di RN. Mr Philippe lying in bed. He appears very weak and fatigued this am. He would alert to his name and answer a few questions for us. He told our team he wanted to be comfortable and focus on his symptoms and was Ok going to a facility that can help care for him and focus on his symptoms. We spoke with his brother Siddhartha who is also his MPOA. He was very pleased Schuyler made this decision and supports it whole heartily. He understands this aligns with hospice support at facility. Hospice consult was placed. CM aware as well as attending. Patient lives alone and can no longer care for himself. No family able to care for him. Goals of care DNR/DNI, continue medical interventions hospice at d/c ( consult placed)     Please see below for previous notes per palliative team     April 25, 2025 Re consulted today in patient with increased weakness, frailness. Appears to be declining despite medical treatment. Appears thinner. Brother Siddhartha at bedside. Appreciate attending conversations with patient and brother. Hospice broached. Brother feels Mr Philippe is worse and this would be best course of action for his comfort. However difficult for us to assess if Schuyler understands. He did not readily engage kept eyes closed. Brother asked to speak outside room. He understands his health decline and wishes to see his brother comfortable. Siddhartha does not believe Schuyler completely understands his

## 2025-05-02 NOTE — DISCHARGE INSTRUCTIONS
DISCHARGE SUMMARY from Nurse    PATIENT INSTRUCTIONS:    After general anesthesia or intravenous sedation, for 24 hours or while taking prescription Narcotics:  Limit your activities  Do not drive and operate hazardous machinery  Do not make important personal or business decisions  Do  not drink alcoholic beverages  If you have not urinated within 8 hours after discharge, please contact your surgeon on call.    Report the following to your surgeon:  Excessive pain, swelling, redness or odor of or around the surgical area  Temperature over 100.5  Nausea and vomiting lasting longer than 4 hours or if unable to take medications  Any signs of decreased circulation or nerve impairment to extremity: change in color, persistent  numbness, tingling, coldness or increase pain  Any questions    What to do at Home:  Recommended activity: activity as tolerated,     If you experience any of the following symptoms shortness of breath, please follow up with primary care or go to nearest emergency room.    *  Please give a list of your current medications to your Primary Care Provider.    *  Please update this list whenever your medications are discontinued, doses are      changed, or new medications (including over-the-counter products) are added.    *  Please carry medication information at all times in case of emergency situations.    These are general instructions for a healthy lifestyle:    No smoking/ No tobacco products/ Avoid exposure to second hand smoke  Surgeon General's Warning:  Quitting smoking now greatly reduces serious risk to your health.    Obesity, smoking, and sedentary lifestyle greatly increases your risk for illness    A healthy diet, regular physical exercise & weight monitoring are important for maintaining a healthy lifestyle    You may be retaining fluid if you have a history of heart failure or if you experience any of the following symptoms:  Weight gain of 3 pounds or more overnight or 5 pounds in

## 2025-05-02 NOTE — PLAN OF CARE
Problem: Chronic Conditions and Co-morbidities  Goal: Patient's chronic conditions and co-morbidity symptoms are monitored and maintained or improved  Outcome: Adequate for Discharge  Flowsheets (Taken 5/1/2025 0830)  Care Plan - Patient's Chronic Conditions and Co-Morbidity Symptoms are Monitored and Maintained or Improved:   Monitor and assess patient's chronic conditions and comorbid symptoms for stability, deterioration, or improvement   Collaborate with multidisciplinary team to address chronic and comorbid conditions and prevent exacerbation or deterioration   Update acute care plan with appropriate goals if chronic or comorbid symptoms are exacerbated and prevent overall improvement and discharge     Problem: Discharge Planning  Goal: Discharge to home or other facility with appropriate resources  Outcome: Adequate for Discharge  Flowsheets (Taken 5/1/2025 0830)  Discharge to home or other facility with appropriate resources:   Identify barriers to discharge with patient and caregiver   Arrange for needed discharge resources and transportation as appropriate     Problem: Skin/Tissue Integrity  Goal: Skin integrity remains intact  Description: 1.  Monitor for areas of redness and/or skin breakdown2.  Assess vascular access sites hourly3.  Every 4-6 hours minimum:  Change oxygen saturation probe site4.  Every 4-6 hours:  If on nasal continuous positive airway pressure, respiratory therapy assess nares and determine need for appliance change or resting period  Outcome: Adequate for Discharge  Flowsheets (Taken 5/1/2025 0830)  Skin Integrity Remains Intact: Monitor for areas of redness and/or skin breakdown     Problem: Safety - Adult  Goal: Free from fall injury  Outcome: Adequate for Discharge  Flowsheets (Taken 5/1/2025 0227 by Gloria Clarke RN)  Free From Fall Injury:   Based on caregiver fall risk screen, instruct family/caregiver to ask for assistance with transferring infant if caregiver

## 2025-05-02 NOTE — DISCHARGE SUMMARY
[facility] Hospitalist [facility]    Discharge Summary    Patient: Schuyler Philippe MRN: [id number]  [facility]: [id number]    YOB: 1945  Age: 79 y.o.  Sex: male    DOA: 4/15/2025 LOS:  LOS: 15 days   Discharge Date:          Discharge Diagnoses:   1.  Acute hypoxic respiratory failure requiring initially BiPAP     2 pleural effusion s/p drainage transudate     3 colon cancer with metastasis to liver     4 permanent atrial fibrillation with rapid ventricular rate     5 anemia of chronic illness    Discharge Condition: Guarded     Discharge To: Hospice Home    Consults:  Palliative care    HPI: per Admitting MD \" HISTORY OF PRESENT ILLNESS:     Schuyler is a 79 y.o.   male with past medical history of hypertension, atrial fibrillation on Eliquis, colon cancer with mets to the liver and bilateral lung status post right lower quadrant colostomy presented as a transfer from [facility] for shortness of breath.  Patient was recently admitted at [facility] with similar complaints and was discharged with a diagnosis of pneumonia.  Pleural effusion was noted on CT during that admission.  Shortness of breath got worse today.     When patient was at [facility] he was found to be in a flutter at a rate of 139 bpm.  A flutter improved after fluid resuscitation.  Patient was also noted to have elevated lactic acid and persistent right-sided pleural effusion.  Patient was given IV fluids.  Patient was trialed on BiPAP, but did not tolerated.  Patient was placed on nasal cannula with improvement of shortness of breath.     When patient presented to [facility], his shortness of breath has improved.  He reports he is not on home oxygen.  Patient also endorses cough and nausea.  He denies fever.  Patient states he is currently being treated for cancer, but cannot state what type of treatment he is receiving.  Patient states he has been very weak for the past 5 weeks at home.  His 
(3, 32). These appear similar compared to recent prior  study.    Base of Neck:  Unremarkable.    Mediastinum: Unremarkable.    Heart: Coronary artery calcifications.    Vasculature: Atherosclerosis. Dilated ascending aorta, measuring up to 4.6 cm,  similar to recent prior. Otherwise, the vasculature is not well evaluated in the  absence of intravenous contrast.    Upper Abdomen: Liver is enlarged and heterogeneous. Nearly the entire right lobe  appears replaced by a large metastasis or conglomerate of metastases, similar to  prior. Partially visualized perihepatic fluid.    Soft tissues: Partially visualized edema right lateral abdominal wall. Mild  bilateral gynecomastia, right greater than left.    Bones: No acute osseous abnormality.    Impression  1. Decreased size of the right pleural effusion status post thoracentesis, with  small residual.  -No pneumothorax.    2. New small left pleural effusion.    3. Patchy consolidation in the right lung, particularly the right lower lobe,  could be related to reexpansion atelectasis from recent thoracentesis versus  pneumonia.    4. Multiple pulmonary metastases.    5. Dilated ascending aorta, recommend attention on follow-up.    6. Partially visualized enlarged liver with metastases and perihepatic fluid.    7. Asymmetric edema right lateral lower chest/upper abdominal wall.    Electronically signed by Amalia Harris        Discharge Medications:    Current Discharge Medication List             Details   atropine 1 % ophthalmic solution Place 2 drops under the tongue every 4 hours as needed for Secretions      sertraline (ZOLOFT) 25 MG tablet Take 1 tablet by mouth at bedtime  Qty: 30 tablet, Refills: 3      naloxone (NARCAN) 0.4 MG/ML injection Infuse 1 mL intravenously as needed (narcotic excess reversal)  Qty: 1 each, Refills: 0      ondansetron (ZOFRAN-ODT) 4 MG disintegrating tablet Take 1 tablet by mouth every 8 hours as needed for Nausea or Vomiting  Qty: 10

## 2025-05-02 NOTE — PROGRESS NOTES
1300 Called tiffani [facility] [phone] on hold ,   1307 spoke to AprilMAGGIE given

## 2025-05-02 NOTE — CARE COORDINATION
made a follow up call to Shaylee liaison with [facility] about [facility] contract. Per Shaylee [facility] have received contract and patient can be transported to facility.     called the [facility] to set up transportation and all information was provided to Man at [phone]. Per Man he will send patient information to get auth for transportation. He also informed  to call him at 12 noon to follow up on transportation status.     called and spoke with hospice department and per Stacy she will assist with getting patient transported to [facility].    Mr. Rose with [facility] is following patient for hospice.    Leslie Allan BSW   Case Management

## 2025-05-02 NOTE — CARE COORDINATION
received a call fro [facility] transportation and patient will be transported at 1330. RN Collessa has been notified. Social notified patient family Siddhartha at [phone] and he is agreeable with discharge plan. Shaylee liaison with [facility] is agreeable with discharge plan and transport time.    Discharge order noted for today. Orders received. No other   needs identified at this time. Case management remains available as needed.    Leslie Allan BSW   Case Management

## 2025-05-02 NOTE — CARE COORDINATION
Milton from [facility] was notified that patient transportation time and he will inform the Hospice team nurses.    Leslie Allan BSW   Case Management

## 2025-05-02 NOTE — PALLIATIVE CARE
Palliative Medicine    Follow up visit to comfort patient. He is currently laying in bed with eyes closed. Alerted to name. Shook head \"no\" when asked if he was in any pain. Offered ice chips and took a few bites of pudding.   Sister at the bedside. RN came in to room and states that transport is scheduled for 1:30 today.       Palliative team remains available to provide support to Mr Philippe and his family during this hospital stay.    CODE STATUS: DNR/DNI, COMFORT MEASURES      Tia Sevilla RN  Palliative Medicine Inpatient RN  Palliative [facility] Line: [phone]

## 2025-05-05 LAB
BACTERIA SPEC CULT: NORMAL
SERVICE CMNT-IMP: NORMAL

## 2025-05-12 LAB
BACTERIA SPEC CULT: NORMAL
SERVICE CMNT-IMP: NORMAL

## 2025-05-19 LAB
BACTERIA SPEC CULT: NORMAL
SERVICE CMNT-IMP: NORMAL